# Patient Record
Sex: MALE | Race: WHITE | NOT HISPANIC OR LATINO | Employment: UNEMPLOYED | URBAN - METROPOLITAN AREA
[De-identification: names, ages, dates, MRNs, and addresses within clinical notes are randomized per-mention and may not be internally consistent; named-entity substitution may affect disease eponyms.]

---

## 2022-01-24 ENCOUNTER — OFFICE VISIT (OUTPATIENT)
Dept: NEUROSURGERY | Facility: CLINIC | Age: 57
End: 2022-01-24
Payer: OTHER MISCELLANEOUS

## 2022-01-24 VITALS — WEIGHT: 171.6 LBS | SYSTOLIC BLOOD PRESSURE: 132 MMHG | DIASTOLIC BLOOD PRESSURE: 84 MMHG

## 2022-01-24 DIAGNOSIS — M48.061 SPINAL STENOSIS, LUMBAR REGION, WITHOUT NEUROGENIC CLAUDICATION: Primary | ICD-10-CM

## 2022-01-24 PROCEDURE — 99204 OFFICE O/P NEW MOD 45 MIN: CPT | Performed by: PHYSICIAN ASSISTANT

## 2022-01-24 RX ORDER — ALBUTEROL SULFATE 0.83 MG/ML
2.5 SOLUTION RESPIRATORY (INHALATION)
Status: ON HOLD | COMMUNITY
Start: 2020-03-10 | End: 2022-02-25

## 2022-01-24 RX ORDER — TERBINAFINE HYDROCHLORIDE 250 MG/1
TABLET ORAL
COMMUNITY
Start: 2021-07-07 | End: 2022-04-08

## 2022-01-24 RX ORDER — SUMATRIPTAN 100 MG/1
TABLET, FILM COATED ORAL
Status: ON HOLD | COMMUNITY
Start: 2020-07-16 | End: 2022-02-25

## 2022-01-24 RX ORDER — ESCITALOPRAM OXALATE 20 MG/1
1 TABLET ORAL DAILY
COMMUNITY
Start: 2020-12-01

## 2022-01-24 RX ORDER — HYDROCODONE BITARTRATE AND ACETAMINOPHEN 5; 325 MG/1; MG/1
1 TABLET ORAL EVERY 6 HOURS PRN
Qty: 20 TABLET | Refills: 0 | Status: ON HOLD | OUTPATIENT
Start: 2022-01-24 | End: 2022-03-02

## 2022-01-24 RX ORDER — RIZATRIPTAN BENZOATE 10 MG/1
10 TABLET, ORALLY DISINTEGRATING ORAL
COMMUNITY
Start: 2021-08-05

## 2022-01-24 RX ORDER — DEXAMETHASONE 4 MG/1
TABLET ORAL
Status: ON HOLD | COMMUNITY
Start: 2020-03-10 | End: 2022-02-25

## 2022-01-24 RX ORDER — BUPRENORPHINE HYDROCHLORIDE AND NALOXONE HYDROCHLORIDE DIHYDRATE 8; 2 MG/1; MG/1
TABLET SUBLINGUAL
Status: ON HOLD | COMMUNITY
Start: 2020-08-25 | End: 2022-03-02

## 2022-01-24 RX ORDER — CICLOPIROX 80 MG/ML
SOLUTION TOPICAL
Status: ON HOLD | COMMUNITY
Start: 2021-04-09 | End: 2022-02-25

## 2022-01-24 RX ORDER — ALPRAZOLAM 1 MG
1 TABLET ORAL 3 TIMES DAILY PRN
COMMUNITY
Start: 2021-09-04

## 2022-01-24 ASSESSMENT — PAIN SCALES - GENERAL: PAINLEVEL: WORST PAIN (10)

## 2022-01-24 NOTE — LETTER
1/24/2022         RE: Waqas Lechuga  [address]  [facility] [id number]        Dear Colleague,    Thank you for referring your patient, Waqas Lechuga, to the [facility] [place]. Please see a copy of my visit note below.    Dr. Demetri Stallings  [facility]  Neurosurgery Clinic Visit      CC: back and leg pain    Primary care Provider: No Ref-Primary, Physician    Referring Provider:  Self/work comp      Reason For Visit:   I was asked to consult on the patient for back and leg pain.      HPI: Waqas Lechuga is a 56 year old male who presents for evaluation of his chief complaint of low back and bilateral leg pain.  Symptoms have been gradually worsening for the last several months, with no event or injury.  He originally had an L5-S1 fusion in 2005.  He never had issues up until a few months ago.  He describes severe, bandlike low back pain, with pain that radiates into the posterior thighs bilaterally.  He is unable to stand for any meaningful length of time due to this.  He has not had recent treatment, but has had extensive conservative treatment in the past.  No bowel or bladder changes, or any problems with balance or coordination.      Past Medical History reviewed with patient during visit.      Past Surgical History reviewed with patient during visit.    No current outpatient medications on file.     No current facility-administered medications for this visit.       Not on File    Social History     Socioeconomic History     Marital status:      Spouse name: Not on file     Number of children: Not on file     Years of education: Not on file     Highest education level: Not on file   Occupational History     Not on file   Tobacco Use     Smoking status: Not on file     Smokeless tobacco: Not on file   Substance and Sexual Activity     Alcohol use: Not on file     Drug use: Not on file     Sexual activity: Not on file   Other Topics Concern      Not on file   Social History Narrative     Not on file     Social Determinants of Health     Financial Resource Strain: Not on file   Food Insecurity: Not on file   Transportation Needs: Not on file   Physical Activity: Not on file   Stress: Not on file   Social Connections: Not on file   Intimate Partner Violence: Not on file   Housing Stability: Not on file       No family history on file.       ROS: 10 point ROS neg other than the symptoms noted above in the HPI.    Vital Signs: There were no vitals taken for this visit.    Examination:  Constitutional:  Alert, well nourished, NAD.  HEENT: Normocephalic, atraumatic.   Pulmonary:  Without shortness of breath, normal effort.   Lymph: no lymphadenopathy to low back or LE.   Integumentary: Skin is free of rashes or lesions.   Cardiovascular:  No pitting edema of BLE.    Psych: Normal affect, no apparent distress    Neurological:  Awake  Alert  Oriented x 3  Speech clear  Cranial nerves II - XII grossly intact  Motor exam   Hip Flexor:                Right: 5/5  Left:  5/5  Hip Adductor:             Right:  5/5  Left:  5/5  Hip Abductor:             Right:  5/5  Left:  5/5  Gastroc Soleus:        Right:  5/5  Left:  5/5  Tib/Ant:                      Right:  5/5  Left:  5/5  EHL:                          Right:  5/5  Left:  5/5       Sensation normal to bilateral upper and lower extremities.    Reflexes are 2+ in the patellar and Achilles. There is no clonus. Downgoing Babinski.      Musculoskeletal:  Gait: Able to stand from a seated position. Normal non-antalgic, non-myelopathic gait.    Lumbar examination reveals no tenderness of the spine or paraspinous muscles.  Hip height is symmetrical. Negative SI joint, sciatic notch or greater trochanteric tenderness to palpation bilaterally.  Straight leg raise is negative bilaterally.      Imaging:   MRI of the lumbar spine was reviewed in the office today.  It shows his prior fusion at L5-S1.  There is adjacent segment  disease at L4-5, which is severe with stenosis centrally.  He has associated facet arthropathy as well.  There is also some left lateral recess narrowing at L3-4, due to broad-based disc bulging.    Assessment/Plan:     Prior L5-S1 fusion in 2005  L4-5 adjacent segment dysfunction  Severe central stenosis L4-5  L4-5 facet arthropathy bilaterally      Waqas Lechuga is a 56 year old male.  I did have a discussion with the patient regarding his long history and symptoms.  He has history of an L5-S1 fusion in 2005, and has now developed adjacent segment disease at L4-5, which is quite severe with severe central stenosis and associated bilateral facet arthropathy.  He also has some narrowing at L3-4, more prominently on the left.  He has a lot of social issues going on right now as well.  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  Ultimately, we discussed that he is likely going to require adjacent segment surgery with Dr. Stallings.  Physical therapy is not likely to be of any benefit for him.  He is not interested in an injection at present.  He would like to address this definitively if possible.  He voiced agreement and understanding.  We will have him follow-up with Dr. Stallings.    He also mentions a history of having 3 pulmonary nodules or tumors, as well as a 60 pound weight loss, not intentional, in the last year.  He was strongly advised to follow-up with his primary care physician to discuss the next steps.  He may need to be referred to oncology or have some additional imaging.          Pasquale Hurst PA-C  [facility] Neurosurgery  [address] [address]    Tel [phone]  Pager [phone]      The use of Dragon/userfoxation services may have been used to construct the content in this note; any grammatical or spelling errors are non-intentional. Please contact the author  "of this note directly if you are in need of any clarification.      Waqas Lechuga is a 56 year old male who presents for:  Chief Complaint   Patient presents with     Neurologic Problem     Spinal cord stenosis        Initial Vitals:  /84 (BP Location: Right arm, Patient Position: Sitting, Cuff Size: Adult Regular)   Wt 171 lb 9.6 oz (77.8 kg)  There is no height or weight on file to calculate BMI.. There is no height or weight on file to calculate BSA. BP completed using cuff size: {BP CUFF SIZE:507::\"regular\"}  Worst Pain (10)    Nursing Comments:       Michelle Castro      Again, thank you for allowing me to participate in the care of your patient.        Sincerely,        Pasquale Hurst PA-C    "

## 2022-01-24 NOTE — PROGRESS NOTES
Dr. Demetri Stallings  [facility]  Neurosurgery Clinic Visit      CC: back and leg pain    Primary care Provider: No Ref-Primary, Physician    Referring Provider:  Self/work comp      Reason For Visit:   I was asked to consult on the patient for back and leg pain.      HPI: Waqas Lechuga is a 56 year old male who presents for evaluation of his chief complaint of low back and bilateral leg pain.  Symptoms have been gradually worsening for the last several months, with no event or injury.  He originally had an L5-S1 fusion in 2005.  He never had issues up until a few months ago.  He describes severe, bandlike low back pain, with pain that radiates into the posterior thighs bilaterally.  He is unable to stand for any meaningful length of time due to this.  He has not had recent treatment, but has had extensive conservative treatment in the past.  No bowel or bladder changes, or any problems with balance or coordination.      Past Medical History reviewed with patient during visit.      Past Surgical History reviewed with patient during visit.    No current outpatient medications on file.     No current facility-administered medications for this visit.       Not on File    Social History     Socioeconomic History     Marital status:      Spouse name: Not on file     Number of children: Not on file     Years of education: Not on file     Highest education level: Not on file   Occupational History     Not on file   Tobacco Use     Smoking status: Not on file     Smokeless tobacco: Not on file   Substance and Sexual Activity     Alcohol use: Not on file     Drug use: Not on file     Sexual activity: Not on file   Other Topics Concern     Not on file   Social History Narrative     Not on file     Social Determinants of Health     Financial Resource Strain: Not on file   Food Insecurity: Not on file   Transportation Needs: Not on file   Physical Activity: Not on file   Stress: Not on file   Social  Connections: Not on file   Intimate Partner Violence: Not on file   Housing Stability: Not on file       No family history on file.       ROS: 10 point ROS neg other than the symptoms noted above in the HPI.    Vital Signs: There were no vitals taken for this visit.    Examination:  Constitutional:  Alert, well nourished, NAD.  HEENT: Normocephalic, atraumatic.   Pulmonary:  Without shortness of breath, normal effort.   Lymph: no lymphadenopathy to low back or LE.   Integumentary: Skin is free of rashes or lesions.   Cardiovascular:  No pitting edema of BLE.    Psych: Normal affect, no apparent distress    Neurological:  Awake  Alert  Oriented x 3  Speech clear  Cranial nerves II - XII grossly intact  Motor exam   Hip Flexor:                Right: 5/5  Left:  5/5  Hip Adductor:             Right:  5/5  Left:  5/5  Hip Abductor:             Right:  5/5  Left:  5/5  Gastroc Soleus:        Right:  5/5  Left:  5/5  Tib/Ant:                      Right:  5/5  Left:  5/5  EHL:                          Right:  5/5  Left:  5/5       Sensation normal to bilateral upper and lower extremities.    Reflexes are 2+ in the patellar and Achilles. There is no clonus. Downgoing Babinski.      Musculoskeletal:  Gait: Able to stand from a seated position. Normal non-antalgic, non-myelopathic gait.    Lumbar examination reveals no tenderness of the spine or paraspinous muscles.  Hip height is symmetrical. Negative SI joint, sciatic notch or greater trochanteric tenderness to palpation bilaterally.  Straight leg raise is negative bilaterally.      Imaging:   MRI of the lumbar spine was reviewed in the office today.  It shows his prior fusion at L5-S1.  There is adjacent segment disease at L4-5, which is severe with stenosis centrally.  He has associated facet arthropathy as well.  There is also some left lateral recess narrowing at L3-4, due to broad-based disc bulging.    Assessment/Plan:     Prior L5-S1 fusion in 2005  L4-5 adjacent  segment dysfunction  Severe central stenosis L4-5  L4-5 facet arthropathy bilaterally      Waqas Lechuga is a 56 year old male.  I did have a discussion with the patient regarding his long history and symptoms.  He has history of an L5-S1 fusion in 2005, and has now developed adjacent segment disease at L4-5, which is quite severe with severe central stenosis and associated bilateral facet arthropathy.  He also has some narrowing at L3-4, more prominently on the left.  He has a lot of social issues going on right now as well.  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  Ultimately, we discussed that he is likely going to require adjacent segment surgery with Dr. Stallings.  Physical therapy is not likely to be of any benefit for him.  He is not interested in an injection at present.  He would like to address this definitively if possible.  He voiced agreement and understanding.  We will have him follow-up with Dr. Stallings.    He also mentions a history of having 3 pulmonary nodules or tumors, as well as a 60 pound weight loss, not intentional, in the last year.  He was strongly advised to follow-up with his primary care physician to discuss the next steps.  He may need to be referred to oncology or have some additional imaging.          Pasquale Hurst PA-C  [facility] [place] [address]    Tel [phone]  Pager [phone]      The use of Dragon/[employer] Materialsation services may have been used to construct the content in this note; any grammatical or spelling errors are non-intentional. Please contact the author of this note directly if you are in need of any clarification.

## 2022-01-24 NOTE — PROGRESS NOTES
Waqas Lechuga is a 56 year old male who presents for:  Chief Complaint   Patient presents with     Neurologic Problem     Spinal cord stenosis        Initial Vitals:  /84 (BP Location: Right arm, Patient Position: Sitting, Cuff Size: Adult Regular)   Wt 171 lb 9.6 oz (77.8 kg)  There is no height or weight on file to calculate BMI.. There is no height or weight on file to calculate BSA. BP completed using cuff size: regular  Worst Pain (10)    Nursing Comments:       Michelle Castro

## 2022-01-27 ENCOUNTER — OFFICE VISIT (OUTPATIENT)
Dept: NEUROSURGERY | Facility: CLINIC | Age: 57
End: 2022-01-27
Payer: OTHER MISCELLANEOUS

## 2022-01-27 VITALS
DIASTOLIC BLOOD PRESSURE: 82 MMHG | SYSTOLIC BLOOD PRESSURE: 128 MMHG | TEMPERATURE: 97.7 F | BODY MASS INDEX: 24.61 KG/M2 | HEIGHT: 70 IN | WEIGHT: 171.9 LBS

## 2022-01-27 DIAGNOSIS — M54.16 SPINAL STENOSIS OF LUMBAR REGION WITH RADICULOPATHY: Primary | ICD-10-CM

## 2022-01-27 DIAGNOSIS — M48.061 SPINAL STENOSIS OF LUMBAR REGION WITH RADICULOPATHY: Primary | ICD-10-CM

## 2022-01-27 DIAGNOSIS — M48.061 SPINAL STENOSIS OF LUMBAR REGION, UNSPECIFIED WHETHER NEUROGENIC CLAUDICATION PRESENT: Primary | ICD-10-CM

## 2022-01-27 PROCEDURE — 99214 OFFICE O/P EST MOD 30 MIN: CPT | Performed by: NEUROLOGICAL SURGERY

## 2022-01-27 ASSESSMENT — MIFFLIN-ST. JEOR: SCORE: 1610.98

## 2022-01-27 ASSESSMENT — PAIN SCALES - GENERAL: PAINLEVEL: EXTREME PAIN (8)

## 2022-01-27 NOTE — PROGRESS NOTES
Reviewed pre- and post-operative instructions as outlined in the After Visit Summary/Patient Instructions with patient.   Surgery folder was given to patient    Patient Education Topic: Procedure with Dr. Stallings     Learner(s): Patient    Knowledge Level: Basic    Readiness to Learn: Ready    Method:  Verbal explanation and Written material     Outcome: Able to verbalize instructions    Barriers to Learning: No barrier    Covid Testing: patient educated they will need to have a test for the novel Coronavirus, COVID-19.The test needs to be completed within 4 days (96) hours of surgery. Order Placed.    Scheduling Number: [phone]    NDI/NAT:  Confirmation of completion within last 6 months      Patient had the opportunity for questions to be answered. Advised Patient to call clinic with any questions/concerns. Gave patient antibacterial soap for pre-surgery skin preparation.     Anabell Wang RN on 1/27/2022 at 3:34 PM

## 2022-01-27 NOTE — PROGRESS NOTES
"Waqas Lechuga is a 56 year old male who presents for:  Chief Complaint   Patient presents with     Neurologic Problem     spinal stenosis        Initial Vitals:  Ht 5' 10\" (1.778 m)   Wt 171 lb 14.4 oz (78 kg)   BMI 24.67 kg/m   Estimated body mass index is 24.67 kg/m  as calculated from the following:    Height as of this encounter: 5' 10\" (1.778 m).    Weight as of this encounter: 171 lb 14.4 oz (78 kg).. Body surface area is 1.96 meters squared. BP completed using cuff size: regular  Data Unavailable    Nursing Comments:     Mi Reyes    "

## 2022-01-27 NOTE — PATIENT INSTRUCTIONS
Patient Instructions    Surgery scheduled at [facility] for  L4-S1 extension PSF with L5-S1 hardware removal, Left L1-2 and L3-4 microdiscectomies  with Dr. Stallings     Pre-Operative    Ordered a CT scan to be completed pre-op. [phone]    Surgical risks: blood clots in the leg or lung, problems urinating, nerve damage, drainage from the incision, infection, stiffness    Pre-operative physical with primary care physician within 30 days of surgical date.     2-4 night hospitalization stay     Smoking Cessation: You are advised to quit smoking immediately through recovery to help with healing and reduce risk of complications. Printout given.     Shower procedure  o Please shower with antimicrobial soap the night before surgery and morning of surgery. Please refer to showering instruction sheet in folder.    Eating/Drinking  o Stop all solid foods 8 hours before surgery.  o Keep drinking clear liquids until 4 hours before surgery  - Clear liquids include water, clear juice, black coffee, or clear tea without milk, Gatorade, clear soda.     Medications  o Hold Aspirin, NSAIDs (Advil/Ibuprofen, Indocin, Naproxen,Nuprin,Relafen/Nabumetone, Diclofenac,Meloxicam, Aleve, Celebrex) x 7 days prior to surgical date  o You can take Tylenol (Acetaminophen) for pain, 1000 mg  - Do not exceed 3,000 mg per day   o  If you are on chronic pain medication (oxycodone, Percocet, hydrocodone, Vicodin, Norco, Dilaudid, morphine, MS Contin, naltrexone, Suboxone, etc) or have a pain contract you need  to contact the pain medication prescriber and/or the prescriber who holds the pain contract with you. A plan needs to be created between you and the provider on how to take your chronic pain medication leading up to surgery and what you will be taking to control pain/who will be prescribing that pain medication during recovery.  o Any other medications prescribed, please discuss with your primary care provider at  your pre-operative physical     COVID Testing  As part of preparation for your upcoming procedure you are required to have a test for the novel Coronavirus, COVID-19.  o Please call the drive-up testing number to schedule your appointment. The test needs to be completed within 4 days (96) hours of surgery.   o Scheduling Number: [phone]    You may NOT receive the COVID-19 vaccine 72 hours before or after surgery.    [email or web address]    Post-Operative    Pain Management    Dealing with pain  o As your body heals, you might feel a stabbing, burning, or aching pain. You may also have some numbness.  o Everyone feels pain differently, we may ask you to rate your pain using a pain scale. This will let us know how much pain you feel.   o Keep in mind that medicine won't take away all of your pain. It helps to try other ways to relax and ease pain.     Things to help with pain  o After surgery, we will give you medicine for your pain. These medications work well, but they can make you drowsy, itchy, or sick to your stomach. If we give you narcotics for pain, try to take the pills with food.   o For mild to moderate pain, you can take medication such as Tylenol. These can be used with narcotics, but make sure that your narcotic does not contain Tylenol.   - Do NOT drive while taking narcotic pain medication  - Do NOT drink alcohol while using any pain medication  o You can utilize ice as needed (no longer than 20 minutes at one time)    Refills: please call the neurosurgery clinic to request a few days before you run out    You may resume taking NSAIDs (ex. Ibuprofen, aleve, naproxen) 6 weeks after surgery as it may cause bleeding and interfere with bone healing     Incision Care    Look at your incision site every day. You  may need a mirror or family member to help you.     Watch for signs of infection  o Redness, swelling, warmth, drainage (Green or yellow drainage (pus) from your incision or increased bloody  drainage), and fever of 101 degrees or higher  o Notify clinic [phone]    Remove dressing as instructed upon discharge    Do not apply lotions or ointments to incision    You may get your incision wet in the shower. Allow water and soap to run over incision, and gently pat dry.     No submerging incision in water such as pools, hot tubs, baths for at least 8 weeks or until incision is healed    Activity Restrictions    For the first 6-8 weeks, no lifting > 10 pounds, no bending, twisting, or overhead reaching.    Take stairs in moderation     Walking: Walking is the best way to start exercise after surgery. Take short frequent walks. You may gradually increase the distance as tolerated. If you feel pain, decrease your activity, but DO NOT stop walking. Walking will help you gain strength and prevent muscle soreness and spasms.     Avoid bed rest and prolonged sitting for longer than 30 minutes (change positions frequently while awake)    No contact sports until after follow up visit    No high impact activities such as; running/jogging, snowmobile or 4 lyles riding or any other recreational vehicles    If a brace is required per Dr. Stallings, Orth[facility] will fit you for the brace in the hospital. Brace type and length of time to wear it will be determined by Dr. Stallings.   Contact clinic right away if you develop:     Infection    New injury    Bladder or bowel changes or loss of control      Signs of blood clot:  o Swelling and/or warmth in one or both legs  o Pain or tenderness in your leg, ankle, foot, or arm   o Red or discolored skin     Go to the Emergency Room     If sudden onset of severe headache, weakness, confusion, change in level of consciousness, pain, or loss of movement.    Chest pain     Trouble breathing     Post-Op Follow Up Appointments    2 week incision check & staple/suture removal with RN    6 week post op with x-ray prior     3 months post op with x-ray prior     6 months post op with x-ray  "prior     1 year post op with x-ray prior    Please call to schedule follow up and xray appointments at [phone]    Resources    If you are currently employed, you will need to be off work for 4-6 weeks for post op recovery and healing.    Please fax any FMLA/short term disability paperwork to [phone] prior to surgery    You may call our clinic when you'd like to return to work and we can provide a work letter    To allow staff adequate time to complete paperwork, please send as soon as possible     Anabell JONES RN  [facility]  [facility] - [address] [address]  Telephone:  [phone]       Fax:  [phone]      Ways to Quit Smoking     It's not too late to quit smoking.   Quitting smoking helps your circulation, your stamina, your skin, and your general health. Your risk for coronary heart disease, a common cause of death and disability, is halved after only a year without smoking. Quitting smoking also reduces the likelihood of your getting respiratory problems and lung cancer.   Studies have shown that your smoke affects others as well as yourself. Children of parents who smoke around the house are more prone to respiratory infections than children from nonsmoking homes.   Smoking is an addictive habit. Most former smokers make several attempts to quit before they are finally successful. So, never say, \"I can't.\" Just keep trying.   Set a quit date.   Set a date for when you will stop smoking. Don't buy cigarettes to carry you beyond your last day. Tell your family and friends you plan to quit, and ask for their support and encouragement. Ask them not to offer you cigarettes.   Throw your cigarettes away.   If you keep cigarettes around, sooner or later you'll break down and smoke one, then another, then another, and so on. Throw them away. Make it less easy to start again.   Try chewing gum is as a " "substitute for cigarettes.   Spend time with nonsmokers rather than with smokers.   Think of yourself and identify yourself as a nonsmoker (for example, in restaurants). Stay away from \"smokers' levy,\" such as bars. Avoid spending time with smokers. You can't tell others not to smoke, but you don't have to sit with them while they do. Old habits die hard and one of your old smoking buddies is sure to offer you a cigarette. Plan on walking away from cigarette smoke. Spend time with nonsmokers and sit in the nonsmoking section of restaurants.   Be prepared for relapse or difficult situations.   Most relapses occur within the first 3 months after quitting. Many people try 5 or more times before they successfully quit. Avoid drinking alcohol, because it lowers your chances of success. Don't be distracted by weight gain, which is usually less than 10 pounds. Learn new ways to improve your mood and overcome depression.   Start an exercise program.   As you become more fit, you will not want the nicotine effects in your body. Regular exercise will also help keep you from gaining weight when you quit smoking.   Keep your hands busy.   You may not know what to do with your hands for a while.  a book or a magazine. Try knitting, needlework, pottery, drawing, making a plastic model, or doing a jigsaw puzzle. Join special interest groups that keep you involved in your hobby.   Take on new activities.   Take on new activities that don't include smoking. Join an exercise group and work out regularly. Sign up for an evening class or a join a study group at your place of [employer]. Go on more outings with your family or friends. Learn ways to relax and manage stress.   Join quit-smoking programs if it helps.   Some people do better in groups, or with a set of instructions to follow. That's fine, too. Remember, the aim is to quit smoking. It doesn't matter how you do it.   Consider using nicotine replacement therapy. "   Nicotine is the drug that is in tobacco. You can use nicotine patches or gum, available without a prescription at your local pharmacy, to quit smoking. It is a two-step process. First you learn to live without smoking, but not without nicotine. Then, as you graduate to patches with less nicotine, or chew less of the nicotine gum, you wean yourself off the nicotine.   Your health care provider can prescribe nicotine substitutes that can almost double your chances of quitting for good. They are:   Zyban (bupropion HCL)   nicotine inhaler   nicotine lozenge   nicotine nasal spray   nicotine patch.   You may prefer to be involved in an organized quit-smoking program while you are using nicotine patches or gum or other medicine to help you quit. None of these treatments is a miracle cure. You still need to learn to live without cigarettes in your daily life.     Developed by Toshia Freeman MD, for [employer].   Published by [employer].   This content is reviewed periodically and is subject to change as new health information becomes available. The information is intended to inform and educate and is not a replacement for medical evaluation, advice, diagnosis or treatment by a healthcare professional.   Adult Health Advisor 2003.2 Index  Adult Health Advisor 2003.2 Credits   Copyright   2003 [employer]. All rights reserved.     More information can also be obtained from the [employer] [place]:  Telephone: 0-604-0-CANCER (1-263.386.8562)   TTY: 1-861.269.5191   (for deaf or hard of hearing callers)

## 2022-01-27 NOTE — LETTER
"    1/27/2022         RE: Waqas Lechuga  [address]  [facility] [place] [address]        Dear Colleague,    Thank you for referring your patient, Waqas Lechuga, to the [facility] [place]. Please see a copy of my visit note below.    Waqas Lechuga is a 56 year old male who presents for:  Chief Complaint   Patient presents with     Neurologic Problem     spinal stenosis        Initial Vitals:  Ht 5' 10\" (1.778 m)   Wt 171 lb 14.4 oz (78 kg)   BMI 24.67 kg/m   Estimated body mass index is 24.67 kg/m  as calculated from the following:    Height as of this encounter: 5' 10\" (1.778 m).    Weight as of this encounter: 171 lb 14.4 oz (78 kg).. Body surface area is 1.96 meters squared. BP completed using cuff size: regular  Data Unavailable    Nursing Comments:     Mi Reyes      Waqas Lechuga is a 56 year old male who presents for evaluation of his chief complaint of low back and bilateral leg pain.  Symptoms have been gradually worsening for the last several months, with no event or injury.  He originally had an L5-S1 fusion in 2005.  He never had issues up until a few months ago.  He describes severe, bandlike low back pain, with pain that radiates into the posterior thighs bilaterally.  He is unable to stand for any meaningful length of time due to this.  He has not had recent treatment, but has had extensive conservative treatment in the past.  No bowel or bladder changes, or any problems with balance or coordination.    Returns for follow up.  Continued severe back and leg pain, worse with standing and walking, and sharp left leg pain radiating to the anterior thigh and shin.  Multiple JESUS and home therapy without improvement.    Past Medical History reviewed with patient during visit.      Past Surgical History reviewed with patient during visit.    Current Outpatient Medications   Medication     albuterol (PROVENTIL) (2.5 MG/3ML) 0.083% neb solution     ALPRAZolam (XANAX) " 1 MG tablet     buprenorphine-naloxone (SUBOXONE) 8-2 MG SUBL sublingual tablet     ciclopirox (PENLAC) 8 % external solution     dexamethasone (DECADRON) 4 MG tablet     escitalopram (LEXAPRO) 20 MG tablet     HYDROcodone-acetaminophen (NORCO) 5-325 MG tablet     rizatriptan (MAXALT-MLT) 10 MG ODT     SUMAtriptan (IMITREX) 100 MG tablet     terbinafine (LAMISIL) 250 MG tablet     tiZANidine (ZANAFLEX) 4 MG tablet     No current facility-administered medications for this visit.       Allergies   Allergen Reactions     Prochlorperazine Other (See Comments)     [facility] Comment: Dystonic rxn; [facility] Reaction: Other  Other reaction(s): *Unknown     Meperidine Rash     Trazodone Other (See Comments) and Unknown     Erection that lasted a long time  [facility] Comment: Patient states he gets an erection. ; [facility] Reaction: Unknown; [facility] Reaction: 17; [facility] Severity: Low; [facility] Noted: 20150121  Erection that lasted a long time  Patient states he gets an erection.          Social History     Socioeconomic History     Marital status:      Spouse name: Not on file     Number of children: Not on file     Years of education: Not on file     Highest education level: Not on file   Occupational History     Not on file   Tobacco Use     Smoking status: Not on file     Smokeless tobacco: Not on file   Substance and Sexual Activity     Alcohol use: Not on file     Drug use: Not on file     Sexual activity: Not on file   Other Topics Concern     Not on file   Social History Narrative     Not on file     Social Determinants of Health     Financial Resource Strain: Not on file   Food Insecurity: Not on file   Transportation Needs: Not on file   Physical Activity: Not on file   Stress: Not on file   Social Connections: Not on file   Intimate Partner Violence: Not on file   Housing Stability: Not on file       No family history on file.       ROS: 10 point ROS neg other than the symptoms noted above in the HPI.    Vital Signs: /82 (BP Location:  "Right arm, Patient Position: Sitting, Cuff Size: Adult Regular)   Temp 97.7  F (36.5  C) (Temporal)   Ht 1.778 m (5' 10\")   Wt 78 kg (171 lb 14.4 oz)   BMI 24.67 kg/m      Examination:  Constitutional:  Alert, well nourished, NAD.  HEENT: Normocephalic, atraumatic.   Pulmonary:  Without shortness of breath, normal effort.   Lymph: no lymphadenopathy to low back or LE.   Integumentary: Skin is free of rashes or lesions.   Cardiovascular:  No pitting edema of BLE.    Psych: Normal affect, no apparent distress    Neurological:  Awake  Alert  Oriented x 3  Speech clear  Cranial nerves II - XII grossly intact  Motor exam   Hip Flexor:                Right: 5/5  Left:  5/5  Hip Adductor:             Right:  5/5  Left:  5/5  Hip Abductor:             Right:  5/5  Left:  5/5  Gastroc Soleus:        Right:  5/5  Left:  5/5  Tib/Ant:                      Right:  5/5  Left:  5/5  EHL:                          Right:  5/5  Left:  5/5       Sensation normal to bilateral upper and lower extremities.    Reflexes are 2+ in the patellar and Achilles. There is no clonus. Downgoing Babinski.      Musculoskeletal:  Gait: Able to stand from a seated position. Normal non-antalgic, non-myelopathic gait.    Lumbar examination reveals no tenderness of the spine or paraspinous muscles.  Hip height is symmetrical. Negative SI joint, sciatic notch or greater trochanteric tenderness to palpation bilaterally.  Straight leg raise is negative bilaterally.      Imaging:   MRI of the lumbar spine was reviewed in the office today.  It shows his prior fusion at L5-S1.  There is adjacent segment disease at L4-5, which is severe with stenosis centrally.  He has associated facet arthropathy as well.  There is also some left lateral recess narrowing at L3-4, due to broad-based disc bulging.    Assessment/Plan:     Prior L5-S1 fusion in 2005  L4-5 adjacent segment dysfunction  Severe central stenosis L4-5  Lumbar disc herniations and " radiculopathy      Waqas Lechuga is a 56 year old male.  I did have a discussion with the patient regarding his long history and symptoms.  He has history of an L5-S1 fusion in 2005, and has now developed adjacent segment disease at L4-5, which is quite severe with severe central stenosis and associated bilateral facet arthropathy.  Also has left L1-2 and L3-4 left lateral recess extruded disc herniations      Again, thank you for allowing me to participate in the care of your patient.        Sincerely,        Demetri Stallings MD

## 2022-01-27 NOTE — PROGRESS NOTES
Waqas Lechuga is a 56 year old male who presents for evaluation of his chief complaint of low back and bilateral leg pain.  Symptoms have been gradually worsening for the last several months, with no event or injury.  He originally had an L5-S1 fusion in 2005.  He never had issues up until a few months ago.  He describes severe, bandlike low back pain, with pain that radiates into the posterior thighs bilaterally.  He is unable to stand for any meaningful length of time due to this.  He has not had recent treatment, but has had extensive conservative treatment in the past.  No bowel or bladder changes, or any problems with balance or coordination.    Returns for follow up.  Continued severe back and leg pain, worse with standing and walking, and sharp left leg pain radiating to the anterior thigh and shin.  Multiple JESUS and home therapy without improvement.    Past Medical History reviewed with patient during visit.      Past Surgical History reviewed with patient during visit.    Current Outpatient Medications   Medication     albuterol (PROVENTIL) (2.5 MG/3ML) 0.083% neb solution     ALPRAZolam (XANAX) 1 MG tablet     buprenorphine-naloxone (SUBOXONE) 8-2 MG SUBL sublingual tablet     ciclopirox (PENLAC) 8 % external solution     dexamethasone (DECADRON) 4 MG tablet     escitalopram (LEXAPRO) 20 MG tablet     HYDROcodone-acetaminophen (NORCO) 5-325 MG tablet     rizatriptan (MAXALT-MLT) 10 MG ODT     SUMAtriptan (IMITREX) 100 MG tablet     terbinafine (LAMISIL) 250 MG tablet     tiZANidine (ZANAFLEX) 4 MG tablet     No current facility-administered medications for this visit.       Allergies   Allergen Reactions     Prochlorperazine Other (See Comments)     [facility] Comment: Dystonic rxn; [facility] Reaction: Other  Other reaction(s): *Unknown     Meperidine Rash     Trazodone Other (See Comments) and Unknown     Erection that lasted a long time  [facility] Comment: Patient states he gets an erection. ; [facility] Reaction: Unknown; [facility]  "Reaction: 17; [facility] Severity: Low; [facility] Noted: 20150121  Erection that lasted a long time  Patient states he gets an erection.          Social History     Socioeconomic History     Marital status:      Spouse name: Not on file     Number of children: Not on file     Years of education: Not on file     Highest education level: Not on file   Occupational History     Not on file   Tobacco Use     Smoking status: Not on file     Smokeless tobacco: Not on file   Substance and Sexual Activity     Alcohol use: Not on file     Drug use: Not on file     Sexual activity: Not on file   Other Topics Concern     Not on file   Social History Narrative     Not on file     Social Determinants of Health     Financial Resource Strain: Not on file   Food Insecurity: Not on file   Transportation Needs: Not on file   Physical Activity: Not on file   Stress: Not on file   Social Connections: Not on file   Intimate Partner Violence: Not on file   Housing Stability: Not on file       No family history on file.       ROS: 10 point ROS neg other than the symptoms noted above in the HPI.    Vital Signs: /82 (BP Location: Right arm, Patient Position: Sitting, Cuff Size: Adult Regular)   Temp 97.7  F (36.5  C) (Temporal)   Ht 1.778 m (5' 10\")   Wt 78 kg (171 lb 14.4 oz)   BMI 24.67 kg/m      Examination:  Constitutional:  Alert, well nourished, NAD.  HEENT: Normocephalic, atraumatic.   Pulmonary:  Without shortness of breath, normal effort.   Lymph: no lymphadenopathy to low back or LE.   Integumentary: Skin is free of rashes or lesions.   Cardiovascular:  No pitting edema of BLE.    Psych: Normal affect, no apparent distress    Neurological:  Awake  Alert  Oriented x 3  Speech clear  Cranial nerves II - XII grossly intact  Motor exam   Hip Flexor:                Right: 5/5  Left:  5/5  Hip Adductor:             Right:  5/5  Left:  5/5  Hip Abductor:             Right:  5/5  Left:  5/5  Gastroc Soleus:        Right:  5/5  Left:  " 5/5  Tib/Ant:                      Right:  5/5  Left:  5/5  EHL:                          Right:  5/5  Left:  5/5       Sensation normal to bilateral upper and lower extremities.    Reflexes are 2+ in the patellar and Achilles. There is no clonus. Downgoing Babinski.      Musculoskeletal:  Gait: Able to stand from a seated position. Normal non-antalgic, non-myelopathic gait.    Lumbar examination reveals no tenderness of the spine or paraspinous muscles.  Hip height is symmetrical. Negative SI joint, sciatic notch or greater trochanteric tenderness to palpation bilaterally.  Straight leg raise is negative bilaterally.      Imaging:   MRI of the lumbar spine was reviewed in the office today.  It shows his prior fusion at L5-S1.  There is adjacent segment disease at L4-5, which is severe with stenosis centrally.  He has associated facet arthropathy as well.  There is also some left lateral recess narrowing at L3-4, due to broad-based disc bulging.    Assessment/Plan:     Prior L5-S1 fusion in 2005  L4-5 adjacent segment dysfunction  Severe central stenosis L4-5  Lumbar disc herniations and radiculopathy      Waqas Lechuga is a 56 year old male.  I did have a discussion with the patient regarding his long history and symptoms.  He has history of an L5-S1 fusion in 2005, and has now developed adjacent segment disease at L4-5, which is quite severe with severe central stenosis and associated bilateral facet arthropathy.  Also has left L1-2 and L3-4 left lateral recess extruded disc herniations

## 2022-02-07 DIAGNOSIS — Z11.59 ENCOUNTER FOR SCREENING FOR OTHER VIRAL DISEASES: Primary | ICD-10-CM

## 2022-02-12 ENCOUNTER — HEALTH MAINTENANCE LETTER (OUTPATIENT)
Age: 57
End: 2022-02-12

## 2022-02-18 ENCOUNTER — TELEPHONE (OUTPATIENT)
Dept: NEUROLOGY | Facility: CLINIC | Age: 57
End: 2022-02-18

## 2022-02-18 NOTE — TELEPHONE ENCOUNTER
The patient called needing a Covid test.   He is hoping at having the Covid test on Monday before his CT. Order is in the system .Given the number to Central Scheduling.

## 2022-02-21 ENCOUNTER — HOSPITAL ENCOUNTER (OUTPATIENT)
Dept: CT IMAGING | Facility: CLINIC | Age: 57
Discharge: HOME | End: 2022-02-21
Attending: NEUROLOGICAL SURGERY | Admitting: NEUROLOGICAL SURGERY
Payer: OTHER MISCELLANEOUS

## 2022-02-21 ENCOUNTER — LAB (OUTPATIENT)
Dept: LAB | Facility: CLINIC | Age: 57
End: 2022-02-21
Payer: OTHER MISCELLANEOUS

## 2022-02-21 DIAGNOSIS — Z11.59 ENCOUNTER FOR SCREENING FOR OTHER VIRAL DISEASES: ICD-10-CM

## 2022-02-21 DIAGNOSIS — M48.061 SPINAL STENOSIS OF LUMBAR REGION WITH RADICULOPATHY: ICD-10-CM

## 2022-02-21 DIAGNOSIS — M54.16 SPINAL STENOSIS OF LUMBAR REGION WITH RADICULOPATHY: ICD-10-CM

## 2022-02-21 PROCEDURE — U0003 INFECTIOUS AGENT DETECTION BY NUCLEIC ACID (DNA OR RNA); SEVERE ACUTE RESPIRATORY SYNDROME CORONAVIRUS 2 (SARS-COV-2) (CORONAVIRUS DISEASE [COVID-19]), AMPLIFIED PROBE TECHNIQUE, MAKING USE OF HIGH THROUGHPUT TECHNOLOGIES AS DESCRIBED BY CMS-2020-01-R: HCPCS

## 2022-02-21 PROCEDURE — U0005 INFEC AGEN DETEC AMPLI PROBE: HCPCS

## 2022-02-21 PROCEDURE — 72131 CT LUMBAR SPINE W/O DYE: CPT

## 2022-02-22 DIAGNOSIS — M48.061 SPINAL STENOSIS OF LUMBAR REGION WITH RADICULOPATHY: Primary | ICD-10-CM

## 2022-02-22 DIAGNOSIS — M54.16 SPINAL STENOSIS OF LUMBAR REGION WITH RADICULOPATHY: Primary | ICD-10-CM

## 2022-02-22 LAB — SARS-COV-2 RNA RESP QL NAA+PROBE: NEGATIVE

## 2022-02-25 ENCOUNTER — HOSPITAL ENCOUNTER (INPATIENT)
Facility: CLINIC | Age: 57
LOS: 5 days | Discharge: HOME | DRG: 454 | End: 2022-03-02
Attending: NEUROLOGICAL SURGERY | Admitting: NEUROLOGICAL SURGERY
Payer: OTHER MISCELLANEOUS

## 2022-02-25 ENCOUNTER — TELEPHONE (OUTPATIENT)
Dept: NEUROSURGERY | Facility: CLINIC | Age: 57
End: 2022-02-25

## 2022-02-25 ENCOUNTER — APPOINTMENT (OUTPATIENT)
Dept: GENERAL RADIOLOGY | Facility: CLINIC | Age: 57
DRG: 454 | End: 2022-02-25
Attending: NEUROLOGICAL SURGERY
Payer: OTHER MISCELLANEOUS

## 2022-02-25 ENCOUNTER — ANESTHESIA EVENT (OUTPATIENT)
Dept: SURGERY | Facility: CLINIC | Age: 57
DRG: 454 | End: 2022-02-25
Payer: OTHER MISCELLANEOUS

## 2022-02-25 ENCOUNTER — DOCUMENTATION ONLY (OUTPATIENT)
Dept: NEUROSURGERY | Facility: CLINIC | Age: 57
End: 2022-02-25

## 2022-02-25 ENCOUNTER — ANESTHESIA (OUTPATIENT)
Dept: SURGERY | Facility: CLINIC | Age: 57
DRG: 454 | End: 2022-02-25
Payer: OTHER MISCELLANEOUS

## 2022-02-25 DIAGNOSIS — Z98.1 S/P LUMBAR SPINAL FUSION: Primary | ICD-10-CM

## 2022-02-25 LAB
ABO/RH(D): NORMAL
ANTIBODY SCREEN: NEGATIVE
SPECIMEN EXPIRATION DATE: NORMAL

## 2022-02-25 PROCEDURE — 61783 SCAN PROC SPINAL: CPT | Mod: AS | Performed by: NURSE PRACTITIONER

## 2022-02-25 PROCEDURE — 258N000003 HC RX IP 258 OP 636: Performed by: ANESTHESIOLOGY

## 2022-02-25 PROCEDURE — 250N000011 HC RX IP 250 OP 636: Performed by: NURSE ANESTHETIST, CERTIFIED REGISTERED

## 2022-02-25 PROCEDURE — 63030 LAMOT DCMPRN NRV RT 1 LMBR: CPT | Mod: AS | Performed by: NURSE PRACTITIONER

## 2022-02-25 PROCEDURE — 01NB0ZZ RELEASE LUMBAR NERVE, OPEN APPROACH: ICD-10-PCS | Performed by: NEUROLOGICAL SURGERY

## 2022-02-25 PROCEDURE — 20936 SP BONE AGRFT LOCAL ADD-ON: CPT | Performed by: NEUROLOGICAL SURGERY

## 2022-02-25 PROCEDURE — 120N000001 HC R&B MED SURG/OB

## 2022-02-25 PROCEDURE — 63030 LAMOT DCMPRN NRV RT 1 LMBR: CPT | Mod: 59 | Performed by: NEUROLOGICAL SURGERY

## 2022-02-25 PROCEDURE — 0ST20ZZ RESECTION OF LUMBAR VERTEBRAL DISC, OPEN APPROACH: ICD-10-PCS | Performed by: NEUROLOGICAL SURGERY

## 2022-02-25 PROCEDURE — 250N000009 HC RX 250: Performed by: ANESTHESIOLOGY

## 2022-02-25 PROCEDURE — 86850 RBC ANTIBODY SCREEN: CPT | Performed by: ANESTHESIOLOGY

## 2022-02-25 PROCEDURE — 0SG007J FUSION OF LUMBAR VERTEBRAL JOINT WITH AUTOLOGOUS TISSUE SUBSTITUTE, POSTERIOR APPROACH, ANTERIOR COLUMN, OPEN APPROACH: ICD-10-PCS | Performed by: NEUROLOGICAL SURGERY

## 2022-02-25 PROCEDURE — 0SG0071 FUSION OF LUMBAR VERTEBRAL JOINT WITH AUTOLOGOUS TISSUE SUBSTITUTE, POSTERIOR APPROACH, POSTERIOR COLUMN, OPEN APPROACH: ICD-10-PCS | Performed by: NEUROLOGICAL SURGERY

## 2022-02-25 PROCEDURE — 250N000011 HC RX IP 250 OP 636: Performed by: ANESTHESIOLOGY

## 2022-02-25 PROCEDURE — 63035 LAMOT DCMPRN NRV RT EA ADDL: CPT | Mod: AS | Performed by: NURSE PRACTITIONER

## 2022-02-25 PROCEDURE — 63052 LAM FACETC/FRMT ARTHRD LUM 1: CPT | Mod: AS | Performed by: NURSE PRACTITIONER

## 2022-02-25 PROCEDURE — 22853 INSJ BIOMECHANICAL DEVICE: CPT | Performed by: NEUROLOGICAL SURGERY

## 2022-02-25 PROCEDURE — 250N000009 HC RX 250: Performed by: NURSE ANESTHETIST, CERTIFIED REGISTERED

## 2022-02-25 PROCEDURE — 86901 BLOOD TYPING SEROLOGIC RH(D): CPT | Performed by: ANESTHESIOLOGY

## 2022-02-25 PROCEDURE — 22633 ARTHRD CMBN 1NTRSPC LUMBAR: CPT | Mod: AS | Performed by: NURSE PRACTITIONER

## 2022-02-25 PROCEDURE — P9041 ALBUMIN (HUMAN),5%, 50ML: HCPCS | Performed by: NURSE ANESTHETIST, CERTIFIED REGISTERED

## 2022-02-25 PROCEDURE — 710N000009 HC RECOVERY PHASE 1, LEVEL 1, PER MIN: Performed by: NEUROLOGICAL SURGERY

## 2022-02-25 PROCEDURE — 370N000017 HC ANESTHESIA TECHNICAL FEE, PER MIN: Performed by: NEUROLOGICAL SURGERY

## 2022-02-25 PROCEDURE — 22853 INSJ BIOMECHANICAL DEVICE: CPT | Mod: AS | Performed by: NURSE PRACTITIONER

## 2022-02-25 PROCEDURE — 999N000179 XR SURGERY CARM FLUORO LESS THAN 5 MIN W STILLS

## 2022-02-25 PROCEDURE — 250N000009 HC RX 250: Performed by: NEUROLOGICAL SURGERY

## 2022-02-25 PROCEDURE — 258N000003 HC RX IP 258 OP 636: Performed by: NURSE PRACTITIONER

## 2022-02-25 PROCEDURE — 250N000011 HC RX IP 250 OP 636: Performed by: NURSE PRACTITIONER

## 2022-02-25 PROCEDURE — 272N000002 HC OR SUPPLY OTHER OPNP: Performed by: NEUROLOGICAL SURGERY

## 2022-02-25 PROCEDURE — 22840 INSERT SPINE FIXATION DEVICE: CPT | Mod: AS | Performed by: NURSE PRACTITIONER

## 2022-02-25 PROCEDURE — 8E0WXBG COMPUTER ASSISTED PROCEDURE OF TRUNK REGION, WITH COMPUTERIZED TOMOGRAPHY: ICD-10-PCS | Performed by: NEUROLOGICAL SURGERY

## 2022-02-25 PROCEDURE — 63052 LAM FACETC/FRMT ARTHRD LUM 1: CPT | Performed by: NEUROLOGICAL SURGERY

## 2022-02-25 PROCEDURE — 22840 INSERT SPINE FIXATION DEVICE: CPT | Performed by: NEUROLOGICAL SURGERY

## 2022-02-25 PROCEDURE — 36415 COLL VENOUS BLD VENIPUNCTURE: CPT | Performed by: ANESTHESIOLOGY

## 2022-02-25 PROCEDURE — 61783 SCAN PROC SPINAL: CPT | Mod: 59 | Performed by: NEUROLOGICAL SURGERY

## 2022-02-25 PROCEDURE — 278N000051 HC OR IMPLANT GENERAL: Performed by: NEUROLOGICAL SURGERY

## 2022-02-25 PROCEDURE — C1762 CONN TISS, HUMAN(INC FASCIA): HCPCS | Performed by: NEUROLOGICAL SURGERY

## 2022-02-25 PROCEDURE — 22633 ARTHRD CMBN 1NTRSPC LUMBAR: CPT | Performed by: NEUROLOGICAL SURGERY

## 2022-02-25 PROCEDURE — 8E0WXBF COMPUTER ASSISTED PROCEDURE OF TRUNK REGION, WITH FLUOROSCOPY: ICD-10-PCS | Performed by: NEUROLOGICAL SURGERY

## 2022-02-25 PROCEDURE — 20930 SP BONE ALGRFT MORSEL ADD-ON: CPT | Performed by: NEUROLOGICAL SURGERY

## 2022-02-25 PROCEDURE — 8E0W0CZ ROBOTIC ASSISTED PROCEDURE OF TRUNK REGION, OPEN APPROACH: ICD-10-PCS | Performed by: NEUROLOGICAL SURGERY

## 2022-02-25 PROCEDURE — 272N000001 HC OR GENERAL SUPPLY STERILE: Performed by: NEUROLOGICAL SURGERY

## 2022-02-25 PROCEDURE — C1713 ANCHOR/SCREW BN/BN,TIS/BN: HCPCS | Performed by: NEUROLOGICAL SURGERY

## 2022-02-25 PROCEDURE — 250N000011 HC RX IP 250 OP 636: Performed by: NEUROLOGICAL SURGERY

## 2022-02-25 PROCEDURE — 63035 LAMOT DCMPRN NRV RT EA ADDL: CPT | Mod: LT | Performed by: NEUROLOGICAL SURGERY

## 2022-02-25 PROCEDURE — 0SB20ZZ EXCISION OF LUMBAR VERTEBRAL DISC, OPEN APPROACH: ICD-10-PCS | Performed by: NEUROLOGICAL SURGERY

## 2022-02-25 PROCEDURE — 0QP004Z REMOVAL OF INTERNAL FIXATION DEVICE FROM LUMBAR VERTEBRA, OPEN APPROACH: ICD-10-PCS | Performed by: NEUROLOGICAL SURGERY

## 2022-02-25 PROCEDURE — 360N000085 HC SURGERY LEVEL 5 W/ FLUORO, PER MIN: Performed by: NEUROLOGICAL SURGERY

## 2022-02-25 PROCEDURE — 999N000141 HC STATISTIC PRE-PROCEDURE NURSING ASSESSMENT: Performed by: NEUROLOGICAL SURGERY

## 2022-02-25 PROCEDURE — 250N000025 HC SEVOFLURANE, PER MIN: Performed by: NEUROLOGICAL SURGERY

## 2022-02-25 PROCEDURE — 250N000013 HC RX MED GY IP 250 OP 250 PS 637: Performed by: NURSE PRACTITIONER

## 2022-02-25 DEVICE — IMP SPI INTERBODY MEDT CATALYFT PL LONG 7MM 6068076: Type: IMPLANTABLE DEVICE | Site: SPINE LUMBAR | Status: FUNCTIONAL

## 2022-02-25 DEVICE — IMP ROD MEDT SOLERA CVD 5.5X30MM TI 1553201030: Type: IMPLANTABLE DEVICE | Site: SPINE LUMBAR | Status: FUNCTIONAL

## 2022-02-25 DEVICE — GRAFT BONE FIBERS DBF 9ML INJ T50309 PRELOADED: Type: IMPLANTABLE DEVICE | Site: SPINE LUMBAR | Status: FUNCTIONAL

## 2022-02-25 DEVICE — IMPLANTABLE DEVICE: Type: IMPLANTABLE DEVICE | Site: SPINE LUMBAR | Status: FUNCTIONAL

## 2022-02-25 RX ORDER — HYDROXYZINE HYDROCHLORIDE 25 MG/1
25 TABLET, FILM COATED ORAL EVERY 6 HOURS PRN
Status: DISCONTINUED | OUTPATIENT
Start: 2022-02-25 | End: 2022-02-26

## 2022-02-25 RX ORDER — POLYETHYLENE GLYCOL 3350 17 G/17G
17 POWDER, FOR SOLUTION ORAL DAILY
Status: DISCONTINUED | OUTPATIENT
Start: 2022-02-26 | End: 2022-03-02 | Stop reason: HOSPADM

## 2022-02-25 RX ORDER — AMOXICILLIN 250 MG
1 CAPSULE ORAL 2 TIMES DAILY
Status: DISCONTINUED | OUTPATIENT
Start: 2022-02-25 | End: 2022-03-02 | Stop reason: HOSPADM

## 2022-02-25 RX ORDER — ACETAMINOPHEN 325 MG/1
975 TABLET ORAL EVERY 8 HOURS
Status: COMPLETED | OUTPATIENT
Start: 2022-02-25 | End: 2022-02-28

## 2022-02-25 RX ORDER — HYDROMORPHONE HCL IN WATER/PF 6 MG/30 ML
0.4 PATIENT CONTROLLED ANALGESIA SYRINGE INTRAVENOUS
Status: DISCONTINUED | OUTPATIENT
Start: 2022-02-25 | End: 2022-03-02 | Stop reason: HOSPADM

## 2022-02-25 RX ORDER — PROPOFOL 10 MG/ML
INJECTION, EMULSION INTRAVENOUS PRN
Status: DISCONTINUED | OUTPATIENT
Start: 2022-02-25 | End: 2022-02-25

## 2022-02-25 RX ORDER — BISACODYL 10 MG
10 SUPPOSITORY, RECTAL RECTAL DAILY PRN
Status: DISCONTINUED | OUTPATIENT
Start: 2022-02-25 | End: 2022-03-02 | Stop reason: HOSPADM

## 2022-02-25 RX ORDER — SODIUM CHLORIDE, SODIUM LACTATE, POTASSIUM CHLORIDE, CALCIUM CHLORIDE 600; 310; 30; 20 MG/100ML; MG/100ML; MG/100ML; MG/100ML
INJECTION, SOLUTION INTRAVENOUS CONTINUOUS
Status: DISCONTINUED | OUTPATIENT
Start: 2022-02-25 | End: 2022-02-25 | Stop reason: HOSPADM

## 2022-02-25 RX ORDER — FENTANYL CITRATE 50 UG/ML
50 INJECTION, SOLUTION INTRAMUSCULAR; INTRAVENOUS EVERY 5 MIN PRN
Status: DISCONTINUED | OUTPATIENT
Start: 2022-02-25 | End: 2022-02-25 | Stop reason: HOSPADM

## 2022-02-25 RX ORDER — PROPOFOL 10 MG/ML
INJECTION, EMULSION INTRAVENOUS CONTINUOUS PRN
Status: DISCONTINUED | OUTPATIENT
Start: 2022-02-25 | End: 2022-02-25

## 2022-02-25 RX ORDER — LABETALOL HYDROCHLORIDE 5 MG/ML
10 INJECTION, SOLUTION INTRAVENOUS
Status: DISCONTINUED | OUTPATIENT
Start: 2022-02-25 | End: 2022-02-25 | Stop reason: HOSPADM

## 2022-02-25 RX ORDER — HYDROMORPHONE HCL IN WATER/PF 6 MG/30 ML
0.2 PATIENT CONTROLLED ANALGESIA SYRINGE INTRAVENOUS
Status: DISCONTINUED | OUTPATIENT
Start: 2022-02-25 | End: 2022-03-02 | Stop reason: HOSPADM

## 2022-02-25 RX ORDER — BUPIVACAINE HYDROCHLORIDE AND EPINEPHRINE 5; 5 MG/ML; UG/ML
INJECTION, SOLUTION PERINEURAL PRN
Status: DISCONTINUED | OUTPATIENT
Start: 2022-02-25 | End: 2022-02-25 | Stop reason: HOSPADM

## 2022-02-25 RX ORDER — ONDANSETRON 2 MG/ML
4 INJECTION INTRAMUSCULAR; INTRAVENOUS EVERY 30 MIN PRN
Status: DISCONTINUED | OUTPATIENT
Start: 2022-02-25 | End: 2022-02-25 | Stop reason: HOSPADM

## 2022-02-25 RX ORDER — OXYCODONE HYDROCHLORIDE 5 MG/1
10 TABLET ORAL EVERY 4 HOURS PRN
Status: DISCONTINUED | OUTPATIENT
Start: 2022-02-25 | End: 2022-03-01

## 2022-02-25 RX ORDER — ACETAMINOPHEN 325 MG/1
650 TABLET ORAL EVERY 4 HOURS PRN
Status: DISCONTINUED | OUTPATIENT
Start: 2022-02-28 | End: 2022-03-02 | Stop reason: HOSPADM

## 2022-02-25 RX ORDER — CEFAZOLIN SODIUM 1 G/3ML
1 INJECTION, POWDER, FOR SOLUTION INTRAMUSCULAR; INTRAVENOUS EVERY 8 HOURS
Status: DISCONTINUED | OUTPATIENT
Start: 2022-02-25 | End: 2022-03-02 | Stop reason: HOSPADM

## 2022-02-25 RX ORDER — LIDOCAINE 40 MG/G
CREAM TOPICAL
Status: DISCONTINUED | OUTPATIENT
Start: 2022-02-25 | End: 2022-03-02 | Stop reason: HOSPADM

## 2022-02-25 RX ORDER — CEFAZOLIN SODIUM/WATER 2 G/20 ML
2 SYRINGE (ML) INTRAVENOUS SEE ADMIN INSTRUCTIONS
Status: DISCONTINUED | OUTPATIENT
Start: 2022-02-25 | End: 2022-02-25 | Stop reason: HOSPADM

## 2022-02-25 RX ORDER — NEOSTIGMINE METHYLSULFATE 1 MG/ML
VIAL (ML) INJECTION PRN
Status: DISCONTINUED | OUTPATIENT
Start: 2022-02-25 | End: 2022-02-25

## 2022-02-25 RX ORDER — LIDOCAINE HYDROCHLORIDE 20 MG/ML
INJECTION, SOLUTION INFILTRATION; PERINEURAL PRN
Status: DISCONTINUED | OUTPATIENT
Start: 2022-02-25 | End: 2022-02-25

## 2022-02-25 RX ORDER — SODIUM CHLORIDE 9 MG/ML
INJECTION, SOLUTION INTRAVENOUS CONTINUOUS
Status: DISCONTINUED | OUTPATIENT
Start: 2022-02-25 | End: 2022-03-02 | Stop reason: HOSPADM

## 2022-02-25 RX ORDER — ONDANSETRON 4 MG/1
4 TABLET, ORALLY DISINTEGRATING ORAL EVERY 6 HOURS PRN
Status: DISCONTINUED | OUTPATIENT
Start: 2022-02-25 | End: 2022-03-02 | Stop reason: HOSPADM

## 2022-02-25 RX ORDER — ONDANSETRON 2 MG/ML
INJECTION INTRAMUSCULAR; INTRAVENOUS PRN
Status: DISCONTINUED | OUTPATIENT
Start: 2022-02-25 | End: 2022-02-25

## 2022-02-25 RX ORDER — EPHEDRINE SULFATE 50 MG/ML
INJECTION, SOLUTION INTRAMUSCULAR; INTRAVENOUS; SUBCUTANEOUS PRN
Status: DISCONTINUED | OUTPATIENT
Start: 2022-02-25 | End: 2022-02-25

## 2022-02-25 RX ORDER — GABAPENTIN 300 MG/1
300 CAPSULE ORAL
Status: COMPLETED | OUTPATIENT
Start: 2022-02-25 | End: 2022-02-25

## 2022-02-25 RX ORDER — HYDRALAZINE HYDROCHLORIDE 20 MG/ML
2.5-5 INJECTION INTRAMUSCULAR; INTRAVENOUS EVERY 10 MIN PRN
Status: DISCONTINUED | OUTPATIENT
Start: 2022-02-25 | End: 2022-02-25 | Stop reason: HOSPADM

## 2022-02-25 RX ORDER — CEFAZOLIN SODIUM/WATER 2 G/20 ML
2 SYRINGE (ML) INTRAVENOUS
Status: COMPLETED | OUTPATIENT
Start: 2022-02-25 | End: 2022-02-25

## 2022-02-25 RX ORDER — ALBUMIN, HUMAN INJ 5% 5 %
SOLUTION INTRAVENOUS CONTINUOUS PRN
Status: DISCONTINUED | OUTPATIENT
Start: 2022-02-25 | End: 2022-02-25

## 2022-02-25 RX ORDER — OXYCODONE HYDROCHLORIDE 5 MG/1
5 TABLET ORAL EVERY 4 HOURS PRN
Status: DISCONTINUED | OUTPATIENT
Start: 2022-02-25 | End: 2022-03-01

## 2022-02-25 RX ORDER — OXYCODONE HYDROCHLORIDE 5 MG/1
5 TABLET ORAL EVERY 4 HOURS PRN
Status: DISCONTINUED | OUTPATIENT
Start: 2022-02-25 | End: 2022-02-25 | Stop reason: HOSPADM

## 2022-02-25 RX ORDER — ONDANSETRON 4 MG/1
4 TABLET, ORALLY DISINTEGRATING ORAL EVERY 30 MIN PRN
Status: DISCONTINUED | OUTPATIENT
Start: 2022-02-25 | End: 2022-02-25 | Stop reason: HOSPADM

## 2022-02-25 RX ORDER — ONDANSETRON 2 MG/ML
4 INJECTION INTRAMUSCULAR; INTRAVENOUS EVERY 6 HOURS PRN
Status: DISCONTINUED | OUTPATIENT
Start: 2022-02-25 | End: 2022-03-02 | Stop reason: HOSPADM

## 2022-02-25 RX ORDER — CALCIUM CARBONATE 500 MG/1
500 TABLET, CHEWABLE ORAL 4 TIMES DAILY PRN
Status: DISCONTINUED | OUTPATIENT
Start: 2022-02-25 | End: 2022-03-02 | Stop reason: HOSPADM

## 2022-02-25 RX ORDER — KETOROLAC TROMETHAMINE 5 MG/ML
1 SOLUTION OPHTHALMIC 4 TIMES DAILY
Status: DISCONTINUED | OUTPATIENT
Start: 2022-02-25 | End: 2022-03-02 | Stop reason: HOSPADM

## 2022-02-25 RX ORDER — ESCITALOPRAM OXALATE 10 MG/1
20 TABLET ORAL DAILY
Status: DISCONTINUED | OUTPATIENT
Start: 2022-02-26 | End: 2022-03-02 | Stop reason: HOSPADM

## 2022-02-25 RX ORDER — GLYCOPYRROLATE 0.2 MG/ML
INJECTION, SOLUTION INTRAMUSCULAR; INTRAVENOUS PRN
Status: DISCONTINUED | OUTPATIENT
Start: 2022-02-25 | End: 2022-02-25

## 2022-02-25 RX ORDER — KETAMINE HYDROCHLORIDE 10 MG/ML
INJECTION INTRAMUSCULAR; INTRAVENOUS PRN
Status: DISCONTINUED | OUTPATIENT
Start: 2022-02-25 | End: 2022-02-25

## 2022-02-25 RX ORDER — METHOCARBAMOL 750 MG/1
750 TABLET, FILM COATED ORAL EVERY 6 HOURS PRN
Status: DISCONTINUED | OUTPATIENT
Start: 2022-02-25 | End: 2022-02-26

## 2022-02-25 RX ORDER — HYDROMORPHONE HCL IN WATER/PF 6 MG/30 ML
0.4 PATIENT CONTROLLED ANALGESIA SYRINGE INTRAVENOUS EVERY 5 MIN PRN
Status: DISCONTINUED | OUTPATIENT
Start: 2022-02-25 | End: 2022-02-25 | Stop reason: HOSPADM

## 2022-02-25 RX ORDER — FENTANYL CITRATE 50 UG/ML
INJECTION, SOLUTION INTRAMUSCULAR; INTRAVENOUS PRN
Status: DISCONTINUED | OUTPATIENT
Start: 2022-02-25 | End: 2022-02-25

## 2022-02-25 RX ADMIN — PHENYLEPHRINE HYDROCHLORIDE 100 MCG: 10 INJECTION INTRAVENOUS at 10:13

## 2022-02-25 RX ADMIN — OXYCODONE HYDROCHLORIDE 10 MG: 5 TABLET ORAL at 22:57

## 2022-02-25 RX ADMIN — GABAPENTIN 300 MG: 300 CAPSULE ORAL at 07:02

## 2022-02-25 RX ADMIN — ROCURONIUM BROMIDE 10 MG: 50 INJECTION, SOLUTION INTRAVENOUS at 11:30

## 2022-02-25 RX ADMIN — HYDROMORPHONE HYDROCHLORIDE 0.4 MG: 0.2 INJECTION, SOLUTION INTRAMUSCULAR; INTRAVENOUS; SUBCUTANEOUS at 15:20

## 2022-02-25 RX ADMIN — Medication 10 MG: at 07:51

## 2022-02-25 RX ADMIN — Medication 10 MG: at 12:35

## 2022-02-25 RX ADMIN — ROCURONIUM BROMIDE 10 MG: 50 INJECTION, SOLUTION INTRAVENOUS at 10:47

## 2022-02-25 RX ADMIN — SODIUM CHLORIDE: 9 INJECTION, SOLUTION INTRAVENOUS at 17:54

## 2022-02-25 RX ADMIN — HYDROMORPHONE HYDROCHLORIDE 0.5 MG: 1 INJECTION, SOLUTION INTRAMUSCULAR; INTRAVENOUS; SUBCUTANEOUS at 13:54

## 2022-02-25 RX ADMIN — ROCURONIUM BROMIDE 20 MG: 50 INJECTION, SOLUTION INTRAVENOUS at 08:32

## 2022-02-25 RX ADMIN — PROPOFOL 200 MG: 10 INJECTION, EMULSION INTRAVENOUS at 07:48

## 2022-02-25 RX ADMIN — PHENYLEPHRINE HYDROCHLORIDE 100 MCG: 10 INJECTION INTRAVENOUS at 08:09

## 2022-02-25 RX ADMIN — FENTANYL CITRATE 50 MCG: 50 INJECTION, SOLUTION INTRAMUSCULAR; INTRAVENOUS at 14:40

## 2022-02-25 RX ADMIN — PROPOFOL 25 MCG/KG/MIN: 10 INJECTION, EMULSION INTRAVENOUS at 08:15

## 2022-02-25 RX ADMIN — KETOROLAC TROMETHAMINE 1 DROP: 5 SOLUTION OPHTHALMIC at 15:32

## 2022-02-25 RX ADMIN — SENNOSIDES AND DOCUSATE SODIUM 1 TABLET: 50; 8.6 TABLET ORAL at 20:15

## 2022-02-25 RX ADMIN — MIDAZOLAM 2 MG: 1 INJECTION INTRAMUSCULAR; INTRAVENOUS at 07:43

## 2022-02-25 RX ADMIN — HYDROMORPHONE HYDROCHLORIDE 0.4 MG: 0.2 INJECTION, SOLUTION INTRAMUSCULAR; INTRAVENOUS; SUBCUTANEOUS at 20:04

## 2022-02-25 RX ADMIN — CEFAZOLIN 1 G: 1 INJECTION, POWDER, FOR SOLUTION INTRAMUSCULAR; INTRAVENOUS at 20:10

## 2022-02-25 RX ADMIN — Medication 2 G: at 07:43

## 2022-02-25 RX ADMIN — GLYCOPYRROLATE 0.6 MG: 0.2 INJECTION, SOLUTION INTRAMUSCULAR; INTRAVENOUS at 13:23

## 2022-02-25 RX ADMIN — ROCURONIUM BROMIDE 20 MG: 50 INJECTION, SOLUTION INTRAVENOUS at 10:06

## 2022-02-25 RX ADMIN — Medication 10 MG: at 13:17

## 2022-02-25 RX ADMIN — Medication 10 MG: at 10:45

## 2022-02-25 RX ADMIN — Medication 10 MG: at 09:27

## 2022-02-25 RX ADMIN — ALBUMIN HUMAN: 0.05 INJECTION, SOLUTION INTRAVENOUS at 12:32

## 2022-02-25 RX ADMIN — FENTANYL CITRATE 100 MCG: 50 INJECTION, SOLUTION INTRAMUSCULAR; INTRAVENOUS at 07:48

## 2022-02-25 RX ADMIN — LIDOCAINE HYDROCHLORIDE 100 MG: 20 INJECTION, SOLUTION INFILTRATION; PERINEURAL at 07:48

## 2022-02-25 RX ADMIN — ROCURONIUM BROMIDE 50 MG: 50 INJECTION, SOLUTION INTRAVENOUS at 07:50

## 2022-02-25 RX ADMIN — ROCURONIUM BROMIDE 5 MG: 50 INJECTION, SOLUTION INTRAVENOUS at 12:15

## 2022-02-25 RX ADMIN — PHENYLEPHRINE HYDROCHLORIDE 100 MCG: 10 INJECTION INTRAVENOUS at 10:57

## 2022-02-25 RX ADMIN — SODIUM CHLORIDE, POTASSIUM CHLORIDE, SODIUM LACTATE AND CALCIUM CHLORIDE: 600; 310; 30; 20 INJECTION, SOLUTION INTRAVENOUS at 15:05

## 2022-02-25 RX ADMIN — NEOSTIGMINE METHYLSULFATE 4 MG: 1 INJECTION, SOLUTION INTRAVENOUS at 13:23

## 2022-02-25 RX ADMIN — ONDANSETRON 4 MG: 2 INJECTION INTRAMUSCULAR; INTRAVENOUS at 13:17

## 2022-02-25 RX ADMIN — HYDROMORPHONE HYDROCHLORIDE 0.2 MG: 0.2 INJECTION, SOLUTION INTRAMUSCULAR; INTRAVENOUS; SUBCUTANEOUS at 18:00

## 2022-02-25 RX ADMIN — Medication 10 MG: at 08:35

## 2022-02-25 RX ADMIN — Medication 2 G: at 11:50

## 2022-02-25 RX ADMIN — KETOROLAC TROMETHAMINE 1 DROP: 5 SOLUTION OPHTHALMIC at 20:17

## 2022-02-25 RX ADMIN — PHENYLEPHRINE HYDROCHLORIDE 100 MCG: 10 INJECTION INTRAVENOUS at 12:14

## 2022-02-25 RX ADMIN — HYDROMORPHONE HYDROCHLORIDE 0.4 MG: 0.2 INJECTION, SOLUTION INTRAMUSCULAR; INTRAVENOUS; SUBCUTANEOUS at 14:47

## 2022-02-25 RX ADMIN — DEXMEDETOMIDINE HYDROCHLORIDE 0.3 MCG/KG/HR: 100 INJECTION, SOLUTION INTRAVENOUS at 08:27

## 2022-02-25 RX ADMIN — PHENYLEPHRINE HYDROCHLORIDE 0.3 MCG/KG/MIN: 10 INJECTION INTRAVENOUS at 08:15

## 2022-02-25 RX ADMIN — SODIUM CHLORIDE, POTASSIUM CHLORIDE, SODIUM LACTATE AND CALCIUM CHLORIDE: 600; 310; 30; 20 INJECTION, SOLUTION INTRAVENOUS at 07:02

## 2022-02-25 RX ADMIN — PHENYLEPHRINE HYDROCHLORIDE 100 MCG: 10 INJECTION INTRAVENOUS at 07:57

## 2022-02-25 RX ADMIN — SODIUM CHLORIDE, POTASSIUM CHLORIDE, SODIUM LACTATE AND CALCIUM CHLORIDE: 600; 310; 30; 20 INJECTION, SOLUTION INTRAVENOUS at 11:25

## 2022-02-25 RX ADMIN — ROCURONIUM BROMIDE 5 MG: 50 INJECTION, SOLUTION INTRAVENOUS at 12:38

## 2022-02-25 RX ADMIN — PHENYLEPHRINE HYDROCHLORIDE 100 MCG: 10 INJECTION INTRAVENOUS at 12:11

## 2022-02-25 RX ADMIN — ROCURONIUM BROMIDE 10 MG: 50 INJECTION, SOLUTION INTRAVENOUS at 09:20

## 2022-02-25 RX ADMIN — Medication 10 MG: at 11:33

## 2022-02-25 RX ADMIN — ACETAMINOPHEN 975 MG: 325 TABLET, FILM COATED ORAL at 17:59

## 2022-02-25 ASSESSMENT — ACTIVITIES OF DAILY LIVING (ADL)
ADLS_ACUITY_SCORE: 8
ADLS_ACUITY_SCORE: 12
ADLS_ACUITY_SCORE: 8

## 2022-02-25 ASSESSMENT — COPD QUESTIONNAIRES: COPD: 1

## 2022-02-25 ASSESSMENT — LIFESTYLE VARIABLES: TOBACCO_USE: 1

## 2022-02-25 NOTE — PROGRESS NOTES
"Per Dr. Adler's note on 2/3/2021  \"He is off all chronic narcotics including the Suboxone\"    And Dr. Cook's note on 11/2/2021  \"He has been weaned off his narcotics which he used to be on at significant dosages\"     "

## 2022-02-25 NOTE — OR NURSING
1515hr - MD Stallings re-rounded on Pt.   Pt awake; sleeping on/off. Pt interactive w/MD. MD notified JASMEET bulb not holding compression hence needed frequent re-compression.  Continue to compress JASMEET as needed per MD Stallings.

## 2022-02-25 NOTE — TELEPHONE ENCOUNTER
"Per Dalia Patel NP patient states he is taking Suboxone and that it was prescribed in [place]. The patient does not know any further information regarding this.    Patient s/p Lumbar 4 to Sacral 1 Extension Posterior Spinal Fusion with Lumbar 5 to Sacral 1 Hardware Removal and Left Lumbar 1 to Lumbar 2 and Lumbar 3 to Lumbar 4 microdiscectomies done by Dr. Stallings on 2/25/2022    Dalia requesting nursing staff to find out who prescribed this, and what their recommendation is for the patient's regimen post op.     Placed call to [facility] in [place] [phone] where he appears to be filling his other medications. Sandy stated that he has not filled suboxone there.     Writer called the [facility] in [place], [place] at [phone]. The pharmacy has never serviced the patient. This is the only pharmacy per the Internet in [place].    Called patient's PCP Dr. Steve Adler at [phone]. Their office is closed on Fridays. Left voicemail to return our call.     A miscellaneous note on 3/11/2022, Nai Moctezuma asked Dr. Armin Lee,  \"Last appt 11-3-2020, no upcoming appt , how to proceed? Suboxone\". Dr. Lee's response was, \"off the program\"    Placed a call to Dr. Armin Lee with Family Medicine at [facility] [phone]. Left a voicemail on the RN line to return our call.     Yennifer from Dr. Lee's office returned call. She states that Dr. Lee last filled the patients saboxone in August of 2020. She states the rx was for 8-2mg tabs, 1 tab TID. Pt was instructed to follow-up after with a UDS, per Yennifer the patient never followed up and the clinic hasn't worked with the patient since.      Routed to Dalia Patel NP to update her      "

## 2022-02-25 NOTE — ANESTHESIA PROCEDURE NOTES
Airway       Patient location during procedure: OR       Procedure Start/Stop Times: 2/25/2022 7:51 AM  Staff -        Anesthesiologist:  Efrain He MD       CRNA: Suhail Morales APRN CRNA       Other Anesthesia Staff: Smitha Banda       Performed By: KRISTA and with CRNAs       Procedure performed by resident/fellow/CRNA in presence of a teaching physician.    Consent for Airway        Urgency: elective  Indications and Patient Condition       Indications for airway management: andrews-procedural       Induction type:intravenous       Mask difficulty assessment: 2 - vent by mask + OA or adjuvant +/- NMBA    Final Airway Details       Final airway type: endotracheal airway       Successful airway: Oral and ETT - single  Endotracheal Airway Details        ETT size (mm): 8.0       Cuffed: yes       Successful intubation technique: direct laryngoscopy       DL Blade Type: MAC 3       Grade View of Cords: 1       Adjucts: stylet       Position: Right       Measured from: gums/teeth       Secured at (cm): 23       Bite block used: None    Post intubation assessment        Placement verified by: capnometry, equal breath sounds and chest rise        Number of attempts at approach: 1       Number of other approaches attempted: 0       Secured with: pink tape and plastic tape       Ease of procedure: easy       Dentition: Intact and Unchanged

## 2022-02-25 NOTE — OR NURSING
1448hr - DHARMESH Rodríguez present on unit notified Pt c/o Rt eye discomfort  Okay to irrigate Rt eye w/NS and close monitoring to continue per DHARMESH Rodríguez.  1450hr - DHARMESH He now rounding on Pt,   Pt sleeping on/off. DHARMESH notified Pt c/o Rt eye discomfort.  DHARMESH He stated will enter orders for Toradol eye gtt for Rt eye discomfort.  DHARMESH Rodríguez to take over pt cares per DHARMESH He.

## 2022-02-25 NOTE — ANESTHESIA PREPROCEDURE EVALUATION
Anesthesia Pre-Procedure Evaluation    Patient: Waqas Lechuga   MRN: [id number] : 1965        Procedure : Procedure(s):  Lumbar 4 to Sacral 1 Extension Posterior Spinal Fusion with Lumbar 5 to Sacral 1 Hardware Removal, Left Lumbar 1 to Lumbar 2 and Lumbar 3 to Lumbar 4 microdiscectomies  Lumbar 4 to Sacral 1 Extension Posterior Spinal Fusion with Lumbar 5 to Sacral 1 Hardware Removal, Left Lumbar 1 to Lumbar 2 and Lumbar 3 to Lumbar 4 microdiscectomies          Past Medical History:   Diagnosis Date     Alcohol abuse      Anxiety      Cardiac angina (H)      Chemical dependency (H)     meth     Chronic midline low back pain      Compartment syndrome (H)      DDD (degenerative disc disease), lumbar      Drug-seeking behavior      Eczema      HLD (hyperlipidemia)      HTN (hypertension)      Insomnia      MDD (major depressive disorder)      MI (myocardial infarction) (H)      Migraine      Migraine with status migrainosus, not intractable      Opioid dependence (H)      Other circadian rhythm sleep disorder      PTSD (post-traumatic stress disorder)      Pulmonary nodules      Syncope       Past Surgical History:   Procedure Laterality Date     CORONARY STENT PLACEMENT       HAND SURGERY       INGUINAL HERNIA REPAIR       LUMBAR FUSION        Allergies   Allergen Reactions     Prochlorperazine Other (See Comments)     OHC Comment: Dystonic rxn; OHC Reaction: Other  Other reaction(s): *Unknown     Meperidine Rash     Trazodone Other (See Comments) and Unknown     Erection that lasted a long time  OHC Comment: Patient states he gets an erection. ; OHC Reaction: Unknown; OHC Reaction: 17; OHC Severity: Low; OHC Noted: 2015  Erection that lasted a long time  Patient states he gets an erection.         Social History     Tobacco Use     Smoking status: Current Every Day Smoker     Packs/day: 0.25     Types: Cigarettes     Smokeless tobacco: Never Used   Substance Use Topics     Alcohol use: Not Currently       Wt Readings from Last 1 Encounters:   01/27/22 78 kg (171 lb 14.4 oz)        Anesthesia Evaluation   Pt has had prior anesthetic.     No history of anesthetic complications       ROS/MED HX  ENT/Pulmonary:     (+) tobacco use, Current use, COPD,  (-) sleep apnea   Neurologic:     (+) migraines,     Cardiovascular:     (+) Dyslipidemia hypertension--CAD -past MI -stent-fainting (syncope).     METS/Exercise Tolerance:     Hematologic:       Musculoskeletal: Comment: Back pain      GI/Hepatic:    (-) GERD   Renal/Genitourinary:  - neg Renal ROS     Endo:    (-) Type II DM   Psychiatric/Substance Use:     (+) psychiatric history anxiety H/O chronic opiod use . Recreational drug usage: Meth.    Infectious Disease:       Malignancy:       Other:            Physical Exam    Airway        Mallampati: II   TM distance: > 3 FB   Neck ROM: full   Mouth opening: > 3 cm    Respiratory Devices and Support         Dental       (+) missing and other      Cardiovascular   cardiovascular exam normal          Pulmonary   pulmonary exam normal                OUTSIDE LABS:  CBC: No results found for: WBC, HGB, HCT, PLT  BMP: No results found for: NA, POTASSIUM, CHLORIDE, CO2, BUN, CR, GLC  COAGS: No results found for: PTT, INR, FIBR  POC: No results found for: BGM, HCG, HCGS  HEPATIC: No results found for: ALBUMIN, PROTTOTAL, ALT, AST, GGT, ALKPHOS, BILITOTAL, BILIDIRECT, ROXANA  OTHER: No results found for: PH, LACT, A1C, NAHOMI, PHOS, MAG, LIPASE, AMYLASE, TSH, T4, T3, CRP, SED    Anesthesia Plan    ASA Status:  3   NPO Status:  NPO Appropriate    Anesthesia Type: General.     - Airway: ETT   Induction: Intravenous, Propofol.   Maintenance: Balanced.   Techniques and Equipment:     - Lines/Monitors: 2nd IV     Consents    Anesthesia Plan(s) and associated risks, benefits, and realistic alternatives discussed. Questions answered and patient/representative(s) expressed understanding.    - Discussed:     - Discussed with:  Patient          Postoperative Care    Pain management: Multi-modal analgesia.   PONV prophylaxis: Ondansetron (or other 5HT-3)     Comments:                Efrain He MD

## 2022-02-25 NOTE — ANESTHESIA CARE TRANSFER NOTE
Patient: Waqas Lechuga    Procedure: Procedure(s):  Lumbar 4 to Sacral 1 Extension Posterior Spinal Fusion with Lumbar 5 to Sacral 1 Hardware Removal  Left Lumbar 1 to Lumbar 2 and Lumbar 3 to Lumbar 4 microdiscectomies       Diagnosis: Spinal stenosis of lumbar region, unspecified whether neurogenic claudication present [M48.061]  Diagnosis Additional Information: No value filed.    Anesthesia Type:   General     Note:    Oropharynx: oropharynx clear of all foreign objects  Level of Consciousness: drowsy  Oxygen Supplementation: face mask  Level of Supplemental Oxygen (L/min / FiO2): 6  Independent Airway: airway patency satisfactory and stable  Dentition: dentition unchanged  Vital Signs Stable: post-procedure vital signs reviewed and stable  Report to RN Given: handoff report given  Patient transferred to: PACU    Handoff Report: Identifed the Patient, Identified the Reponsible Provider, Reviewed the pertinent medical history, Discussed the surgical course, Reviewed Intra-OP anesthesia mangement and issues during anesthesia, Set expectations for post-procedure period and Allowed opportunity for questions and acknowledgement of understanding      Electronically Signed By: HOME Benavidez CRNA  February 25, 2022  2:00 PM

## 2022-02-25 NOTE — OP NOTE
Date of surgery: February 25, 2022  Surgeon: Demetri Stallings MD  Assistant: Dalia Patel NP  Note: Dalia Patel was present for and assisted with the entire surgery, and his/her role as an assistant was crucial for aid in positioning, exposure, suctioning, retraction, and closure      Preoperative diagnosis: History of prior L5-S1 fusion, Adjacent segment L4-5 degeneration and stenosis, Lumbar disc herniations and radiculopathy  Postoperative diagnosis: History of prior L5-S1 fusion, Adjacent segment L4-5 degeneration and stenosis, Lumbar disc herniations and radiculopathy      Procedure:  1.  Removal of L5-S1 bilateral pedicle screws and rods  2.  Exploration of prior L5-S1 fusion  3.  L4-L5 posterior segmental instrumentation with insertion of bilateral pedicle screws and rods (Medtronic Module X)  4.  L4-L5 bilateral laminectomies for decompression of stenosis  5.  Left L4-L5 complete facetectomy, transforaminal discectomy, and interbody arthrodesis  6.  L4-L5 insertion of intervertebral graft with autograft and allograft  7.  L4-L5 posterolateral arthrodesis and fusion with autograft and allograft  8.  L3-L4 left laminotomies, medial facetectomy and microdiscectomy  9.  L1-L2 minimally invasive left laminotomies, medial facetectomy and microdiscectomy  10.  Use of intraoperative microscope and fluoroscopy  11.  Use of Fancorps robot, O-arm intraoperative CT scan, and Stealth navigation      EBL: 500 mL      Indications: 56 year old male with prior L5-S1 fusion, presented with severe back and leg pain with weakness.  Imaging showed L4-5 severe adjacent segment degeneration with stenosis, and extruded left sided disc herniations at L1-L2, and L3-L4.  Underwent extensive non-operative management with failure to improve.  Risks, benefits, indications, and alternatives were discussed with the patient in detail, and he wished to proceed.      Description of surgery: The patient was positioned  prone.  Sterile prepping and draping procedures were performed.  Antibiotics were administered and timeout was performed.  A midline lumbar incision was performed.  The monopolar was used to expose the spinous processes, lamina, facets, and transverse processes from L4-S1.  The previous hardware was visualized and identified as Medtronic.  Using removal instrumentation, bilateral pedicle screws and rods were removed at L5-S1.  The previous fusion was explored and no overt instability was identified.  The [employer] robot was registered, and under robotic guidance, bilateral pedicle screws were inserted at L4 and L5.  An O-arm intraoperative CT scan was performed, which identified the hardware to be in appropriate position.  The Leksell rongeurs were then used to remove the spinous process at L4-L5.  The microscope was brought into the field, and under microscopy, the high speed drill was then used to perform bilateral laminectomies.  The Kerrison rongeurs were then used to remove the ligamentum flavum with decompression of the nerve roots.  The drill was used to perform a complete left facetectomy.  The 15 blade was used to perform an annulotomy in the disk space at L4-L5.  A complete discectomy was performed with henrietta, the pituitary rongeurs, and curets.  Interbody arthrodesis was performed with henrietta and curets.   An intervertebral graft was inserted into the disk space at L4-L5 with autograft and allograft.  Next, the monopolar was used to expose the left L3 hemilamina and medial L3-4 facet.  The microscope was brought into the field, and under microscopy, laminotomies and medial facetectomy were performed with the high speed drill.  The kerrison rongeur was used to remove the ligamentum flavum, and the thecal sac and nerve root were exposed.  The nerve root was retracted medially, and the extruded disk fragment was removed with the pituitary rongeurs.   Rods and set screws were inserted.  Antibiotic  irrigation was performed.  Hemostasis was achieved.  The bilateral transverse processes at L4-5 were arthrodesed, and autograft and allograft were packed for posterolateral fusion.  Fluoroscopy demonstrated excellent positioning of the hardware.  The fascia was closed with 0-Vicryl sutures, and the dermal layer was closed with 2-0 vicryl sutures.  The skin was closed with staples.  Next, a separate lumbar incision was performed at L1-L2.  The minimally invasive dilating system was used to dock on the hemilamina at L1-L2.  The microscope was brought into the field, and under microscopy, laminotomies and medial facetectomy were performed with the high speed drill.  The kerrison rongeur was used to remove the ligamentum flavum, and the thecal sac and nerve root were exposed.  The nerve root was retracted medially, and the extruded disk fragment was removed with the pituitary rongeurs.  Hemostasis was achieved and antibiotic irrigation was performed.  The fascia was closed with 0-Vicryl sutures, and the dermal layer was closed with 2-0 vicryl sutures, and the skin was closed.  There were no intraprocedural complications.

## 2022-02-25 NOTE — ANESTHESIA POSTPROCEDURE EVALUATION
Patient: Waqas Lechuga    Procedure: Procedure(s):  Lumbar 4 to Sacral 1 Extension Posterior Spinal Fusion with Lumbar 5 to Sacral 1 Hardware Removal  Left Lumbar 1 to Lumbar 2 and Lumbar 3 to Lumbar 4 microdiscectomies       Anesthesia Type:  General    Note:  Disposition: Admission   Postop Pain Control: Uneventful            Sign Out: Well controlled pain   PONV: No   Neuro/Psych: Uneventful            Sign Out: Acceptable/Baseline neuro status   Airway/Respiratory: Uneventful            Sign Out: Acceptable/Baseline resp. status   CV/Hemodynamics: Uneventful            Sign Out: Acceptable CV status; No obvious hypovolemia; No obvious fluid overload   Other NRE: NONE   DID A NON-ROUTINE EVENT OCCUR? No           Last vitals:  Vitals Value Taken Time   /68 02/25/22 1430   Temp 36.3  C (97.3  F) 02/25/22 1430   Pulse 76 02/25/22 1436   Resp 11 02/25/22 1436   SpO2 99 % 02/25/22 1436   Vitals shown include unvalidated device data.    Electronically Signed By: Efrain He MD  February 25, 2022  2:37 PM

## 2022-02-26 ENCOUNTER — APPOINTMENT (OUTPATIENT)
Dept: PHYSICAL THERAPY | Facility: CLINIC | Age: 57
DRG: 454 | End: 2022-02-26
Attending: NURSE PRACTITIONER
Payer: OTHER MISCELLANEOUS

## 2022-02-26 LAB
GLUCOSE BLDC GLUCOMTR-MCNC: 97 MG/DL (ref 70–99)
HGB BLD-MCNC: 7.8 G/DL (ref 13.3–17.7)

## 2022-02-26 PROCEDURE — 258N000003 HC RX IP 258 OP 636: Performed by: NURSE PRACTITIONER

## 2022-02-26 PROCEDURE — 97530 THERAPEUTIC ACTIVITIES: CPT | Mod: GP | Performed by: PHYSICAL THERAPIST

## 2022-02-26 PROCEDURE — 36415 COLL VENOUS BLD VENIPUNCTURE: CPT | Performed by: NURSE PRACTITIONER

## 2022-02-26 PROCEDURE — 250N000013 HC RX MED GY IP 250 OP 250 PS 637: Performed by: NURSE PRACTITIONER

## 2022-02-26 PROCEDURE — 97161 PT EVAL LOW COMPLEX 20 MIN: CPT | Mod: GP | Performed by: PHYSICAL THERAPIST

## 2022-02-26 PROCEDURE — 250N000013 HC RX MED GY IP 250 OP 250 PS 637: Performed by: PHYSICIAN ASSISTANT

## 2022-02-26 PROCEDURE — 250N000011 HC RX IP 250 OP 636: Performed by: NURSE PRACTITIONER

## 2022-02-26 PROCEDURE — 85018 HEMOGLOBIN: CPT | Performed by: NURSE PRACTITIONER

## 2022-02-26 PROCEDURE — 120N000001 HC R&B MED SURG/OB

## 2022-02-26 RX ORDER — METHOCARBAMOL 750 MG/1
750 TABLET, FILM COATED ORAL EVERY 6 HOURS
Status: DISCONTINUED | OUTPATIENT
Start: 2022-02-26 | End: 2022-03-02 | Stop reason: HOSPADM

## 2022-02-26 RX ORDER — ALPRAZOLAM 0.25 MG
1 TABLET ORAL 3 TIMES DAILY PRN
Status: CANCELLED | OUTPATIENT
Start: 2022-02-26

## 2022-02-26 RX ORDER — ALPRAZOLAM 0.25 MG
1 TABLET ORAL 3 TIMES DAILY PRN
Status: DISCONTINUED | OUTPATIENT
Start: 2022-02-26 | End: 2022-03-02 | Stop reason: HOSPADM

## 2022-02-26 RX ADMIN — CEFAZOLIN 1 G: 1 INJECTION, POWDER, FOR SOLUTION INTRAMUSCULAR; INTRAVENOUS at 04:52

## 2022-02-26 RX ADMIN — ESCITALOPRAM OXALATE 20 MG: 10 TABLET ORAL at 09:04

## 2022-02-26 RX ADMIN — OXYCODONE HYDROCHLORIDE 10 MG: 5 TABLET ORAL at 21:00

## 2022-02-26 RX ADMIN — ACETAMINOPHEN 975 MG: 325 TABLET, FILM COATED ORAL at 09:09

## 2022-02-26 RX ADMIN — OXYCODONE HYDROCHLORIDE 10 MG: 5 TABLET ORAL at 06:31

## 2022-02-26 RX ADMIN — SENNOSIDES AND DOCUSATE SODIUM 1 TABLET: 50; 8.6 TABLET ORAL at 21:01

## 2022-02-26 RX ADMIN — METHOCARBAMOL 750 MG: 750 TABLET ORAL at 20:23

## 2022-02-26 RX ADMIN — METHOCARBAMOL 750 MG: 750 TABLET ORAL at 01:16

## 2022-02-26 RX ADMIN — OXYCODONE HYDROCHLORIDE 10 MG: 5 TABLET ORAL at 10:57

## 2022-02-26 RX ADMIN — ACETAMINOPHEN 975 MG: 325 TABLET, FILM COATED ORAL at 01:16

## 2022-02-26 RX ADMIN — ACETAMINOPHEN 975 MG: 325 TABLET, FILM COATED ORAL at 17:00

## 2022-02-26 RX ADMIN — HYDROXYZINE HYDROCHLORIDE 25 MG: 25 TABLET ORAL at 06:31

## 2022-02-26 RX ADMIN — METHOCARBAMOL 750 MG: 750 TABLET ORAL at 13:18

## 2022-02-26 RX ADMIN — OXYCODONE HYDROCHLORIDE 10 MG: 5 TABLET ORAL at 02:42

## 2022-02-26 RX ADMIN — ALPRAZOLAM 1 MG: 0.25 TABLET ORAL at 17:07

## 2022-02-26 RX ADMIN — CEFAZOLIN 1 G: 1 INJECTION, POWDER, FOR SOLUTION INTRAMUSCULAR; INTRAVENOUS at 20:18

## 2022-02-26 RX ADMIN — SENNOSIDES AND DOCUSATE SODIUM 1 TABLET: 50; 8.6 TABLET ORAL at 09:04

## 2022-02-26 RX ADMIN — CEFAZOLIN 1 G: 1 INJECTION, POWDER, FOR SOLUTION INTRAMUSCULAR; INTRAVENOUS at 12:19

## 2022-02-26 RX ADMIN — METHOCARBAMOL 750 MG: 750 TABLET ORAL at 07:11

## 2022-02-26 RX ADMIN — OXYCODONE HYDROCHLORIDE 10 MG: 5 TABLET ORAL at 14:58

## 2022-02-26 RX ADMIN — SODIUM CHLORIDE: 9 INJECTION, SOLUTION INTRAVENOUS at 04:54

## 2022-02-26 ASSESSMENT — ACTIVITIES OF DAILY LIVING (ADL)
ADLS_ACUITY_SCORE: 12
ADLS_ACUITY_SCORE: 8
ADLS_ACUITY_SCORE: 12
ADLS_ACUITY_SCORE: 12
ADLS_ACUITY_SCORE: 8
ADLS_ACUITY_SCORE: 12
ADLS_ACUITY_SCORE: 8
ADLS_ACUITY_SCORE: 12
ADLS_ACUITY_SCORE: 10
ADLS_ACUITY_SCORE: 12
ADLS_ACUITY_SCORE: 8
ADLS_ACUITY_SCORE: 12

## 2022-02-26 NOTE — PLAN OF CARE
Goal Outcome Evaluation:    A/Ox4. VSS on RA. POD #1. Voiding per singer with good output. Unable to get OOB yet d/t constant pain. Refused therapy this AM with the above reason. Has constant pain on his back/incision that at times radiates to his hips and legs. Neurosurgery is aware of his uncontrolled pain, Robaxin was changed to scheduled for now and PRN Oxycodone was given Q4H and repositioned help as well. He refused cold pack when offered. Pt was rescheduled this PM. CMS intact except some numbness on his L hand. Dressing was changed this AM and CDI. JASMEET drain is intact with 15 mls output this shift. Good appetite.    Plan of Care Reviewed With: patient

## 2022-02-26 NOTE — PROGRESS NOTES
02/26/22 1531   Quick Adds   Type of Visit Initial PT Evaluation   Living Environment   People in Home spouse   Current Living Arrangements house   Home Accessibility stairs to enter home;stairs within home   Number of Stairs, Main Entrance other (see comments)  (13)   Stair Railings, Main Entrance railings on both sides of stairs   Number of Stairs, Within Home, Primary other (see comments)  (12)   Stair Railings, Within Home, Primary none   Transportation Anticipated family or friend will provide   Living Environment Comments Pt lives in a house with his wife, 13 stairs to enter and 12 stairs inside. Pt's wife can provide assist   Self-Care   Usual Activity Tolerance good   Current Activity Tolerance poor   Equipment Currently Used at Home none   Fall history within last six months no   Activity/Exercise/Self-Care Comment Pt IND at baseline w/ all mobility, did not use and AD and does not own one. Pt was driving prior to surgery   General Information   Onset of Illness/Injury or Date of Surgery 02/25/22   Referring Physician Demetri Stallings MD   Patient/Family Therapy Goals Statement (PT) Return home   Pertinent History of Current Problem (include personal factors and/or comorbidities that impact the POC) Pt is 55 y/o male POD # 1 following Lumbar 4 to Sacral 1 Extension Posterior Spinal Fusion with Lumbar 5 to Sacral 1 Hardware Removal   Existing Precautions/Restrictions spinal   General Observations Pt supine in bed upon therapist arrival, nurses providing dressing change   Cognition   Affect/Mental Status (Cognition) WNL   Orientation Status (Cognition) oriented x 3   Pain Assessment   Patient Currently in Pain   (10/10)   Integumentary/Edema   Integumentary/Edema Comments Incision not observed, covered by dressing. JASMEET drain intact.    Posture    Posture Comments Not observed   Range of Motion (ROM)   Range of Motion ROM deficits secondary to surgical procedure   Strength (Manual Muscle Testing)    Strength (Manual Muscle Testing) Deficits observed during functional mobility   Bed Mobility   Comment, (Bed Mobility) Roll from supine to sidelying SBA   Transfers   Comment, (Transfers) NT due to pain   Gait/Stairs (Locomotion)   Comment, (Gait/Stairs) NT due to pain   Balance   Balance Comments NT due to pain   Sensory Examination   Sensory Perception Comments numbness in L hand   Clinical Impression   Criteria for Skilled Therapeutic Intervention Yes, treatment indicated   PT Diagnosis (PT) Impaired functional mobility   Influenced by the following impairments Pain, decreased strength, decreased endurance   Functional limitations due to impairments Limited functional mobility requiring AD and assist   Clinical Presentation (PT Evaluation Complexity) Stable/Uncomplicated   Clinical Presentation Rationale Based on PMH, current status, and social support   Clinical Decision Making (Complexity) low complexity   Planned Therapy Interventions (PT) bed mobility training;gait training;stair training;transfer training   Anticipated Equipment Needs at Discharge (PT) walker, standard   Risk & Benefits of therapy have been explained patient   PT Discharge Planning   PT Discharge Recommendation (DC Rec)   (Will update discharge rec when OOB mobility is assessed)   PT Rationale for DC Rec Pt is below baseline. Pt currently SBA for rolling supine to sidelying. Pt severly limited by pain. Anticipate pt will require assist of 1 for transfers and gait.    PT Brief overview of current status SBA for rolling supine to sidelying   Plan of Care Review   Plan of Care Reviewed With patient   Total Evaluation Time   Total Evaluation Time (Minutes) 10   Physical Therapy Goals   PT Frequency 2x/day   PT Predicated Duration/Target Date for Goal Attainment 02/28/22   PT Goals Bed Mobility;Transfers;Gait;Stairs   PT: Bed Mobility Supervision/stand-by assist;Supine to/from sit;Within precautions   PT: Transfers Supervision/stand-by  assist;Sit to/from stand;Assistive device   PT: Gait Supervision/stand-by assist;Standard walker;50 feet   PT: Stairs Minimal assist;Greater than 10 stairs

## 2022-02-26 NOTE — PLAN OF CARE
Goal Outcome Evaluation:    Plan of Care Reviewed With: patient          Outcome Evaluation: PATIENT TOLERATED SURGERY WELL AND TRANSFERRED TO PACU IN STABLE CONDITION      Patient vital signs are at baseline: Yes, BPs sotf  Patient able to ambulate as they were prior to admission or with assist devices provided by therapies during their stay:  No,  Reason:  pt approached numerous times abt walking however uncontrolled pain hampered this from happening  Patient MUST void prior to discharge:  No,  Reason:  singer in place   Patient able to tolerate oral intake:  Yes, so far only clear liquids   Pain has adequate pain control using Oral analgesics:  No,  Reason:  pain pain consistently 7/10 or higher. Robaxin, Atarax, Tylenol and Oxycodone given for pain. MD notified.

## 2022-02-26 NOTE — PROGRESS NOTES
Paged regarding pain in ant thigh on right and incisional pain   Robaxin and tylenol give without relief of symptoms   Possible weakness RLE- of note, per Dr. Stallings surgical note patient had weakness in RLE pre operatively   No other changes on exam     Recommendations-  Oxycodone and dilaudid both available. Give PRN, ice to areas. Likely nerve pain from nerve irritation intraoperatively. If pain continues/worsen despite measures, we can obtain lumbar XR and consider gabapentin     All in agreement with plans

## 2022-02-26 NOTE — PROGRESS NOTES
[facility]    Neurosurgery  Daily Note    Assessment & Plan   Procedure(s):  Lumbar 4 to Sacral 1 Extension Posterior Spinal Fusion with Lumbar 5 to Sacral 1 Hardware Removal  Left Lumbar 1 to Lumbar 2 and Lumbar 3 to Lumbar 4 microdiscectomies   1 Day Post-Op  Increased pain overnight improved with oxy. Admits to ongoing pain in back and bilateral hips worsened with movement, improved with oxy. Denies new paresthesias, weakness, bowel/bladder changes. Has not been out of bed yet. Incision c/d/i. JASMEET intact with 90ml output since OR.     Plan:  -Advance activity as tolerated  -Continue supportive and symptomatic treatment  -Start or continue physical therapy  -Pain control measures. Keep oxy on a scheduled basis today   -Xanax added back in per patient request  -Advance diet as tolerated  -Routine wound care  -Keep JASMEET in and monitor output   -Plans reviewed with Dr. Hernandez  -Page or call with questions    Janice Pollock PA-C  [facility]  [address] [address]    Tel [phone]  Pager [phone]    Active Problems:    S/P lumbar spinal fusion      Janice Pollock    Interval History   Stable     Physical Exam   Temp: 97.6  F (36.4  C) Temp src: Oral BP: 110/48 Pulse: 76   Resp: 16 SpO2: 92 % O2 Device: None (Room air) Oxygen Delivery: 3 LPM  Vitals:    02/25/22 0656   Weight: 170 lb (77.1 kg)     Vital Signs with Ranges  Temp:  [97.3  F (36.3  C)-98.8  F (37.1  C)] 97.6  F (36.4  C)  Pulse:  [65-81] 76  Resp:  [10-22] 16  BP: ()/(48-78) 110/48  SpO2:  [92 %-100 %] 92 %  I/O last 3 completed shifts:  In: 2730 [P.O.:480; I.V.:2000]  Out: 1155 [Urine:1025; Drains:130]    Awake, alert, intact  Incision is dry. Dressing changed  JASMEET intact and secure  Strength in symmetric BLE   Sensation intact BLE   Negative clonus     Medications     sodium chloride 75 mL/hr at 02/26/22 0454        acetaminophen  975 mg Oral  Q8H     ceFAZolin  1 g Intravenous Q8H     escitalopram  20 mg Oral Daily     ketorolac  1 drop Right Eye 4x Daily     methocarbamol  750 mg Oral Q6H     polyethylene glycol  17 g Oral Daily     senna-docusate  1 tablet Oral BID     sodium chloride (PF)  3 mL Intracatheter Q8H       Plans discussed with Dr. Hernandez who was in agreement with plans    Janice DEXTER [facility]  [address] [place] [address]    Tel [phone]  Pager [phone]

## 2022-02-26 NOTE — PROVIDER NOTIFICATION
MD Notification medication requested.    Notified Person: MD Janice Pollock PA-C    Notified Person Name: Brenda PEREZ    Notification Date/Time: 2/26/22    Notification Interaction:    Purpose of Notification: pt requested Xanax     Orders Received: Xanax 1 mg oral 3 times daily PRN.    Comments:

## 2022-02-26 NOTE — PLAN OF CARE
Goal Outcome Evaluation:    Plan of Care Reviewed With: patient   Outcome Evaluation: PATIENT TOLERATED SURGERY WELL AND TRANSFERRED TO PACU IN STABLE CONDITION  Patient vital signs are at baseline: yes  Patient able to ambulate as they were prior to admission or with assist devices provided by therapies during their stay:  no  Patient MUST void prior to discharge:  yes  Patient able to tolerate oral intake:  yes  Pain has adequate pain control using Oral analgesics:  no  Alert and Oriented X4, VSS on room air. Pain controled with IV dilaudid, oycodone and ice applied on incision area, unable to ambulate due to back pain, has 400 mL urine output, and 50ml shai drain.  Pt has edema on left hand dose not have R thump, foely catheter an SHAI drain.

## 2022-02-26 NOTE — PROVIDER NOTIFICATION
MD Notification     Notified Person: MD     Notified Person Name: Janice Pollock     Notification Date/Time:     Notification Interaction: page      Purpose of Notification:  Pt c/o increased rt thigh pain. Robaxin & Tylenol given but still rates pain 10/10. Pain started prior to surgery but is reportedly increased. Tx. Jackelin S. *[id number]     Orders Received: Give 10 mg of oxycodone - if no resolution then may consider other treatment options      Comments: Agreed -- was planning on giving oxycodone next

## 2022-02-27 ENCOUNTER — APPOINTMENT (OUTPATIENT)
Dept: OCCUPATIONAL THERAPY | Facility: CLINIC | Age: 57
DRG: 454 | End: 2022-02-27
Attending: NEUROLOGICAL SURGERY
Payer: OTHER MISCELLANEOUS

## 2022-02-27 ENCOUNTER — APPOINTMENT (OUTPATIENT)
Dept: PHYSICAL THERAPY | Facility: CLINIC | Age: 57
DRG: 454 | End: 2022-02-27
Attending: NEUROLOGICAL SURGERY
Payer: OTHER MISCELLANEOUS

## 2022-02-27 LAB
GLUCOSE BLDC GLUCOMTR-MCNC: 83 MG/DL (ref 70–99)
HGB BLD-MCNC: 7.9 G/DL (ref 13.3–17.7)

## 2022-02-27 PROCEDURE — 120N000001 HC R&B MED SURG/OB

## 2022-02-27 PROCEDURE — 250N000013 HC RX MED GY IP 250 OP 250 PS 637: Performed by: PHYSICIAN ASSISTANT

## 2022-02-27 PROCEDURE — 36415 COLL VENOUS BLD VENIPUNCTURE: CPT | Performed by: NURSE PRACTITIONER

## 2022-02-27 PROCEDURE — 250N000011 HC RX IP 250 OP 636: Performed by: NURSE PRACTITIONER

## 2022-02-27 PROCEDURE — 85018 HEMOGLOBIN: CPT | Performed by: NURSE PRACTITIONER

## 2022-02-27 PROCEDURE — 97535 SELF CARE MNGMENT TRAINING: CPT | Mod: GO

## 2022-02-27 PROCEDURE — 97530 THERAPEUTIC ACTIVITIES: CPT | Mod: GO

## 2022-02-27 PROCEDURE — 250N000013 HC RX MED GY IP 250 OP 250 PS 637: Performed by: NURSE PRACTITIONER

## 2022-02-27 PROCEDURE — 97165 OT EVAL LOW COMPLEX 30 MIN: CPT | Mod: GO

## 2022-02-27 PROCEDURE — 97530 THERAPEUTIC ACTIVITIES: CPT | Mod: GP | Performed by: PHYSICAL THERAPIST

## 2022-02-27 RX ADMIN — OXYCODONE HYDROCHLORIDE 10 MG: 5 TABLET ORAL at 18:26

## 2022-02-27 RX ADMIN — METHOCARBAMOL 750 MG: 750 TABLET ORAL at 19:10

## 2022-02-27 RX ADMIN — ESCITALOPRAM OXALATE 20 MG: 10 TABLET ORAL at 09:24

## 2022-02-27 RX ADMIN — SENNOSIDES AND DOCUSATE SODIUM 1 TABLET: 50; 8.6 TABLET ORAL at 09:24

## 2022-02-27 RX ADMIN — HYDROMORPHONE HYDROCHLORIDE 0.2 MG: 0.2 INJECTION, SOLUTION INTRAMUSCULAR; INTRAVENOUS; SUBCUTANEOUS at 11:19

## 2022-02-27 RX ADMIN — ACETAMINOPHEN 975 MG: 325 TABLET, FILM COATED ORAL at 18:26

## 2022-02-27 RX ADMIN — ALPRAZOLAM 1 MG: 0.25 TABLET ORAL at 00:59

## 2022-02-27 RX ADMIN — CEFAZOLIN 1 G: 1 INJECTION, POWDER, FOR SOLUTION INTRAMUSCULAR; INTRAVENOUS at 05:12

## 2022-02-27 RX ADMIN — OXYCODONE HYDROCHLORIDE 10 MG: 5 TABLET ORAL at 05:12

## 2022-02-27 RX ADMIN — ALPRAZOLAM 1 MG: 0.25 TABLET ORAL at 16:24

## 2022-02-27 RX ADMIN — METHOCARBAMOL 750 MG: 750 TABLET ORAL at 13:26

## 2022-02-27 RX ADMIN — POLYETHYLENE GLYCOL 3350 17 G: 17 POWDER, FOR SOLUTION ORAL at 09:30

## 2022-02-27 RX ADMIN — OXYCODONE HYDROCHLORIDE 10 MG: 5 TABLET ORAL at 22:30

## 2022-02-27 RX ADMIN — ACETAMINOPHEN 975 MG: 325 TABLET, FILM COATED ORAL at 02:33

## 2022-02-27 RX ADMIN — METHOCARBAMOL 750 MG: 750 TABLET ORAL at 06:42

## 2022-02-27 RX ADMIN — OXYCODONE HYDROCHLORIDE 10 MG: 5 TABLET ORAL at 09:23

## 2022-02-27 RX ADMIN — CEFAZOLIN 1 G: 1 INJECTION, POWDER, FOR SOLUTION INTRAMUSCULAR; INTRAVENOUS at 11:25

## 2022-02-27 RX ADMIN — METHOCARBAMOL 750 MG: 750 TABLET ORAL at 02:33

## 2022-02-27 RX ADMIN — OXYCODONE HYDROCHLORIDE 10 MG: 5 TABLET ORAL at 13:27

## 2022-02-27 RX ADMIN — OXYCODONE HYDROCHLORIDE 10 MG: 5 TABLET ORAL at 00:59

## 2022-02-27 RX ADMIN — SENNOSIDES AND DOCUSATE SODIUM 1 TABLET: 50; 8.6 TABLET ORAL at 20:35

## 2022-02-27 RX ADMIN — CEFAZOLIN 1 G: 1 INJECTION, POWDER, FOR SOLUTION INTRAMUSCULAR; INTRAVENOUS at 19:10

## 2022-02-27 RX ADMIN — ACETAMINOPHEN 975 MG: 325 TABLET, FILM COATED ORAL at 09:24

## 2022-02-27 ASSESSMENT — ACTIVITIES OF DAILY LIVING (ADL)
ADLS_ACUITY_SCORE: 12
ADLS_ACUITY_SCORE: 13
ADLS_ACUITY_SCORE: 12
ADLS_ACUITY_SCORE: 13
ADLS_ACUITY_SCORE: 10
ADLS_ACUITY_SCORE: 13
ADLS_ACUITY_SCORE: 10
ADLS_ACUITY_SCORE: 13
ADLS_ACUITY_SCORE: 10
ADLS_ACUITY_SCORE: 13
ADLS_ACUITY_SCORE: 10
ADLS_ACUITY_SCORE: 10
ADLS_ACUITY_SCORE: 13
ADLS_ACUITY_SCORE: 12
ADLS_ACUITY_SCORE: 13
ADLS_ACUITY_SCORE: 13

## 2022-02-27 NOTE — PLAN OF CARE
Goal Outcome Evaluation:    Plan of Care Reviewed With: patient   Outcome Evaluation: PATIENT TOLERATED SURGERY WELL AND TRANSFERRED TO PACU IN STABLE CONDITION  Patient vital signs are at baseline: yes  Patient able to ambulate as they were prior to admission or with assist devices provided by therapies during their stay:  yes  Patient MUST void prior to discharge:  no, has catheter.   Patient able to tolerate oral intake:  yes  Pain has adequate pain control using Oral analgesics:  yes  Alert and Oriented X4, ambulate 200 feet in the hallway, pain controled with oxycodone. Pt also requested Xanax for anxiety and has now 3X Qday prn.   Will continue to monitor.

## 2022-02-27 NOTE — PROGRESS NOTES
"   02/27/22 0800   Quick Adds   Type of Visit Initial Occupational Therapy Evaluation   Living Environment   People in Home spouse   Current Living Arrangements house   Home Accessibility stairs to enter home;stairs within home   Number of Stairs, Main Entrance other (see comments)  (13)   Stair Railings, Main Entrance railings on both sides of stairs   Number of Stairs, Within Home, Primary other (see comments)  (12)   Stair Railings, Within Home, Primary none   Transportation Anticipated family or friend will provide   Self-Care   Usual Activity Tolerance good   Current Activity Tolerance poor   Equipment Currently Used at Home none   Fall history within last six months no   Activity/Exercise/Self-Care Comment Pt IND at baseline w/ all mobility, did not use and AD and does not own one. Pt reports he is very active at baseline    Instrumental Activities of Daily Living (IADL)   IADL Comments Pt does most of the home mgmt, cooking and cleaning reports he get excessive with cleaning; pt is an active individual does a lot of fishing and hunting in his free time. Pt drives at baseline prior to his surgery    General Information   Onset of Illness/Injury or Date of Surgery 02/25/22   Referring Physician Dalia Patel, NP   Patient/Family Therapy Goal Statement (OT) 'to go home'   Additional Occupational Profile Info/Pertinent History of Current Problem per chart review: \"Lumbar 4 to Sacral 1 Extension Posterior Spinal Fusion with Lumbar 5 to Sacral 1 Hardware Removal\"   Existing Precautions/Restrictions fall;no active hip ABD;spinal   Limitations/Impairments sensory  (pain)   Cognitive Status Examination   Orientation Status orientation to person, place and time   Cognitive Status Comments WFL   Sensory   Sensory Comments numbness in L hand, fingers, forearm and upto shoulder    Pain Assessment   Patient Currently in Pain Yes, see Vital Sign flowsheet   Integumentary/Edema   Integumentary/Edema Comments drain " and dressing intact, incision not observed; 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; increased swelling in B hands, more in L than R; reports numbness in L hand through arm and up to shoulder area (IV in this hand near wrist)    Posture   Posture forward head position;protracted shoulders   Posture Comments Pt required cuing for upright posture in standing    Range of Motion Comprehensive   Comment, General Range of Motion UE WFL   Strength Comprehensive (MMT)   Comment, General Manual Muscle Testing (MMT) Assessment UE strong    Coordination   Coordination Comments functionally intact   Bed Mobility   Comment (Bed Mobility) Pt moves at reduced pace and requires increased effort d/t s/p pain and discomfort. Pt prefers to be in sidelying position and remove pressure of incision site   Transfers   Transfer Comments SBA STS to FWW, use of bed rails to push up to standing, height of bed elevated   Balance   Balance Comments no balance deficits observed at  time of evalution   Clinical Impression   Criteria for Skilled Therapeutic Interventions Met (OT) Yes, treatment indicated   OT Diagnosis impaired ADL/IADL tasks   OT Problem List-Impairments impacting ADL problems related to;strength;sensation;mobility;post-surgical precautions;postural control;pain   Assessment of Occupational Performance 3-5 Performance Deficits   Identified Performance Deficits dressing, bathing, home mgmt, cooking, cleaning, activity tolerance, driving   Planned Therapy Interventions (OT) ADL retraining;neuromuscular re-education;strengthening;ROM;transfer training;home program guidelines;progressive activity/exercise   Clinical Decision Making Complexity (OT) low complexity   Risk & Benefits of therapy have been explained evaluation/treatment results reviewed;care plan/treatment goals reviewed;risks/benefits reviewed;current/potential barriers reviewed;participants voiced agreement with care plan;participants  included;patient   OT Discharge Planning   OT Discharge Recommendation (DC Rec) Transitional Care Facility;home with home care occupational therapy   OT Rationale for DC Rec Pt functioning significantly below baseline, ind at baseline currently Ax1 for functional mobility, requires assist for LB dsg and mgmt, poor pain mgmt at this time; pt would benefit from continued IP OT to progress ind w/ ADL tasks. Acknoweldge that pt would prefer to discharge home, however pt would benefit from TCU prior to returning home    Total Evaluation Time (Minutes)   Total Evaluation Time (Minutes) 10   OT Goals   Therapy Frequency (OT) Daily   OT Predicated Duration/Target Date for Goal Attainment 03/04/22   OT Goals Lower Body Dressing;Toilet Transfer/Toileting;OT Goal 1;Bed Mobility   OT: Lower Body Dressing Modified independent;using adaptive equipment;within precautions   OT: Bed Mobility Modified independent;Supervision/stand-by assist;supine to/from sitting;within precautions   OT: Toilet Transfer/Toileting Modified independent;toilet transfer;cleaning and garment management;within precautions   OT: Goal 1 Pt will verablize 3/3 spinal precautions and maintain 100% during therapy sessions

## 2022-02-27 NOTE — PLAN OF CARE
Goal Outcome Evaluation:    Plan of Care Reviewed With: patient     Patient vital signs are at baseline: Yes, VSS on RA -- soft BP at times   Patient able to ambulate as they were prior to admission or with assist devices provided by therapies during their stay:  Yes, assist of 1 w/ GB&W   Patient MUST void prior to discharge:  No,  Reason:  singer remains in place   Patient able to tolerate oral intake:  Yes, ate a sandwhich and snacks this shift  Pain has adequate pain control using Oral analgesics:  Yes        Outcome Evaluation: PATIENT TOLERATED SURGERY WELL AND TRANSFERRED TO PACU IN STABLE CONDITION

## 2022-02-27 NOTE — PROGRESS NOTES
[facility]    Neurosurgery  Daily Note    Assessment & Plan   Procedure(s):  Lumbar 4 to Sacral 1 Extension Posterior Spinal Fusion with Lumbar 5 to Sacral 1 Hardware Removal  Left Lumbar 1 to Lumbar 2 and Lumbar 3 to Lumbar 4 microdiscectomies   2 Days Post-Op   Still having intermittent sharp pains in back. Oxy and robaxin has been effective. Denies new paresthesias, weakness, bowel/bladder changes. Working with therapies and walked halls already this AM. JASMEET with 40 output overnight.      Plan:  -Advance activity as tolerated  -Continue supportive and symptomatic treatment  -Start or continue physical therapy  -Pain control measures.  -Xanax added back in 2/26 per patient request  -Advance diet as tolerated  -Routine wound care  -Keep JASMEET in and monitor output   -Plans reviewed with Dr. Hernandez  -Page or call with questions    Janice Pollock PA-C  [facility] Neurosurgery  [address] [address]    Tel [phone]  Pager [phone]    Active Problems:    S/P lumbar spinal fusion      Janice Pollock    Interval History   Stable     Physical Exam   Temp: 99.3  F (37.4  C) Temp src: Oral BP: (!) 95/5 Pulse: 80   Resp: 20 SpO2: 96 % O2 Device: None (Room air)    Vitals:    02/25/22 0656   Weight: 170 lb (77.1 kg)     Vital Signs with Ranges  Temp:  [98.5  F (36.9  C)-99.3  F (37.4  C)] 99.3  F (37.4  C)  Pulse:  [76-86] 80  Resp:  [15-20] 20  BP: ()/(5-74) 95/5  SpO2:  [94 %-97 %] 96 %  I/O last 3 completed shifts:  In: -   Out: 2070 [Urine:1950; Drains:120]    Awake, alert  No changes to incision or dressing   Moving BLE. Pain with left knee flexion and extension   Sensation intact BLE   Negative clonus       Medications     sodium chloride 75 mL/hr at 02/26/22 0454        acetaminophen  975 mg Oral Q8H     ceFAZolin  1 g Intravenous Q8H     escitalopram  20 mg Oral Daily     ketorolac  1 drop Right Eye 4x Daily      methocarbamol  750 mg Oral Q6H     polyethylene glycol  17 g Oral Daily     senna-docusate  1 tablet Oral BID     sodium chloride (PF)  3 mL Intracatheter Q8H       Plans discussed with Dr. Hernandez who was in agreement with plans    Janice DEXTER [facility] Neurosurgery  [address] [place] [address]    Tel [phone]  Pager [phone]

## 2022-02-28 ENCOUNTER — APPOINTMENT (OUTPATIENT)
Dept: PHYSICAL THERAPY | Facility: CLINIC | Age: 57
DRG: 454 | End: 2022-02-28
Attending: NEUROLOGICAL SURGERY
Payer: OTHER MISCELLANEOUS

## 2022-02-28 ENCOUNTER — TELEPHONE (OUTPATIENT)
Dept: NEUROSURGERY | Facility: CLINIC | Age: 57
End: 2022-02-28

## 2022-02-28 PROCEDURE — 250N000013 HC RX MED GY IP 250 OP 250 PS 637: Performed by: PHYSICIAN ASSISTANT

## 2022-02-28 PROCEDURE — 97530 THERAPEUTIC ACTIVITIES: CPT | Mod: GP | Performed by: PHYSICAL THERAPIST

## 2022-02-28 PROCEDURE — 120N000001 HC R&B MED SURG/OB

## 2022-02-28 PROCEDURE — 258N000003 HC RX IP 258 OP 636: Performed by: NURSE PRACTITIONER

## 2022-02-28 PROCEDURE — 250N000011 HC RX IP 250 OP 636: Performed by: NURSE PRACTITIONER

## 2022-02-28 PROCEDURE — 250N000013 HC RX MED GY IP 250 OP 250 PS 637: Performed by: NURSE PRACTITIONER

## 2022-02-28 RX ADMIN — OXYCODONE HYDROCHLORIDE 10 MG: 5 TABLET ORAL at 03:40

## 2022-02-28 RX ADMIN — CEFAZOLIN 1 G: 1 INJECTION, POWDER, FOR SOLUTION INTRAMUSCULAR; INTRAVENOUS at 03:36

## 2022-02-28 RX ADMIN — SENNOSIDES AND DOCUSATE SODIUM 1 TABLET: 50; 8.6 TABLET ORAL at 20:24

## 2022-02-28 RX ADMIN — ACETAMINOPHEN 975 MG: 325 TABLET, FILM COATED ORAL at 13:11

## 2022-02-28 RX ADMIN — POLYETHYLENE GLYCOL 3350 17 G: 17 POWDER, FOR SOLUTION ORAL at 09:55

## 2022-02-28 RX ADMIN — ACETAMINOPHEN 975 MG: 325 TABLET, FILM COATED ORAL at 00:29

## 2022-02-28 RX ADMIN — SENNOSIDES AND DOCUSATE SODIUM 1 TABLET: 50; 8.6 TABLET ORAL at 08:14

## 2022-02-28 RX ADMIN — CEFAZOLIN 1 G: 1 INJECTION, POWDER, FOR SOLUTION INTRAMUSCULAR; INTRAVENOUS at 19:30

## 2022-02-28 RX ADMIN — METHOCARBAMOL 750 MG: 750 TABLET ORAL at 00:29

## 2022-02-28 RX ADMIN — METHOCARBAMOL 750 MG: 750 TABLET ORAL at 08:12

## 2022-02-28 RX ADMIN — OXYCODONE HYDROCHLORIDE 10 MG: 5 TABLET ORAL at 13:27

## 2022-02-28 RX ADMIN — CEFAZOLIN 1 G: 1 INJECTION, POWDER, FOR SOLUTION INTRAMUSCULAR; INTRAVENOUS at 13:11

## 2022-02-28 RX ADMIN — ACETAMINOPHEN 650 MG: 325 TABLET, FILM COATED ORAL at 08:12

## 2022-02-28 RX ADMIN — ACETAMINOPHEN 650 MG: 325 TABLET, FILM COATED ORAL at 18:57

## 2022-02-28 RX ADMIN — ESCITALOPRAM OXALATE 20 MG: 10 TABLET ORAL at 08:14

## 2022-02-28 RX ADMIN — OXYCODONE HYDROCHLORIDE 10 MG: 5 TABLET ORAL at 08:12

## 2022-02-28 RX ADMIN — ALPRAZOLAM 1 MG: 0.25 TABLET ORAL at 00:25

## 2022-02-28 RX ADMIN — SODIUM CHLORIDE: 9 INJECTION, SOLUTION INTRAVENOUS at 13:10

## 2022-02-28 RX ADMIN — ALPRAZOLAM 1 MG: 0.25 TABLET ORAL at 08:18

## 2022-02-28 RX ADMIN — OXYCODONE HYDROCHLORIDE 10 MG: 5 TABLET ORAL at 18:57

## 2022-02-28 RX ADMIN — METHOCARBAMOL 750 MG: 750 TABLET ORAL at 13:12

## 2022-02-28 RX ADMIN — METHOCARBAMOL 750 MG: 750 TABLET ORAL at 18:57

## 2022-02-28 ASSESSMENT — ACTIVITIES OF DAILY LIVING (ADL)
ADLS_ACUITY_SCORE: 14
ADLS_ACUITY_SCORE: 12
ADLS_ACUITY_SCORE: 12
ADLS_ACUITY_SCORE: 14
ADLS_ACUITY_SCORE: 12
ADLS_ACUITY_SCORE: 12
ADLS_ACUITY_SCORE: 14
ADLS_ACUITY_SCORE: 14
ADLS_ACUITY_SCORE: 12
ADLS_ACUITY_SCORE: 12
ADLS_ACUITY_SCORE: 14
ADLS_ACUITY_SCORE: 12
ADLS_ACUITY_SCORE: 14
ADLS_ACUITY_SCORE: 12
ADLS_ACUITY_SCORE: 14
ADLS_ACUITY_SCORE: 12
ADLS_ACUITY_SCORE: 14
ADLS_ACUITY_SCORE: 12
ADLS_ACUITY_SCORE: 14
ADLS_ACUITY_SCORE: 12
ADLS_ACUITY_SCORE: 14
ADLS_ACUITY_SCORE: 12

## 2022-02-28 NOTE — PROGRESS NOTES
"[facility]    Neurosurgery Progress Note    Date of Service (when I saw the patient): 02/28/2022     Assessment & Plan     Procedure(s):  Lumbar 4 to Sacral 1 Extension Posterior Spinal Fusion with Lumbar 5 to Sacral 1 Hardware Removal  Left Lumbar 1 to Lumbar 2 and Lumbar 3 to Lumbar 4 microdiscectomies   -3 Days Post-Op  Reports back and left anterior thigh pain. Postural headache this AM as well. No paresthesias or weakness. Incision intact. JPx1 with 155 out overnight, thin.     Plan:  -JASMEET drain discontinued and stitched  -Flat bedrest today  -Continue pain management as needed  -Continue supportive and symptomatic treatments     JASMEET drain was removed. Drain site closed with suture.  Tip of drain intact upon removal.  Site appears clean without erythema, fluctuation, or induration.  Patient tolerated procedure without difficulty.    I have discussed the following assessment and plan Dr. Stallings who is in agreement with initial plan and will follow up with further consultation recommendations.    Dalia Patel, MANAS  [facility] Neurosurgery  [address]  [address] [place] [id number]    Tel [phone]  Pager [phone]      Interval History   Postural headache this AM.     Physical Exam   Temp: 99.1  F (37.3  C) Temp src: Oral BP: 117/71 Pulse: 81   Resp: 18 SpO2: 95 % O2 Device: None (Room air)    Vitals:    02/25/22 0656   Weight: 170 lb (77.1 kg)     Vital Signs with Ranges  Temp:  [97.8  F (36.6  C)-99.2  F (37.3  C)] 99.1  F (37.3  C)  Pulse:  [77-87] 81  Resp:  [18-20] 18  BP: (113-119)/(61-72) 117/71  SpO2:  [91 %-97 %] 95 %  I/O last 3 completed shifts:  In: 680 [P.O.:680]  Out: 2675 [Urine:2400; Drains:275]     , Blood pressure 117/71, pulse 81, temperature 99.1  F (37.3  C), temperature source Oral, resp. rate 18, height 5' 10\" (1.778 m), weight 170 lb (77.1 kg), SpO2 95 %.  170 lbs 0 oz  HEENT:  Normocephalic.  PERRLA.  "   Heart:  No peripheral edema  Lungs:  No SOB  Skin:  Warm and dry, good capillary refill. Incision CDI, JPx1.  Extremities:  Good radial and dorsalis pedis pulses bilaterally, no edema, cyanosis or clubbing.    NEUROLOGICAL EXAMINATION:   Mental status:  Alert and Oriented x 3, speech is fluent.  Motor:     Hip Flexor:                Right: 5/5  Left:  5/5  Hip Adductor:             Right:  5/5  Left:  5/5  Hip Abductor:             Right:  5/5  Left:  5/5  Gastroc Soleus:        Right:  5/5  Left:  5/5  Tib/Ant:                      Right:  5/5  Left:  5/5  EHL:                     Right:  5/5  Left:  5/5  Sensation:  intact    Medications     sodium chloride 75 mL/hr at 02/26/22 0454       acetaminophen  975 mg Oral Q8H     ceFAZolin  1 g Intravenous Q8H     escitalopram  20 mg Oral Daily     ketorolac  1 drop Right Eye 4x Daily     methocarbamol  750 mg Oral Q6H     polyethylene glycol  17 g Oral Daily     senna-docusate  1 tablet Oral BID     sodium chloride (PF)  3 mL Intracatheter Q8H       Data     CBC RESULTS:   Recent Labs   Lab Test 02/27/22  0654   HGB 7.9*     Basic Metabolic Panel:  No results found for: NA   No results found for: POTASSIUM  No results found for: CHLORIDE  No results found for: NAHOMI  No results found for: CO2  No results found for: BUN  No results found for: CR  Lab Results   Component Value Date    GLC 83 02/27/2022     INR:  No results found for: INR

## 2022-02-28 NOTE — TELEPHONE ENCOUNTER
Arianna from [facility] returned phone call. Spoke with medical records who has no documentation regarding the patient having Suboxone in chart or on med list.

## 2022-02-28 NOTE — PLAN OF CARE
Goal Outcome Evaluation:    Alert and oriented x4. VSS. Clear lung sounds bilaterally. On RA; denies SOB. Was encouraged to use his IS. Back incision dressing CDI. JASMEET drain is intact. Tolerating regular diet, denies nausea and vomiting. Active bowel sounds; admits passing gas. Lamar was removed at 1700, NA to bladder scan pt by 2000. Pain was managed with PRN oxycodone, dilaudid and scheduled robaxin. Assist of 1 with a GB and a walker.

## 2022-02-28 NOTE — PLAN OF CARE
Plan of Care Reviewed With:  Patient, understands, alert and oriented     Patient vital signs are at baseline: Yes  Patient able to ambulate as they were prior to admission or with assist devices provided by therapies during their stay:  stand by assist walker, CMS intact  Patient MUST void prior to discharge:  voiding without difficulty    Patient able to tolerate oral intake:  yes  Pain has adequate pain control using Oral analgesics:  Yes oxycodone/robaxin    Overall Patient Progress:  pain control/activity tolerance improving this shift    Outcome Evaluation:  to be determined with therapy evaluations to discharge home, see assessment checklists

## 2022-03-01 ENCOUNTER — APPOINTMENT (OUTPATIENT)
Dept: PHYSICAL THERAPY | Facility: CLINIC | Age: 57
DRG: 454 | End: 2022-03-01
Attending: NEUROLOGICAL SURGERY
Payer: OTHER MISCELLANEOUS

## 2022-03-01 ENCOUNTER — APPOINTMENT (OUTPATIENT)
Dept: OCCUPATIONAL THERAPY | Facility: CLINIC | Age: 57
DRG: 454 | End: 2022-03-01
Attending: NEUROLOGICAL SURGERY
Payer: OTHER MISCELLANEOUS

## 2022-03-01 PROCEDURE — 250N000011 HC RX IP 250 OP 636: Performed by: NURSE PRACTITIONER

## 2022-03-01 PROCEDURE — 97116 GAIT TRAINING THERAPY: CPT | Mod: GP | Performed by: PHYSICAL THERAPIST

## 2022-03-01 PROCEDURE — 250N000013 HC RX MED GY IP 250 OP 250 PS 637: Performed by: NURSE PRACTITIONER

## 2022-03-01 PROCEDURE — 97530 THERAPEUTIC ACTIVITIES: CPT | Mod: GP | Performed by: PHYSICAL THERAPIST

## 2022-03-01 PROCEDURE — 120N000001 HC R&B MED SURG/OB

## 2022-03-01 PROCEDURE — 97535 SELF CARE MNGMENT TRAINING: CPT | Mod: GO

## 2022-03-01 PROCEDURE — 250N000013 HC RX MED GY IP 250 OP 250 PS 637: Performed by: PHYSICIAN ASSISTANT

## 2022-03-01 RX ORDER — HYDROMORPHONE HYDROCHLORIDE 2 MG/1
2-4 TABLET ORAL
Status: DISCONTINUED | OUTPATIENT
Start: 2022-03-01 | End: 2022-03-02 | Stop reason: HOSPADM

## 2022-03-01 RX ADMIN — CEFAZOLIN 1 G: 1 INJECTION, POWDER, FOR SOLUTION INTRAMUSCULAR; INTRAVENOUS at 04:33

## 2022-03-01 RX ADMIN — ACETAMINOPHEN 650 MG: 325 TABLET, FILM COATED ORAL at 10:20

## 2022-03-01 RX ADMIN — METHOCARBAMOL 750 MG: 750 TABLET ORAL at 13:20

## 2022-03-01 RX ADMIN — SENNOSIDES AND DOCUSATE SODIUM 1 TABLET: 50; 8.6 TABLET ORAL at 08:55

## 2022-03-01 RX ADMIN — OXYCODONE HYDROCHLORIDE 10 MG: 5 TABLET ORAL at 06:30

## 2022-03-01 RX ADMIN — METHOCARBAMOL 750 MG: 750 TABLET ORAL at 01:01

## 2022-03-01 RX ADMIN — ALPRAZOLAM 1 MG: 0.25 TABLET ORAL at 20:01

## 2022-03-01 RX ADMIN — METHOCARBAMOL 750 MG: 750 TABLET ORAL at 06:31

## 2022-03-01 RX ADMIN — OXYCODONE HYDROCHLORIDE 10 MG: 5 TABLET ORAL at 01:01

## 2022-03-01 RX ADMIN — CEFAZOLIN 1 G: 1 INJECTION, POWDER, FOR SOLUTION INTRAMUSCULAR; INTRAVENOUS at 20:10

## 2022-03-01 RX ADMIN — OXYCODONE HYDROCHLORIDE 10 MG: 5 TABLET ORAL at 10:21

## 2022-03-01 RX ADMIN — ESCITALOPRAM OXALATE 20 MG: 10 TABLET ORAL at 08:55

## 2022-03-01 RX ADMIN — HYDROMORPHONE HYDROCHLORIDE 2 MG: 2 TABLET ORAL at 18:30

## 2022-03-01 RX ADMIN — CEFAZOLIN 1 G: 1 INJECTION, POWDER, FOR SOLUTION INTRAMUSCULAR; INTRAVENOUS at 13:20

## 2022-03-01 RX ADMIN — POLYETHYLENE GLYCOL 3350 17 G: 17 POWDER, FOR SOLUTION ORAL at 08:55

## 2022-03-01 RX ADMIN — HYDROMORPHONE HYDROCHLORIDE 4 MG: 2 TABLET ORAL at 14:00

## 2022-03-01 RX ADMIN — METHOCARBAMOL 750 MG: 750 TABLET ORAL at 21:57

## 2022-03-01 RX ADMIN — HYDROMORPHONE HYDROCHLORIDE 2 MG: 2 TABLET ORAL at 22:01

## 2022-03-01 ASSESSMENT — ACTIVITIES OF DAILY LIVING (ADL)
ADLS_ACUITY_SCORE: 13

## 2022-03-01 NOTE — PLAN OF CARE
Date/Time: 0700-1930    Ortho    Diagnosis: L4-S1 FUSION  POD#:3  Mental Status:alert and oriented X4 with a degree of anxiety.   Activity/dangle: Assist of one with gait belt and walker. PA ordered bedrest for a while today.   Diet:: regular diet  Pain:8/10. Takes Oxycodone and tylenol. Methocarbomol is also scheduled.   Lamar/Voiding:voids using urinal.   Tele/Restraints/Iso:none.   02/LDA: JASMEET drain d/moi and removed by PA. DRAIN SITE INTACT. The surgical site in the lower back is currently GAL. CDI. Staples remain in place.   D/C Date:TBD

## 2022-03-01 NOTE — PLAN OF CARE
Plan of Care Reviewed With:  patient understands, alert and oriented     Patient vital signs are at baseline: Yes  Patient able to ambulate as they were prior to admission or with assist devices provided by therapies during their stay:  independent with walker  Patient MUST void prior to discharge:  voiding without difficulty    Patient able to tolerate oral intake:  yes  Pain has adequate pain control using Oral analgesics:  oxycodone/tylenol/robaxin prior to increase in activity    Overall Patient Progress:  gradually improving with decrease in hand swelling, increase in activity, pain control, CMS intact    Outcome Evaluation:  discharge to home by tomorrow

## 2022-03-01 NOTE — PROGRESS NOTES
[facility]    Neurosurgery  Daily Post-Op Note    Assessment & Plan   Procedure(s):  Lumbar 4 to Sacral 1 Extension Posterior Spinal Fusion with Lumbar 5 to Sacral 1 Hardware Removal  Left Lumbar 1 to Lumbar 2 and Lumbar 3 to Lumbar 4 microdiscectomies   4 Days Post-Op  Tolerating physical therapy and rehabilitation well.  Requesting something different for po pain meds.     Plan:  -Advance activity as tolerated  -Continue supportive and symptomatic treatment  -Start or continue physical therapy  -will switch to dilaudid po.     Pasquale Hurst    Interval History   Stable.  Doing well.  Improving slowly.  Pain is reasonably controlled.  No fevers.     Physical Exam   Temp: 98.8  F (37.1  C) Temp src: Oral BP: 117/53 Pulse: 81   Resp: 16 SpO2: 90 % O2 Device: None (Room air)    Vitals:    02/25/22 0656   Weight: 77.1 kg (170 lb)     Vital Signs with Ranges  Temp:  [97  F (36.1  C)-99.8  F (37.7  C)] 98.8  F (37.1  C)  Pulse:  [77-85] 81  Resp:  [16-18] 16  BP: ()/(46-64) 117/53  SpO2:  [90 %-96 %] 90 %  I/O last 3 completed shifts:  In: 220 [P.O.:220]  Out: 1050 [Urine:1050]    Alert and oriented.  Moves all extremities equally.    Incision: CDI      Medications     sodium chloride Stopped (02/28/22 1922)        ceFAZolin  1 g Intravenous Q8H     escitalopram  20 mg Oral Daily     ketorolac  1 drop Right Eye 4x Daily     methocarbamol  750 mg Oral Q6H     polyethylene glycol  17 g Oral Daily     senna-docusate  1 tablet Oral BID     sodium chloride (PF)  3 mL Intracatheter Q8H           Pasquale Hurst PA-C  [facility] Neurosurgery  [address]  [address] [place] [address]    Tel [phone]  Pager [phone]

## 2022-03-02 ENCOUNTER — APPOINTMENT (OUTPATIENT)
Dept: PHYSICAL THERAPY | Facility: CLINIC | Age: 57
DRG: 454 | End: 2022-03-02
Attending: NEUROLOGICAL SURGERY
Payer: OTHER MISCELLANEOUS

## 2022-03-02 VITALS
HEART RATE: 72 BPM | HEIGHT: 70 IN | SYSTOLIC BLOOD PRESSURE: 125 MMHG | BODY MASS INDEX: 24.34 KG/M2 | WEIGHT: 170 LBS | DIASTOLIC BLOOD PRESSURE: 71 MMHG | TEMPERATURE: 98.7 F | OXYGEN SATURATION: 92 % | RESPIRATION RATE: 17 BRPM

## 2022-03-02 PROCEDURE — 250N000013 HC RX MED GY IP 250 OP 250 PS 637: Performed by: NURSE PRACTITIONER

## 2022-03-02 PROCEDURE — 250N000011 HC RX IP 250 OP 636: Performed by: NURSE PRACTITIONER

## 2022-03-02 PROCEDURE — 250N000013 HC RX MED GY IP 250 OP 250 PS 637: Performed by: PHYSICIAN ASSISTANT

## 2022-03-02 PROCEDURE — 97530 THERAPEUTIC ACTIVITIES: CPT | Mod: GP | Performed by: PHYSICAL THERAPIST

## 2022-03-02 RX ORDER — METHOCARBAMOL 750 MG/1
750 TABLET, FILM COATED ORAL 4 TIMES DAILY PRN
Qty: 40 TABLET | Refills: 0 | Status: SHIPPED | OUTPATIENT
Start: 2022-03-02 | End: 2022-03-21

## 2022-03-02 RX ORDER — AMOXICILLIN 250 MG
1-2 CAPSULE ORAL 2 TIMES DAILY PRN
Qty: 20 TABLET | Refills: 0 | Status: SHIPPED | OUTPATIENT
Start: 2022-03-02 | End: 2022-04-08

## 2022-03-02 RX ORDER — HYDROMORPHONE HYDROCHLORIDE 2 MG/1
2-4 TABLET ORAL EVERY 4 HOURS PRN
Qty: 40 TABLET | Refills: 0 | Status: SHIPPED | OUTPATIENT
Start: 2022-03-02 | End: 2022-03-10

## 2022-03-02 RX ADMIN — METHOCARBAMOL 750 MG: 750 TABLET ORAL at 08:09

## 2022-03-02 RX ADMIN — METHOCARBAMOL 750 MG: 750 TABLET ORAL at 14:08

## 2022-03-02 RX ADMIN — HYDROMORPHONE HYDROCHLORIDE 4 MG: 2 TABLET ORAL at 11:14

## 2022-03-02 RX ADMIN — CEFAZOLIN 1 G: 1 INJECTION, POWDER, FOR SOLUTION INTRAMUSCULAR; INTRAVENOUS at 04:05

## 2022-03-02 RX ADMIN — ALPRAZOLAM 1 MG: 0.25 TABLET ORAL at 01:13

## 2022-03-02 RX ADMIN — ESCITALOPRAM OXALATE 20 MG: 10 TABLET ORAL at 08:09

## 2022-03-02 RX ADMIN — CEFAZOLIN 1 G: 1 INJECTION, POWDER, FOR SOLUTION INTRAMUSCULAR; INTRAVENOUS at 11:20

## 2022-03-02 RX ADMIN — SENNOSIDES AND DOCUSATE SODIUM 1 TABLET: 50; 8.6 TABLET ORAL at 08:09

## 2022-03-02 RX ADMIN — METHOCARBAMOL 750 MG: 750 TABLET ORAL at 01:13

## 2022-03-02 RX ADMIN — HYDROMORPHONE HYDROCHLORIDE 4 MG: 2 TABLET ORAL at 01:13

## 2022-03-02 RX ADMIN — HYDROMORPHONE HYDROCHLORIDE 4 MG: 2 TABLET ORAL at 08:09

## 2022-03-02 RX ADMIN — POLYETHYLENE GLYCOL 3350 17 G: 17 POWDER, FOR SOLUTION ORAL at 08:10

## 2022-03-02 ASSESSMENT — ACTIVITIES OF DAILY LIVING (ADL)
ADLS_ACUITY_SCORE: 13

## 2022-03-02 NOTE — PROGRESS NOTES
SPIRITUAL HEALTH SERVICES  SPIRITUAL ASSESSMENT Progress Note  [facility]    REFERRAL SOURCE: -initiated visited to assess emotional and spiritual needs in light of length of hospital stay.      Introductory visit with Waqas, whose primary concern today was rest and pain. I wished him well, I oriented to  availability and how to request support during hospital stay.    I provided empathetic listening and emotional support.      PLAN: Spiritual Health Services remains available for patient, family, and staff support.     Please page the on call  by placing a STAT or ASAP Epic referral for Spiritual Health (which will roll to the on call pager).  '    NELSON López.   Associate   Pager [phone]

## 2022-03-02 NOTE — PLAN OF CARE
Physical Therapy Discharge Summary    Reason for therapy discharge:    Discharged to home.    Progress towards therapy goal(s). See goals on Care Plan in [facility] electronic health record for goal details.  Goals met    Therapy recommendation(s):    No further therapy is recommended.

## 2022-03-02 NOTE — DISCHARGE SUMMARY
[facility]    Discharge Summary   [facility] Neurosurgery    Date of Admission:  2/25/2022  Date of Discharge:  3/2/2022  Discharging Provider: Rhea Hyde  Date of Service (when I saw the patient): 03/02/22    Discharge Diagnoses   Active Problems:    S/P lumbar spinal fusion    History of Present Illness   Waqas Lechuga is an 56 year old male who presented with prior L5-S1 fusion, presented with severe back and leg pain with weakness.  Imaging showed L4-5 severe adjacent segment degeneration with stenosis and extruded left sided disc herniations at L1-L2 and L3-L4.  Underwent extensive non-operative management with failure to improve. Patient underwent L4-S1 extension posterior spinal fusion with L5-S1 hardware removal, left L1-2 and L3-4 microdiscectomies on 2/25/22 with Dr. Demetri Stallings. Today, patient reports low back pain managed with current pain medications. Pre op leg pain has improved. Incision CDI. Therapies evaluated and approve discharge to home with assist.      Hospital Course   Waqas Lechuga was admitted on 2/25/2022.  The following problems were addressed during his hospitalization:    Active Problems:    S/P lumbar spinal fusion    Acute Blood Loss Anemia, managed with IV fluids and serial Hemoglobin checks      Assessment:  L4-S1 extension posterior spinal fusion with L5-S1 hardware removal, left L1-2 and L3-4 microdiscectomies on 2/25/22     Plan:   - Routine post op care plan  - Discharge to home with assist   - Pain medications prescribed. Suboxone discontinued as patient has not taken this medication in over a year.   - Routine wound care. Suture/staple removal at 2 weeks post op  - Post op follow-up with NSG clinic as scheduled     I have discussed the following assessment and plan with Dr. Stallings who is in agreement with initial plan and will follow up with further consultation recommendations.    Rhea Hyde, CNP  [facility]  Neurosurgery  [facility]  [address]  [place] [address]  Tel [phone]  Pager [phone]    Code Status   Full Code    Primary Care Physician   NELLIE CENTENO    Physical Exam   Temp: 98.7  F (37.1  C) Temp src: Oral BP: 125/71 Pulse: 72   Resp: 17 SpO2: 92 % O2 Device: None (Room air)    Vitals:    02/25/22 0656   Weight: 170 lb (77.1 kg)     Vital Signs with Ranges  Temp:  [98.2  F (36.8  C)-99.5  F (37.5  C)] 98.7  F (37.1  C)  Pulse:  [71-78] 72  Resp:  [16-17] 17  BP: (112-125)/(62-71) 125/71  SpO2:  [92 %-97 %] 92 %  I/O last 3 completed shifts:  In: 1040 [P.O.:1040]  Out: 3085 [Urine:3085]    Constitutional: Awake, alert, cooperative, no apparent distress, and appears stated age.  Eyes: Lids and lashes normal, pupils equal, round and reactive to light, extra ocular muscles intact  ENT: Normocephalic, without obvious abnormality, atraumatic  Respiratory: No increased work of breathing  Skin: No bruising or bleeding, normal skin color, texture, turgor, no redness, warmth, or swelling.  Incision clean, dry, intact with staples.   Musculoskeletal: There is no redness, warmth, or swelling of the joints.  Full range of motion noted.  Motor strength is 5 out of 5 all extremities bilaterally.  Tone is normal.  Neurologic: Awake, alert, oriented to name, place and time.  Cranial nerves II-XII are grossly intact.  Motor is 5 out of 5 bilaterally.     Neuropsychiatric: Calm, normal eye contact, alert, normal affect, oriented to self, place, time and situation, memory for past and recent events intact and thought process normal.       Time Spent on this Encounter   Rhea GUIDRY NP, personally saw the patient today and spent less than or equal to 30 minutes discharging this patient.    Discharge Disposition   Discharged to home  Condition at discharge: Stable    Consultations This Hospital Stay   OCCUPATIONAL THERAPY ADULT IP CONSULT  PHYSICAL THERAPY ADULT IP  CONSULT    Discharge Orders      Reason for your hospital stay    Surgery     Follow-up and recommended labs and tests     Please follow up at [facility] in 2 weeks and 6 weeks.  Please call the clinic at [phone] for scheduling.     Activity    Discharge instructions:  Limit lifting to 10 pounds, no bending/twisting until follow up visit.    Ok to shower. Do not scrub or submerge incision under water until evaluated post-operatively in clinic.    Ok to walk as tolerated, avoid bed rest and prolonged sitting.    No contact sports until after follow up visit. No high impact activities such as running/jogging, snowmobile or 4 lyles riding or any other recreational vehicles. Avoid jostling/jarring activities.     Do not take NSAIDS (Ibuprofen, Advil, Aleve, Naproxen, etc) for pain control.    Do NOT drive while taking narcotic pain medication.    Call [facility] Neurosurgery at [phone] for increasing redness, swelling or pus draining from the incision, increased pain, or any other questions and concerns.     Wound care and dressings    Keep your incision clean and dry at all times.  Okay to remove dressing on post-op day 2.  Okay to shower on post-op day 3 and allow water to run over incision, pat dry after shower.  No bathing, swimming, or submerging in water.  Call immediately or go to the ER if any drainage occurs, or you develop new pain, redness, or swelling.     Walker Order for DME - ONLY FOR DME    DME Documentation:   Describe the reason for need to support medical necessity: Pain with gait.     I, the undersigned, certify that the above prescribed supplies are medically necessary for this patient and is both reasonable and necessary in reference to accepted standards of medical and necessary in reference to accepted standards of medical practice in the treatment of this patient's condition and is not prescribed as a convenience.     Diet    Follow this diet upon  discharge: Advance to a regular diet as tolerated     Discharge Medications   Current Discharge Medication List      START taking these medications    Details   HYDROmorphone (DILAUDID) 2 MG tablet Take 1-2 tablets (2-4 mg) by mouth every 4 hours as needed for pain  Qty: 40 tablet, Refills: 0    Associated Diagnoses: S/P lumbar spinal fusion      methocarbamol (ROBAXIN) 750 MG tablet Take 1 tablet (750 mg) by mouth 4 times daily as needed for muscle spasms  Qty: 40 tablet, Refills: 0    Associated Diagnoses: S/P lumbar spinal fusion      senna-docusate (SENOKOT-S/PERICOLACE) 8.6-50 MG tablet Take 1-2 tablets by mouth 2 times daily as needed for constipation  Qty: 20 tablet, Refills: 0    Associated Diagnoses: S/P lumbar spinal fusion         CONTINUE these medications which have NOT CHANGED    Details   ALPRAZolam (XANAX) 1 MG tablet Take 1 mg by mouth      escitalopram (LEXAPRO) 20 MG tablet Take 1 tablet by mouth daily      rizatriptan (MAXALT-MLT) 10 MG ODT Take 10 mg by mouth      terbinafine (LAMISIL) 250 MG tablet terbinafine HCl 250 mg tablet   TAKE ONE TABLET BY MOUTH ONCE DAILY         STOP taking these medications       buprenorphine-naloxone (SUBOXONE) 8-2 MG SUBL sublingual tablet Comments:   Reason for Stopping:         HYDROcodone-acetaminophen (NORCO) 5-325 MG tablet Comments:   Reason for Stopping:         tiZANidine (ZANAFLEX) 4 MG tablet Comments:   Reason for Stopping:             Allergies   Allergies   Allergen Reactions     Prochlorperazine Other (See Comments)     OHC Comment: Dystonic rxn; OHC Reaction: Other  Other reaction(s): *Unknown     Meperidine Rash     Trazodone Other (See Comments) and Unknown     Erection that lasted a long time  OHC Comment: Patient states he gets an erection. ; OHC Reaction: Unknown; OHC Reaction: 17; OHC Severity: Low; OHC Noted: [id number]  Erection that lasted a long time  Patient states he gets an erection.

## 2022-03-02 NOTE — PLAN OF CARE
Goal Outcome Evaluation:    Pt is A&Ox4. VSS. Voiding adequately with bedside urinal. SBA of 1. Pain managed with scheduled Robaxin and PRN Dilaudid. PRN Xanax given for anxiety. IV saline locked. Antibiotic Ancef scheduled [id number]. Edema on Left hand and wrist. Tingling present in Left hand/arm. Incision site is intact/open to air; no concerns noted. Discharge: TBD

## 2022-03-02 NOTE — PLAN OF CARE
Date/Time: 0700-1930    Trauma/Ortho/Medical: spinal fusion.    Diagnosis:spinal stenosis.   POD#:5  Mental Status:a and O x 4  Activity/dangle: up with walker independently during the day. Worked on climbing stairs with PT today.    Diet:: regular diet.   Pain:pain med changed to Dilaudid. 2mg -4mg prn q 4 hours. Continues to request pain medications frequently.   Lamar/Voiding:: voiding well using urinal   Tele/Restraints/Iso:none.   02/LDA: surgical incision is CDI on the mid to lower back. Staples remain intact. No drainage.   D/C Date: TBD.

## 2022-03-02 NOTE — PROGRESS NOTES
[facility]    Neurosurgery  Daily Progress Note    Assessment & Plan   Procedure(s):  Lumbar 4 to Sacral 1 Extension Posterior Spinal Fusion with Lumbar 5 to Sacral 1 Hardware Removal  Left Lumbar 1 to Lumbar 2 and Lumbar 3 to Lumbar 4 microdiscectomies   -5 Days Post-Op  Patient reports low back pain, managed with current pain medications. Pre op leg pain has improved. Incision CDI. Tolerating therapies. PT approves discharge to home with assist.     Plan:  - Discharge home later today  - Continue PT and pain control measures as needed  - Routine wound care  - Post op follow-up with NSG clinic as scheduled     Discussed with Dr. Chandrakant Hyde CNP  [facility] Neurosurgery  [address] [address]  Tel [phone]  Pager [phone]      Interval History   Stable     Physical Exam   Temp: 98.7  F (37.1  C) Temp src: Oral BP: 125/71 Pulse: 72   Resp: 17 SpO2: 92 % O2 Device: None (Room air)    Vitals:    02/25/22 0656   Weight: 170 lb (77.1 kg)     Vital Signs with Ranges  Temp:  [98.2  F (36.8  C)-99.5  F (37.5  C)] 98.7  F (37.1  C)  Pulse:  [71-79] 72  Resp:  [16-17] 17  BP: (112-125)/(57-71) 125/71  SpO2:  [92 %-97 %] 92 %  I/O last 3 completed shifts:  In: 1040 [P.O.:1040]  Out: 3085 [Urine:3085]    Mental status:  Alert and Oriented x 3, speech is fluent.  Motor:  Moves all extremities. Strength is 5/5 throughout BLE  Sensation:  Intact  Incision: CDI    Medications     sodium chloride Stopped (02/28/22 1922)        ceFAZolin  1 g Intravenous Q8H     escitalopram  20 mg Oral Daily     ketorolac  1 drop Right Eye 4x Daily     methocarbamol  750 mg Oral Q6H     polyethylene glycol  17 g Oral Daily     senna-docusate  1 tablet Oral BID     sodium chloride (PF)  3 mL Intracatheter Q8H       Rhea Hyde CNP  [facility] Neurosurgery   [address]  [facility]  [place] [address]  Tel [phone]  Pager [phone]

## 2022-03-03 NOTE — PLAN OF CARE
Occupational Therapy Discharge Summary    Reason for therapy discharge:    Discharged to home.    Progress towards therapy goal(s). See goals on Care Plan in [facility] electronic health record for goal details.  Goals met    Therapy recommendation(s):    No further therapy is recommended. Per chart/POC:Pt progressing in thearpy, would benefit from continued PT to address mobility, pt reports confidence w/ LB dsg skills and use of reacher to maintain precautions. Expected pt to be SBA-Min A/Mod I for dsg and toilet tr, assist for IADLs upon discharge.

## 2022-03-10 ENCOUNTER — TELEPHONE (OUTPATIENT)
Dept: NEUROSURGERY | Facility: CLINIC | Age: 57
End: 2022-03-10

## 2022-03-10 ENCOUNTER — OFFICE VISIT (OUTPATIENT)
Dept: NEUROSURGERY | Facility: CLINIC | Age: 57
End: 2022-03-10
Payer: OTHER MISCELLANEOUS

## 2022-03-10 VITALS
OXYGEN SATURATION: 99 % | WEIGHT: 165.3 LBS | TEMPERATURE: 98 F | BODY MASS INDEX: 23.72 KG/M2 | HEART RATE: 99 BPM | SYSTOLIC BLOOD PRESSURE: 122 MMHG | DIASTOLIC BLOOD PRESSURE: 64 MMHG | RESPIRATION RATE: 16 BRPM

## 2022-03-10 DIAGNOSIS — Z98.1 S/P LUMBAR SPINAL FUSION: ICD-10-CM

## 2022-03-10 PROCEDURE — 99207 PR NO CHARGE NURSE ONLY: CPT

## 2022-03-10 RX ORDER — HYDROMORPHONE HYDROCHLORIDE 2 MG/1
2-4 TABLET ORAL EVERY 4 HOURS PRN
Qty: 40 TABLET | Refills: 0 | Status: SHIPPED | OUTPATIENT
Start: 2022-03-10 | End: 2022-03-21

## 2022-03-10 ASSESSMENT — PAIN SCALES - GENERAL: PAINLEVEL: EXTREME PAIN (8)

## 2022-03-10 NOTE — TELEPHONE ENCOUNTER
Writer called the pharmacy who wanted to make sure our team was aware that he was on a benzo. Noted. Patient rarely takes the Xanax. I told him xanax can interact with dilaudid and shouldn't be taken together.  Anabell Wang RN on 3/10/2022 at 4:47 PM

## 2022-03-10 NOTE — TELEPHONE ENCOUNTER
Reason for Call:  Other call back    Detailed comments: Pharmacy called to discuss with Dalia or anyone on the neuro team about med interaction for pt./ Would like to talk to someone asap as pt needs meds today.     Phone Number Patient can be reached at: Other phone number:  [phone]    Best Time: any    Can we leave a detailed message on this number? Not Applicable    Call taken on 3/10/2022 at 4:14 PM by Cyn Torres

## 2022-03-10 NOTE — TELEPHONE ENCOUNTER
Reason for Call:  Other call back and prescription    Detailed comments: pt calling wanting to clarify where to send prescriptions and wondering if he is going to get them today. Please call pt back thank you    Phone Number Patient can be reached at: Home number on file [phone] (home)    Best Time: any    Can we leave a detailed message on this number? YES    Call taken on 3/10/2022 at 2:41 PM by Gauri Riggins

## 2022-03-10 NOTE — PROGRESS NOTES
Post-op Nurse Visit:    Patient presents today s/p L4-S1 extension PSF with L5-S1 HARDWARE REMOVAL, LEFT L1-2 AND 3-4 MICROSIDCECTOMIES on 2/25/22 per the order of Dr. Stallings.     Pain/Neuro Assessment  7/10 to low back described as sharp.   Numbness/tingling/strength: denies. Greatly improved compared to pre-op  Equal strength to bilateral lower extremities. Improving weakness.     Pain Relief Measures:  dilaudid: 1-2 tabs q4h.   Tylenol: 1000mg q4-6h  Robaxin: 1 tab 4 x per day  Ice: yes.     Incision   Incision inspected. Edges well-approximated. Moderate swelling noted surrounding incision. Largest area was the middle of incision, focused on the left. No redness, drainage, or warmth noted. staple(s) and sutures removed without difficulty. Steri-strips applied.    Dalia Patel DNP presented to the room to assess incision. Orders to continue standard post-op cares.    Activity  Following restrictions   Falls:  none  Patient is walking frequently with slight difficulty. Still using a walker. Patient is doing significantly better compared to preop when he couldn't walk. Patient reports that he walked a block yesterday and is experiencing increased pain today. I told him this was too much walking and should tailor back a little, then gradually increase again.    GI/  Patient's appetite is normal  Bowel/bladder problems? No  Taking stool softeners? No     Refills/x-rays/return to work  Refills given at this appointment? Yes  Sent for x-rays after this appointment? No  Ordered future x-rays? Yes  Return to work discussed at this appointment? Yes on disability    All of patient's questions addressed today. Patient was instructed to call with any additional questions/concerns.     Anaebll Wang RN on 3/10/2022 at 1:29 PM

## 2022-03-10 NOTE — PATIENT INSTRUCTIONS
Instructions for Patient    Steri-strips were applied today, they will fall off in 7-10 days. Do not to pull them off.     Keep your incision clean and dry at all times.     Showing is ok. No submerging incision under water such as baths, hot tubs, or swimming for the first 30 days and the incision is well healed.     Do not apply lotions or ointments to incision    No lifting greater than 10 pounds. No bending, twisting, or overhead reaching.    Walking: Walking is the best way to start exercise after surgery. Take short frequent walks. You may gradually increase the distance as tolerated. If you feel pain, decrease your activity, but DO NOT stop walking. Walking will help you gain strength, prevent muscle soreness and spasms, and prevent blood clots.     Weaning from narcotic pain medications    When it is time, start weaning by extending the time between doses.     For example, if you're taking 2 tabs every 4 hours, spread it out to 2 tabs over 4.5, 5, 6 hours.     At that point you can certainly cut down to 1 tab, then wean to an as needed basis until completely done with them.    Medication refills: call our clinic 2-3 business days before you are out of medication. A nurse will call you back to obtain a pain assessment.     Don't take more than 3000mg of Acetaminophen in 24 hours    Avoid Aspirin and NSAIDs(ex: ibuprofen/Advil) until given clearance (likely at the 6-week post-op appointment).    Encouraged icing for at least 1-2 times throughout the day for 20-30 minutes at a time. Avoid heat to the incision area.     Taking stool softeners regularly can reduce constipation commonly caused by narcotic pain medications .    Contact clinic right away if you develop:     Infection (redness, swelling, warmth, drainage, fever over 101 F)    New injury    Bladder or bowel changes or loss of control      Signs of blood clot:  o Swelling and/or warmth in one or both legs  o Pain or tenderness in your leg, ankle, foot,  or arm   o Red or discolored skin     Go to the Emergency Room     If sudden onset of severe headache, weakness, confusion, change in level of consciousness, pain, or loss of movement.    Chest pain     Trouble breathing     Post-operative appointments    Arrive 30 minutes before your 6 week post op, 3 months post op, 6 months post op, 1 year post-op appointments to allow time for an x-ray before each.   Anabell JONES RN  [facility]surgery [facility]  [place] [address] [place] [address]  Telephone:  [phone]   Fax:  [phone]

## 2022-03-10 NOTE — TELEPHONE ENCOUNTER
Writer called patient back. Informed him that the script was being signed now.    Anabell Wang RN on 3/10/2022 at 3:08 PM

## 2022-03-16 ENCOUNTER — TELEPHONE (OUTPATIENT)
Dept: NEUROSURGERY | Facility: CLINIC | Age: 57
End: 2022-03-16

## 2022-03-16 NOTE — TELEPHONE ENCOUNTER
Patient slipped on some ice, fell on this right hip. Wanted the medical team to be aware this happened.

## 2022-03-16 NOTE — TELEPHONE ENCOUNTER
Attempted to reach out to patient, no answer. Left voice message for patient to call clinic back to further discuss.     2/25/22  Lumbar 4 to Sacral 1 Extension Posterior Spinal Fusion with Lumbar 5 to Sacral 1 Hardware Removal with Dr Stallings.     Fall on his right hip

## 2022-03-21 ENCOUNTER — TELEPHONE (OUTPATIENT)
Dept: NEUROSURGERY | Facility: CLINIC | Age: 57
End: 2022-03-21

## 2022-03-21 DIAGNOSIS — Z98.1 S/P LUMBAR SPINAL FUSION: ICD-10-CM

## 2022-03-21 RX ORDER — HYDROMORPHONE HYDROCHLORIDE 2 MG/1
2-4 TABLET ORAL EVERY 4 HOURS PRN
Qty: 40 TABLET | Refills: 0 | Status: SHIPPED | OUTPATIENT
Start: 2022-03-21 | End: 2022-04-08

## 2022-03-21 RX ORDER — METHOCARBAMOL 750 MG/1
750 TABLET, FILM COATED ORAL 4 TIMES DAILY PRN
Qty: 40 TABLET | Refills: 0 | Status: SHIPPED | OUTPATIENT
Start: 2022-03-21 | End: 2022-04-19

## 2022-03-21 NOTE — TELEPHONE ENCOUNTER
Patient took a spill on the ice and is currently having pain post surgery. He said he has been taking more pain medication than normal and would like a refill on his two medications - Dilaudid and Robaxin. Please call him back at [phone]. Thank you~
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
## 2022-03-22 NOTE — TELEPHONE ENCOUNTER
As per Pasquale DACOSTA, please get a Lumbar xray.   Order placed.   Attempted to reach out to patient, no answer. Left voice message for patient to call clinic back to further discuss.

## 2022-03-24 ENCOUNTER — TELEPHONE (OUTPATIENT)
Dept: NEUROSURGERY | Facility: CLINIC | Age: 57
End: 2022-03-24

## 2022-03-24 DIAGNOSIS — Z98.1 S/P LUMBAR SPINAL FUSION: ICD-10-CM

## 2022-03-24 NOTE — TELEPHONE ENCOUNTER
Patient is asking for a call back regarding the muscle relaxer medication, he said his insurance does not cover Methocarbamol and he is wondering if he can get something that is less costly. Please call him back at [phone]. Thank you~

## 2022-03-30 ENCOUNTER — TELEPHONE (OUTPATIENT)
Dept: NEUROSURGERY | Facility: CLINIC | Age: 57
End: 2022-03-30

## 2022-03-30 NOTE — TELEPHONE ENCOUNTER
Patient calling for a refill of Dilaudid     Attempted to reach out to patient times 2, no answer. Left voice message for patient to call clinic back to further discuss.

## 2022-04-08 ENCOUNTER — OFFICE VISIT (OUTPATIENT)
Dept: NEUROSURGERY | Facility: CLINIC | Age: 57
End: 2022-04-08
Payer: OTHER MISCELLANEOUS

## 2022-04-08 ENCOUNTER — ANCILLARY PROCEDURE (OUTPATIENT)
Dept: GENERAL RADIOLOGY | Facility: CLINIC | Age: 57
End: 2022-04-08
Attending: NURSE PRACTITIONER
Payer: OTHER MISCELLANEOUS

## 2022-04-08 VITALS
BODY MASS INDEX: 23.68 KG/M2 | SYSTOLIC BLOOD PRESSURE: 130 MMHG | DIASTOLIC BLOOD PRESSURE: 84 MMHG | TEMPERATURE: 97.5 F | WEIGHT: 165 LBS

## 2022-04-08 DIAGNOSIS — Z98.1 S/P LUMBAR SPINAL FUSION: Primary | ICD-10-CM

## 2022-04-08 DIAGNOSIS — Z98.1 S/P LUMBAR SPINAL FUSION: ICD-10-CM

## 2022-04-08 PROCEDURE — 72100 X-RAY EXAM L-S SPINE 2/3 VWS: CPT | Mod: TC | Performed by: RADIOLOGY

## 2022-04-08 PROCEDURE — 99024 POSTOP FOLLOW-UP VISIT: CPT | Performed by: PHYSICIAN ASSISTANT

## 2022-04-08 ASSESSMENT — PAIN SCALES - GENERAL: PAINLEVEL: MILD PAIN (3)
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
## 2022-04-08 NOTE — LETTER
[facility]UROSURG[facility]IC [address] [address]  Phone: [phone]  Fax: [phone]    04/08/22    Waqas Lechuga  [facility] [facility] [address]      To whom it may concern:     Waqas Lechuga does not have a physical neurological deficit, such as dorsiflexion or plantarflexion weakness, also known as dropfoot, that would prevent him from safely operating a motor vehicle.       Thank You.      Pasquale Hurst PA-C  [facility] Neurosurgery
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
## 2022-04-15 ENCOUNTER — TELEPHONE (OUTPATIENT)
Dept: NEUROSURGERY | Facility: CLINIC | Age: 57
End: 2022-04-15

## 2022-04-15 NOTE — TELEPHONE ENCOUNTER
Patient calling for a refill of      DOS: 2/25/22  Procedure:   Patient presents today s/p L4-S1 extension PSF with L5-S1 HARDWARE REMOVAL, LEFT L1-2 AND 3-4 MICROSIDCECTOMIES    Surgeon: Dr Stallings  Weeks Post Op: 7    Attempted to reach out to patient, no answer. Left voice message for patient to call clinic back to further discuss.

## 2022-04-18 DIAGNOSIS — Z98.1 S/P LUMBAR SPINAL FUSION: ICD-10-CM

## 2022-04-18 NOTE — TELEPHONE ENCOUNTER
Patient returning call from Dr. Stallings regarding pain medication request. Please reach back out to patient to follow up on request.   Reports pain at fusion site, rates pain 5-9/10.   Requesting Oxycodone 5mg. He has been using 2 tablets every 4 hours. Also states he needs refill of Robaxin.     *Please route to your team to address.     Jasmine Da Silva RN on 4/18/2022 at 4:37 PM  [facility] Nurse

## 2022-04-19 RX ORDER — METHOCARBAMOL 750 MG/1
750 TABLET, FILM COATED ORAL 4 TIMES DAILY PRN
Qty: 40 TABLET | Refills: 0 | Status: SHIPPED | OUTPATIENT
Start: 2022-04-19 | End: 2023-05-09

## 2022-04-19 RX ORDER — OXYCODONE HYDROCHLORIDE 5 MG/1
5-10 TABLET ORAL EVERY 4 HOURS PRN
Qty: 40 TABLET | Refills: 0 | Status: SHIPPED | OUTPATIENT
Start: 2022-04-19 | End: 2022-10-21

## 2022-04-19 NOTE — TELEPHONE ENCOUNTER
Patient calling for a refill of Oxycodone and Robaxin.     DOS: 2/25/22  Procedure:   Patient presents today s/p L4-S1 extension PSF with L5-S1 HARDWARE REMOVAL, LEFT L1-2 AND 3-4 MICROSIDCECTOMIES    Surgeon: Dr Stallings  Weeks Post Op: 7    Current symptom(s):  Low back pain 6-8/10. Improvement in numbness and tingling  Current pain management: 1-2 tabs every 4 hours, robaxin 1 tab 2-3 times a day    Discussed starting to wean    Last fill: 3/30/22  Next visit: 6/3/22    Medication pended for your approval, if appropriate. Pharmacy verified.     Any patient questions or concerns: no    Informed patient request will be forwarded to care team.

## 2022-04-27 ENCOUNTER — TELEPHONE (OUTPATIENT)
Dept: NEUROSURGERY | Facility: OTHER | Age: 57
End: 2022-04-27
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
## 2022-04-28 ENCOUNTER — TELEPHONE (OUTPATIENT)
Dept: NEUROSURGERY | Facility: CLINIC | Age: 57
End: 2022-04-28

## 2022-04-28 NOTE — TELEPHONE ENCOUNTER
Pt left a VM on RN line -     Message starting that the letter needs to be sent to: [facility] ATTN: Saw fax number [phone]    Called patient back. He states he was able to print off the letter and give to the DMV. He does NOT need us to fax anything.

## 2022-06-03 ENCOUNTER — HOSPITAL ENCOUNTER (OUTPATIENT)
Dept: GENERAL RADIOLOGY | Facility: CLINIC | Age: 57
Discharge: HOME | End: 2022-06-03
Attending: PHYSICIAN ASSISTANT | Admitting: PHYSICIAN ASSISTANT
Payer: OTHER MISCELLANEOUS

## 2022-06-03 ENCOUNTER — OFFICE VISIT (OUTPATIENT)
Dept: NEUROSURGERY | Facility: CLINIC | Age: 57
End: 2022-06-03
Payer: OTHER MISCELLANEOUS

## 2022-06-03 VITALS
SYSTOLIC BLOOD PRESSURE: 144 MMHG | TEMPERATURE: 98.1 F | HEIGHT: 70 IN | DIASTOLIC BLOOD PRESSURE: 84 MMHG | WEIGHT: 160 LBS | BODY MASS INDEX: 22.9 KG/M2

## 2022-06-03 DIAGNOSIS — Z98.1 S/P LUMBAR SPINAL FUSION: Primary | ICD-10-CM

## 2022-06-03 DIAGNOSIS — Z98.1 S/P LUMBAR SPINAL FUSION: ICD-10-CM

## 2022-06-03 PROCEDURE — 72100 X-RAY EXAM L-S SPINE 2/3 VWS: CPT

## 2022-06-03 PROCEDURE — 99024 POSTOP FOLLOW-UP VISIT: CPT | Performed by: PHYSICIAN ASSISTANT

## 2022-06-03 RX ORDER — TIZANIDINE 2 MG/1
2-4 TABLET ORAL 3 TIMES DAILY
Qty: 60 TABLET | Refills: 1 | Status: SHIPPED | OUTPATIENT
Start: 2022-06-03 | End: 2022-10-28

## 2022-06-03 ASSESSMENT — PAIN SCALES - GENERAL: PAINLEVEL: MODERATE PAIN (4)

## 2022-06-03 NOTE — LETTER
6/3/2022         RE: Waqas Lechuga  [address]  [facility] [address]        Dear Colleague,    Thank you for referring your patient, Waqas Lechuga, to the [facility] NEUROSURGERY CLINIC [place]. Please see a copy of my visit note below.    Neurosurgery Clinic  Neurosurgery followup:    HPI: 3 months out from hardware removal at L5-S1, extension of TLIF to L4-5, and MIS L1-2 and L3-4 microdiscectomies.  He is doing well.  He thinks perhaps he has been doing a little too much physical activity lately with an increase in some of his low back pain.      Exam:  Constitutional:  Alert, well nourished, NAD.  HEENT: Normocephalic, atraumatic.   Pulm:  Without shortness of breath   CV:  No pitting edema of BLE.      Neurological:  Awake  Alert  Oriented x 3  Motor exam:        IP Q DF PF EHL  R   5  5   5   5    5  L   5  5   5   5    5     Reflexes are 2+ in the patellar and Achilles. There is no clonus. Downgoing Babinski.      Able to spontaneously move L/E bilaterally  Sensation intact throughout all L/E dermatomes     Incision: Well-healed    Imaging: AP and lateral views show stable lumbar hardware    A/P:    3-month status post hardware removal at L5-S1, extension of TLIF to L4-5, and MIS L1-2 and L3-4 discectomies.    He is doing well.  He understands x-rays are stable.  He will continue to gradually increase activity.  We will see him back at 6 months for repeat x-rays.  He voiced agreement and understanding.          Pasquale Hurst PA-C  [facility] Neurosurgery  [address] [address]    Tel [phone]  Pager [phone]      The use of Dragon/[employer] services may have been used to construct the content in this note; any grammatical or spelling errors are non-intentional. Please contact the author of this note directly if you are in need of any clarification.        Again, thank you for allowing me to  participate in the care of your patient.        Sincerely,        Pasquale Hurst PA-C

## 2022-06-03 NOTE — PROGRESS NOTES
Neurosurgery Clinic  Neurosurgery followup:    HPI: 3 months out from hardware removal at L5-S1, extension of TLIF to L4-5, and MIS L1-2 and L3-4 microdiscectomies.  He is doing well.  He thinks perhaps he has been doing a little too much physical activity lately with an increase in some of his low back pain.      Exam:  Constitutional:  Alert, well nourished, NAD.  HEENT: Normocephalic, atraumatic.   Pulm:  Without shortness of breath   CV:  No pitting edema of BLE.      Neurological:  Awake  Alert  Oriented x 3  Motor exam:        IP Q DF PF EHL  R   5  5   5   5    5  L   5  5   5   5    5     Reflexes are 2+ in the patellar and Achilles. There is no clonus. Downgoing Babinski.      Able to spontaneously move L/E bilaterally  Sensation intact throughout all L/E dermatomes     Incision: Well-healed    Imaging: AP and lateral views show stable lumbar hardware    A/P:    3-month status post hardware removal at L5-S1, extension of TLIF to L4-5, and MIS L1-2 and L3-4 discectomies.    He is doing well.  He understands x-rays are stable.  He will continue to gradually increase activity.  We will see him back at 6 months for repeat x-rays.  He voiced agreement and understanding.          Pasquale Hurst PA-C  [facility] Neurosurgery  [address] [address]    Tel [phone]  Pager [phone]      The use of Dragon/[employer] dictation services may have been used to construct the content in this note; any grammatical or spelling errors are non-intentional. Please contact the author of this note directly if you are in need of any clarification.

## 2022-08-03 ENCOUNTER — MYC REFILL (OUTPATIENT)
Dept: FAMILY MEDICINE | Facility: OTHER | Age: 57
End: 2022-08-03

## 2022-08-03 DIAGNOSIS — Z98.1 S/P LUMBAR SPINAL FUSION: ICD-10-CM

## 2022-08-03 RX ORDER — OXYCODONE HYDROCHLORIDE 5 MG/1
5-10 TABLET ORAL EVERY 4 HOURS PRN
Qty: 40 TABLET | Refills: 0 | Status: CANCELLED | OUTPATIENT
Start: 2022-08-03

## 2022-08-03 NOTE — TELEPHONE ENCOUNTER
Patient sent in message requesting oxycodone refill, we have not filled him since 4/19/22 -    Pt is s/p Lumbar 4 to Sacral 1 Extension Posterior Spinal Fusion with Lumbar 5 to Sacral 1 Hardware Removal, Left Lumbar 1 to Lumbar 2 and Lumbar 3 to Lumbar 4 microdiscectomies on 2/25/22 with Dr. Stallings    Educated patient that our office is typically unable to provide pain medication prior to patients having surgery or if they are outside of the acute post op phase window, as we are a surgery specific clinic. Any pain medications will have to come from primary care provider.     Recommend that pt utilize up to 3,000 mg/day of tylenol, NSAIDs (ibuprofen, aleve, advil), ice and heat. If having severe pain despite these recommendations, will need to present to an ER for evaluation. If having concerns, may set up an appt with one of our APPs in clinic.

## 2022-10-10 ENCOUNTER — HEALTH MAINTENANCE LETTER (OUTPATIENT)
Age: 57
End: 2022-10-10

## 2022-10-18 ENCOUNTER — TELEPHONE (OUTPATIENT)
Dept: NEUROSURGERY | Facility: OTHER | Age: 57
End: 2022-10-18

## 2022-10-18 ENCOUNTER — MYC MEDICAL ADVICE (OUTPATIENT)
Dept: NEUROSURGERY | Facility: CLINIC | Age: 57
End: 2022-10-18

## 2022-10-18 DIAGNOSIS — Z98.1 S/P LUMBAR SPINAL FUSION: Primary | ICD-10-CM

## 2022-10-18 NOTE — TELEPHONE ENCOUNTER
Patient was deer hunting and now has the feeling pins and needles in his legs. Patient concerned if there is something he should be doing to alleviate the pain

## 2022-10-19 NOTE — TELEPHONE ENCOUNTER
Per PRANEETH French plan to have pt return to clinic for follow-up appointment with xrays prior. Order entered. Sent mychart to pt to update on plan of care.

## 2022-10-19 NOTE — TELEPHONE ENCOUNTER
Last Visit: 6/3/22; 3 mo po Office visit with Miguel Hurst    S/P: 2/25/22 Lumbar 4 to Sacral 1 Extension Posterior Spinal Fusion with Lumbar 5 to Sacral 1 Hardware Removal; Left Lumbar 1 to Lumbar 2 and Lumbar 3 to Lumbar 4 microdiscectomies    Next Visit: NA (Needs 6 mo follow-up with xrays)    Name of Provider:  Dr. Stallings     Assessment: Pt writes in via [employer] with new symptoms after increased activity (see mychart encounter). Pt did not have his 6 mo follow-up with xrays requested by Pasquale Hurst per last office visit note.     Attempted to call pt to discuss symptoms. No answer, left VM to call back and discuss.     Recommendation given: Call office to make a follow-up appt with SHIREEN with xrays prior.     Action needed from provider: Message routed to SHIREEN.

## 2022-10-19 NOTE — TELEPHONE ENCOUNTER
DOS: 2/25/22  Surgery: L4-S1 extension posterior spinal fusion with L5-S1 hardware removal, left L1-2 and L3-4 microdiscectomies  Surgeon: Dr Stallings    Last Visit: 6/3/22    Next Visit: Kj Rivera to be seen 6 months from 3 month visit with xrays    Assessment: patient sends mychart that he has pins/needes in legs after hunting.    Recommendation given: mychart sent for more info    Action needed from provider: laura

## 2022-10-20 ENCOUNTER — MYC REFILL (OUTPATIENT)
Dept: FAMILY MEDICINE | Facility: OTHER | Age: 57
End: 2022-10-20

## 2022-10-20 ENCOUNTER — TELEPHONE (OUTPATIENT)
Dept: NEUROSURGERY | Facility: CLINIC | Age: 57
End: 2022-10-20

## 2022-10-20 DIAGNOSIS — M54.16 LUMBAR RADICULOPATHY: Primary | ICD-10-CM

## 2022-10-20 DIAGNOSIS — Z98.1 S/P LUMBAR SPINAL FUSION: ICD-10-CM

## 2022-10-20 NOTE — TELEPHONE ENCOUNTER
Pt called said he needs a muscle relaxer as he is having some intense pain once in awhile. Has been laying around the house since 10/8/22.  He says he is taking ibuprofen & it isn't helping.  Please call if need be.  Thank you.

## 2022-10-21 RX ORDER — OXYCODONE HYDROCHLORIDE 5 MG/1
5-10 TABLET ORAL EVERY 4 HOURS PRN
Qty: 40 TABLET | Refills: 0 | Status: CANCELLED | OUTPATIENT
Start: 2022-10-21

## 2022-10-21 RX ORDER — OXYCODONE HYDROCHLORIDE 5 MG/1
5 TABLET ORAL EVERY 6 HOURS PRN
Qty: 12 TABLET | Refills: 0 | Status: SHIPPED | OUTPATIENT
Start: 2022-10-21 | End: 2023-04-18

## 2022-10-21 NOTE — TELEPHONE ENCOUNTER
Patient calling for a refill of Oxycodone    DOS: 2/25/22  Procedure: L4-S1 extension posterior spinal fusion with L5-S1 hardware removal, left L1-2 and L3-4 microdiscectomies  Surgeon: Dr Stallings      Current symptom(s): Moderate to severe back pain and left leg pain uncontrolled with OTC meds. Describes as aching and cramping. Worse with activity. Also left leg numbness from hip to ankle but worse in thigh. No weakness, no bowel or bladder issues.   Requesting a few pain pills/muscle relaxers to get him to appointment scheduled on 10/28/22.    Current pain management: Tylenol, Ibuprofen, Ice, Heat    Last fill: Oxycodone 4/19/22, Zanaflex 6/3/22  Next visit: 10/28/22    Medication pended for your approval, if appropriate. Pharmacy verified.     Any patient questions or concerns:     Informed patient request will be forwarded to care team.

## 2022-10-28 ENCOUNTER — OFFICE VISIT (OUTPATIENT)
Dept: NEUROSURGERY | Facility: CLINIC | Age: 57
End: 2022-10-28
Payer: OTHER MISCELLANEOUS

## 2022-10-28 ENCOUNTER — ANCILLARY PROCEDURE (OUTPATIENT)
Dept: GENERAL RADIOLOGY | Facility: CLINIC | Age: 57
End: 2022-10-28
Attending: PHYSICIAN ASSISTANT
Payer: OTHER MISCELLANEOUS

## 2022-10-28 DIAGNOSIS — Z98.1 S/P LUMBAR SPINAL FUSION: ICD-10-CM

## 2022-10-28 PROCEDURE — 72100 X-RAY EXAM L-S SPINE 2/3 VWS: CPT | Mod: TC | Performed by: RADIOLOGY

## 2022-10-28 PROCEDURE — 99214 OFFICE O/P EST MOD 30 MIN: CPT | Performed by: PHYSICIAN ASSISTANT

## 2022-10-28 RX ORDER — METHYLPREDNISOLONE 4 MG
TABLET, DOSE PACK ORAL
Qty: 21 TABLET | Refills: 0 | Status: SHIPPED | OUTPATIENT
Start: 2022-10-28 | End: 2022-12-23

## 2022-10-28 RX ORDER — TIZANIDINE 2 MG/1
2-4 TABLET ORAL 3 TIMES DAILY
Qty: 60 TABLET | Refills: 1 | Status: SHIPPED | OUTPATIENT
Start: 2022-10-28 | End: 2022-12-23

## 2022-10-28 NOTE — PROGRESS NOTES
Neurosurgery Clinic  Neurosurgery followup:    HPI: Waqas is s/p L4-5 TLIF in 2/2022, with hardware removal from prior L5-S1 TLIF.  He was helping a friend drag a deer from the [address] on 10/8 this year, and then noticed pain in right low back and left thigh.  No bowel or bladder changes, no problems with balance or coordination.      Exam:  Constitutional:  Alert, well nourished, NAD.  HEENT: Normocephalic, atraumatic.   Pulm:  Without shortness of breath   CV:  No pitting edema of BLE.      Neurological:  Awake  Alert  Oriented x 3  Motor exam:        IP Q DF PF EHL  R   5  5   5   5    5  L   5  5   5   5    5     Reflexes are 2+ in the patellar and Achilles. There is no clonus. Downgoing Babinski.      Able to spontaneously move L/E bilaterally  Sensation intact throughout all L/E dermatomes      Imaging: AP and lateral lumbar x-rays show stable hardware.    A/P:    s/p L4-5 TLIF in 2/2022, with hardware removal from prior L5-S1 TLIF.     He had been doing very well up until October 8, when he was helping a friend drag a deer from the [address].  He then experienced a new onset of right-sided low back pain, with as well as pain that radiated into the left upper outer thigh.  He also has some numbness in this area.  He is not have any weakness in the lower extremities, and no bowel or bladder changes.  He understands his x-rays appear stable.  We discussed using a Medrol Dosepak to try to calm down some of his symptoms.  He did wish to proceed with that option.  We also sent a refill on his tizanidine, which he has not been using for the last several months.  He will continue to rest and use these medications as indicated.  We will see how he does.  If he does not improve over the next couple of weeks, we we will follow-up with him at that time to discuss further options.  He voiced agreement and understanding.    .      Pasquale Hurst PA-C  [facility] Neurosurgery  [facility]  [address] [facility]  [address] [address]    Tel [phone]  Pager [phone]      The use of Dragon/[employer] dictation services may have been used to construct the content in this note; any grammatical or spelling errors are non-intentional. Please contact the author of this note directly if you are in need of any clarification.

## 2022-10-28 NOTE — LETTER
10/28/2022         RE: Waqas Lechuga  [address]  [facility] [address]        Dear Colleague,    Thank you for referring your patient, Waqas Lechuga, to the [facility] NEUROSURGERY CLINIC [place]. Please see a copy of my visit note below.    Neurosurgery Clinic  Neurosurgery followup:    HPI: Waqas is s/p L4-5 TLIF in 2/2022, with hardware removal from prior L5-S1 TLIF.  He was helping a friend drag a deer from the woods on 10/8 this year, and then noticed pain in right low back and left thigh.  No bowel or bladder changes, no problems with balance or coordination.      Exam:  Constitutional:  Alert, well nourished, NAD.  HEENT: Normocephalic, atraumatic.   Pulm:  Without shortness of breath   CV:  No pitting edema of BLE.      Neurological:  Awake  Alert  Oriented x 3  Motor exam:        IP Q DF PF EHL  R   5  5   5   5    5  L   5  5   5   5    5     Reflexes are 2+ in the patellar and Achilles. There is no clonus. Downgoing Babinski.      Able to spontaneously move L/E bilaterally  Sensation intact throughout all L/E dermatomes      Imaging: AP and lateral lumbar x-rays show stable hardware.    A/P:    s/p L4-5 TLIF in 2/2022, with hardware removal from prior L5-S1 TLIF.     He had been doing very well up until October 8, when he was helping a friend drag a deer from the woods.  He then experienced a new onset of right-sided low back pain, with as well as pain that radiated into the left upper outer thigh.  He also has some numbness in this area.  He is not have any weakness in the lower extremities, and no bowel or bladder changes.  He understands his x-rays appear stable.  We discussed using a Medrol Dosepak to try to calm down some of his symptoms.  He did wish to proceed with that option.  We also sent a refill on his tizanidine, which he has not been using for the last several months.  He will continue to rest and use these medications as indicated.  We will see how he does.  If he  does not improve over the next couple of weeks, we we will follow-up with him at that time to discuss further options.  He voiced agreement and understanding.    .      Pasquale Hurst PA-C  [facility] Neurosurgery  [address] [address]    Tel [phone]  Pager [phone]      The use of Dragon/[employer] services may have been used to construct the content in this note; any grammatical or spelling errors are non-intentional. Please contact the author of this note directly if you are in need of any clarification.        Again, thank you for allowing me to participate in the care of your patient.        Sincerely,        Pasquale Hurst PA-C

## 2022-11-27 ENCOUNTER — HEALTH MAINTENANCE LETTER (OUTPATIENT)
Age: 57
End: 2022-11-27

## 2022-12-13 ENCOUNTER — TELEPHONE (OUTPATIENT)
Dept: NEUROSURGERY | Facility: CLINIC | Age: 57
End: 2022-12-13

## 2022-12-13 ENCOUNTER — MYC MEDICAL ADVICE (OUTPATIENT)
Dept: NEUROSURGERY | Facility: CLINIC | Age: 57
End: 2022-12-13

## 2022-12-13 DIAGNOSIS — Z98.1 S/P LUMBAR SPINAL FUSION: ICD-10-CM

## 2022-12-13 RX ORDER — TIZANIDINE 2 MG/1
2-4 TABLET ORAL 3 TIMES DAILY
Qty: 30 TABLET | Refills: 1 | OUTPATIENT
Start: 2022-12-13 | End: 2024-08-08

## 2022-12-13 NOTE — TELEPHONE ENCOUNTER
"Patient s/p L4-5 TLIF in 2/2022, with hardware removal from prior L5-S1 TLIF. Patients pain increased 10/8 while dragging a deer from the [employer].     Patient saw Pasquale Hurst PA-C on 10/28 and he recommended MDP and tizanidine refill. Pasquale also stated   \" If he does not improve over the next couple of weeks, we we will follow-up with him at that time to discuss further options\".     Patient called and  scheduled appt with Pasquale on 12/23 for ongoing symptoms and is wondering if he should have any imaging first. He is also requesting Tizanidine refill.     Attempted to reach out to patient for more information. Left VM on cell phone and home phone.   "

## 2022-12-13 NOTE — TELEPHONE ENCOUNTER
Patient called back. He reports after seeing Pasquale Hurst PA-C and starting the MDP his symptoms improved for a couple weeks, Last week while he was moving (carrying boxes) he noticed his back pain got way worse and he also noticed a new pain that goes down his RLE. He states the pain he previously had down his LLE has improved. Patient does not feel like he over did it while moving.     He is scheduled to see Pasquale on 12/23 and wants to know if she should get an MRI prior. He is also requesting tizanidine refill.

## 2022-12-13 NOTE — TELEPHONE ENCOUNTER
Reason for Call:  Other appointment    Detailed comments: Patient is calling because he is scheduled for a follow up with Pasquale Hurst on 12/23 and wondering if he should be getting a scan/mri done before he comes back in? He is still having a lot of pain. He's also wondering if he would be able to get more muscle relaxers as they do seem to help some. He uses [facility] in [place] on [address]. Please call patient.     Phone Number Patient can be reached at: Home number on file [phone] (home)    Best Time: any    Can we leave a detailed message on this number? YES    Call taken on 12/13/2022 at 1:31 PM by Anabell Vaughan

## 2022-12-16 NOTE — TELEPHONE ENCOUNTER
Patient sent [employer] message requesting call back due to ongoing pain. Patient is scheduled to see Pasquale Hurst PA-C 12/23/23.    Attempted to reach out to patient, no answer. Left voice message for patient to call clinic back to further discuss.

## 2022-12-19 ENCOUNTER — MYC REFILL (OUTPATIENT)
Dept: NEUROSURGERY | Facility: CLINIC | Age: 57
End: 2022-12-19

## 2022-12-19 DIAGNOSIS — Z98.1 S/P LUMBAR SPINAL FUSION: ICD-10-CM

## 2022-12-19 RX ORDER — METHYLPREDNISOLONE 4 MG
TABLET, DOSE PACK ORAL
Qty: 21 TABLET | Refills: 0 | Status: CANCELLED | OUTPATIENT
Start: 2022-12-19

## 2022-12-21 ENCOUNTER — TELEPHONE (OUTPATIENT)
Dept: NEUROSURGERY | Facility: CLINIC | Age: 57
End: 2022-12-21

## 2022-12-23 ENCOUNTER — VIRTUAL VISIT (OUTPATIENT)
Dept: NEUROSURGERY | Facility: CLINIC | Age: 57
End: 2022-12-23
Payer: OTHER MISCELLANEOUS

## 2022-12-23 DIAGNOSIS — Z98.1 S/P LUMBAR SPINAL FUSION: ICD-10-CM

## 2022-12-23 PROCEDURE — 99213 OFFICE O/P EST LOW 20 MIN: CPT | Mod: 95 | Performed by: PHYSICIAN ASSISTANT

## 2022-12-23 RX ORDER — TIZANIDINE 2 MG/1
2-4 TABLET ORAL 3 TIMES DAILY
Qty: 60 TABLET | Refills: 1 | Status: SHIPPED | OUTPATIENT
Start: 2022-12-23 | End: 2023-05-09

## 2022-12-23 RX ORDER — METHYLPREDNISOLONE 4 MG
TABLET, DOSE PACK ORAL
Qty: 21 TABLET | Refills: 0 | Status: SHIPPED | OUTPATIENT
Start: 2022-12-23 | End: 2023-05-09

## 2022-12-23 NOTE — LETTER
12/23/2022         RE: Waqas Lechuga  [address]  [facility] [address]        Dear Colleague,    Thank you for referring your patient, Waqas Lechuga, to the [facility] NEUROSURGERY CLINIC [place]. Please see a copy of my visit note below.    Waqas is a 57 year old who is being evaluated via a billable telephone visit.      What phone number would you like to be contacted at?   How would you like to obtain your AVS? MyChart    Distant Location (provider location):  Off-site        Subjective   Waqas is a 57 year old presenting for the following health issues:  RECHECK ( follow up)      HPI     Underwent lumbar fusion in February 2022.  He has had some ongoing issues with back pain since that time.  He responded positively to Medrol Dosepak in October of this year.  He also states his pain has returned to the same type he had preoperatively.  No bowel or bladder changes, no problems with balance or coordination.    Review of Systems   CONSTITUTIONAL: NEGATIVE for fever, chills, change in weight  ENT/MOUTH: NEGATIVE for ear, mouth and throat problems  RESP: NEGATIVE for significant cough or SOB  CV: NEGATIVE for chest pain, palpitations or peripheral edema     Objective           Vitals:  No vitals were obtained today due to virtual visit.    Physical Exam   healthy, alert and no distress  PSYCH: Alert and oriented times 3; coherent speech, normal   rate and volume, able to articulate logical thoughts, able   to abstract reason, no tangential thoughts, no hallucinations   or delusions  His affect is normal and pleasant  RESP: No cough, no audible wheezing, able to talk in full sentences  Remainder of exam unable to be completed due to telephone visits    Status post L4-S1 extension TLIF in February 2022.  He continues to deal with some aching back and bilateral leg pain.  This did respond positively to some Medrol Dosepak in October, but now symptoms have returned.  At this point, we  discussed obtaining a lumbar spine MRI, with and without contrast, and he did wish proceed with that option.           Phone call duration: 9 minutes          Again, thank you for allowing me to participate in the care of your patient.        Sincerely,        Pasquale Hurst PA-C

## 2022-12-23 NOTE — PROGRESS NOTES
Waqas is a 57 year old who is being evaluated via a billable telephone visit.      What phone number would you like to be contacted at?   How would you like to obtain your AVS? MyChart    Distant Location (provider location):  Off-site        Subjective   Waqas is a 57 year old presenting for the following health issues:  RECHECK ( follow up)      HPI     Underwent lumbar fusion in February 2022.  He has had some ongoing issues with back pain since that time.  He responded positively to Medrol Dosepak in October of this year.  He also states his pain has returned to the same type he had preoperatively.  No bowel or bladder changes, no problems with balance or coordination.    Review of Systems   CONSTITUTIONAL: NEGATIVE for fever, chills, change in weight  ENT/MOUTH: NEGATIVE for ear, mouth and throat problems  RESP: NEGATIVE for significant cough or SOB  CV: NEGATIVE for chest pain, palpitations or peripheral edema      Objective           Vitals:  No vitals were obtained today due to virtual visit.    Physical Exam   healthy, alert and no distress  PSYCH: Alert and oriented times 3; coherent speech, normal   rate and volume, able to articulate logical thoughts, able   to abstract reason, no tangential thoughts, no hallucinations   or delusions  His affect is normal and pleasant  RESP: No cough, no audible wheezing, able to talk in full sentences  Remainder of exam unable to be completed due to telephone visits    Status post L4-S1 extension TLIF in February 2022.  He continues to deal with some aching back and bilateral leg pain.  This did respond positively to some Medrol Dosepak in October, but now symptoms have returned.  At this point, we discussed obtaining a lumbar spine MRI, with and without contrast, and he did wish proceed with that option.            Phone call duration: 9 minutes

## 2023-03-13 ENCOUNTER — TELEPHONE (OUTPATIENT)
Dept: NEUROSURGERY | Facility: CLINIC | Age: 58
End: 2023-03-13

## 2023-03-13 NOTE — TELEPHONE ENCOUNTER
Patient wants to get MRI done at [facility] in [place] and needs MRI order to be sent to them. He is a patient of Pasquale Hurst. [facility] can't see the order that is already placed in his chart and need a new order sent to them. Patient would like to get it done soon because he has an appointment coming up with Pasquale in a few weeks.

## 2023-03-14 NOTE — TELEPHONE ENCOUNTER
Patient requesting update on his request to get MRI done at [facility]. And for the order to be sent to [facility].

## 2023-03-27 ENCOUNTER — TELEPHONE (OUTPATIENT)
Dept: NEUROSURGERY | Facility: CLINIC | Age: 58
End: 2023-03-27

## 2023-03-27 NOTE — TELEPHONE ENCOUNTER
LMTC- r/s from April 6th- No provider for [facility] .  Also need to confirm where patient is at with getting an MRI completed prior to appointment.

## 2023-03-28 ENCOUNTER — TELEPHONE (OUTPATIENT)
Dept: NEUROSURGERY | Facility: CLINIC | Age: 58
End: 2023-03-28

## 2023-03-28 NOTE — TELEPHONE ENCOUNTER
Patient over 1 year post op, our team typically does not fill past 3 months post op. Updated patient via [employer].

## 2023-03-28 NOTE — TELEPHONE ENCOUNTER
2nd attempt to reschedule- provider does not have voice mail capability, and no additional contacts in which to leave a message.

## 2023-03-29 ENCOUNTER — TELEPHONE (OUTPATIENT)
Dept: NEUROSURGERY | Facility: OTHER | Age: 58
End: 2023-03-29

## 2023-03-29 NOTE — TELEPHONE ENCOUNTER
Contacted [facility]: [phone]; requested patient report for MRI lumbar spine taken on 3/23/2023. Advised [facility] medical records that our team has imaging already and only need written imaging report. Medical records placed report in the fax que and we should be receiving shortly. Will document and scan into patient file once received.    Griselda Macias MA

## 2023-04-06 ENCOUNTER — TELEPHONE (OUTPATIENT)
Dept: NEUROSURGERY | Facility: CLINIC | Age: 58
End: 2023-04-06

## 2023-04-06 NOTE — TELEPHONE ENCOUNTER
Patient is a previous patient of  and has surgery with  in the past. Patient is requesting  to look over his MRI results which are in PACS before his appointment with him on Monday 4/10. Patient said he is in excruciating pain that he can't walk and would like to know next steps in treatment and is willing to come down to [place] from [place] just for an earlier appointment. He is requesting call back from 's team and would like to know what he can do in the meantime because of how much pain he is experiencing.

## 2023-04-10 ENCOUNTER — OFFICE VISIT (OUTPATIENT)
Dept: NEUROSURGERY | Facility: CLINIC | Age: 58
End: 2023-04-10
Payer: OTHER MISCELLANEOUS

## 2023-04-10 VITALS — DIASTOLIC BLOOD PRESSURE: 88 MMHG | SYSTOLIC BLOOD PRESSURE: 140 MMHG | OXYGEN SATURATION: 97 % | HEART RATE: 100 BPM

## 2023-04-10 DIAGNOSIS — M48.061 SPINAL STENOSIS OF LUMBAR REGION WITH RADICULOPATHY: Primary | ICD-10-CM

## 2023-04-10 DIAGNOSIS — M54.16 LUMBAR RADICULOPATHY: Primary | ICD-10-CM

## 2023-04-10 DIAGNOSIS — M54.16 SPINAL STENOSIS OF LUMBAR REGION WITH RADICULOPATHY: Primary | ICD-10-CM

## 2023-04-10 PROCEDURE — 99214 OFFICE O/P EST MOD 30 MIN: CPT | Performed by: NEUROLOGICAL SURGERY

## 2023-04-10 RX ORDER — OXYCODONE HYDROCHLORIDE 5 MG/1
5-10 TABLET ORAL EVERY 6 HOURS PRN
Qty: 12 TABLET | Refills: 0 | Status: SHIPPED | OUTPATIENT
Start: 2023-04-10 | End: 2023-04-13

## 2023-04-10 RX ORDER — TIZANIDINE 2 MG/1
2 TABLET ORAL 3 TIMES DAILY
Qty: 30 TABLET | Refills: 3 | Status: SHIPPED | OUTPATIENT
Start: 2023-04-10 | End: 2023-05-04

## 2023-04-10 ASSESSMENT — PAIN SCALES - GENERAL: PAINLEVEL: EXTREME PAIN (9)

## 2023-04-10 NOTE — Clinical Note
NDI/NAT: NDI/NAT not completed within last 6 months. Sent request for completion to support staff ([facility]: [facility] Neurosurgery [facility] Pool ([id number]))

## 2023-04-10 NOTE — LETTER
4/10/2023         RE: Waqas Lechuga  [address]  [place] [facility] [address]        Dear Colleague,    Thank you for referring your patient, Waqas Lechuga, to the [facility]. Please see a copy of my visit note below.    57 year old male with multiple prior back surgeries, presents with worsening back and right leg pain.  Underwent L4-5 extension PSF a year ago with L1-2 and L3-4 microdiscectomies.  Initially did very well after surgery.  Now with 2 months of severe throbbing pain with radiating to the right thigh and shin, and weakness in the leg, with near falls.  Has been trying home therapy exercises without improvement.  MR Lumbar, personally reviewed, with large right paracentral and caudally directed L3-4 disc herniation with severe stenosis.       Past Medical History:   Diagnosis Date     Alcohol abuse      Anxiety      Chemical dependency (H)     meth     Chronic midline low back pain      Compartment syndrome (H)      DDD (degenerative disc disease), lumbar      Drug-seeking behavior      Eczema      HLD (hyperlipidemia)      HTN (hypertension)      Insomnia      MDD (major depressive disorder)      Migraine      Migraine with status migrainosus, not intractable      Opioid dependence (H)      Other circadian rhythm sleep disorder      PTSD (post-traumatic stress disorder)      Pulmonary nodules      Syncope      Past Surgical History:   Procedure Laterality Date     CORONARY STENT PLACEMENT       DISCECTOMY LUMBAR POSTERIOR MICROSCOPIC TWO LEVELS Left 2/25/2022    Procedure: Left Lumbar 1 to Lumbar 2 and Lumbar 3 to Lumbar 4 microdiscectomies;  Surgeon: Demetri Stallings MD;  Location:  OR     HAND SURGERY       INGUINAL HERNIA REPAIR       LUMBAR FUSION       OPTICAL TRACKING SYSTEM FUSION SPINE POSTERIOR LUMBAR TWO LEVELS N/A 2/25/2022    Procedure: Lumbar 4 to Sacral 1 Extension Posterior Spinal Fusion with Lumbar 5 to Sacral 1 Hardware Removal;  Surgeon:  Demetri Stallings MD;  Location:  OR     Social History     Socioeconomic History     Marital status:      Spouse name: Not on file     Number of children: Not on file     Years of education: Not on file     Highest education level: Not on file   Occupational History     Not on file   Tobacco Use     Smoking status: Every Day     Packs/day: 0.25     Types: Cigarettes     Smokeless tobacco: Never   Vaping Use     Vaping status: Never Used   Substance and Sexual Activity     Alcohol use: Not Currently     Drug use: Yes     Types: Methamphetamines     Sexual activity: Not on file   Other Topics Concern     Parent/sibling w/ CABG, MI or angioplasty before 65F 55M? Not Asked   Social History Narrative     Not on file     Social Determinants of Health     Financial Resource Strain: Not on file   Food Insecurity: Not on file   Transportation Needs: Not on file   Physical Activity: Not on file   Stress: Not on file   Social Connections: Not on file   Intimate Partner Violence: Not on file   Housing Stability: Not on file     No family history on file.     ROS: 10 point ROS neg other than the symptoms noted above in the HPI.    Physical Exam  BP (!) 140/88   Pulse 100   SpO2 97%   HEENT:  Normocephalic, atraumatic.  PERRLA.  EOM s intact.  Visual fields full to gross exam  Neck:  Supple, non-tender, without lymphadenopathy.  Heart:  No peripheral edema  Lungs:  No SOB  Abdomen:  Non-distended.   Skin:  Warm and dry.  Extremities:  No edema, cyanosis or clubbing.  Psychiatric:  No apparent distress  Musculoskeletal:  Normal bulk and tone    NEUROLOGICAL EXAMINATION:     Mental status:  Alert and Oriented x 3, speech is fluent.  Cranial nerves:  II-XII intact.   Motor:    Shoulder Abduction:  Right:  5/5   Left:  5/5  Biceps:                      Right:  5/5   Left:  5/5  Triceps:                     Right:  5/5   Left:  5/5  Wrist Extensors:       Right:  5/5   Left:  5/5  Wrist Flexors:           Right:  5/5    Left:  5/5  interosseus :            Right:  5/5   Left:  5/5  Hip Flexion:                Right: 4+/5  Left:  5/5  Quadriceps:             Right:  4+/5  Left:  5/5  Hamstrings:             Right:  5/5  Left:  5/5  Gastroc Soleus:        Right:  5/5  Left:  5/5  Tib/Ant:                      Right:  5/5  Left:  5/5  EHL:                     Right:  5/5  Left:  5/5  Sensation:  Intact  Reflexes:  Negative Babinski.  Negative Clonus.  Negative Ríos's.  Coordination:  Smooth finger to nose testing.   Negative pronator drift.     A/P:  57 year old male with multiple prior back surgeries, presents with worsening back and right leg pain    I had a discussion with the patient, reviewing the history, symptoms, and imaging  Given his weakness and inability to participate in PT, we discussed option of surgery  Will require wide decompression and fusion  Plan for L3-4 extension TLIF  Risks and benefits discussed         Again, thank you for allowing me to participate in the care of your patient.        Sincerely,        Demetri Stallings MD

## 2023-04-10 NOTE — PATIENT INSTRUCTIONS
Patient Instructions    Surgery scheduled at [facility] for  TLIF (transforaminal lumbar interbody fusion) with Dr. Stallings     Pre-Operative    Surgical risks: blood clots in the leg or lung, problems urinating, nerve damage, drainage from the incision, infection, stiffness    You will need a Pre-operative physical with primary care physician within 30 days prior to your surgical date.     You will spend 2-4 nights in the hospital.    Smoking Cessation  You are advised to quit smoking immediately through recovery to help with healing and reduce risk of complications. Printout given.     Shower procedure  You will need to shower the night before and morning of surgery using the antimicrobial soap (chlorhexidine) given to you. Please refer to showering instruction sheet in your surgery education folder.    Eating/Drinking  Stop all solid foods 8 hours before surgery.  Keep drinking clear liquids until 4 hours before surgery  Clear liquids include water, clear juice, black coffee, or clear tea without milk, Gatorade, clear soda.     Medications  Discontinue Aspirin & NSAIDs (Advil/Ibuprofen, Indocin, Naproxen,Nuprin,Relafen/Nabumetone, Diclofenac,Meloxicam, Aleve, Celebrex) 7 days prior to surgical date. After surgery, do not begin taking these medications until given clearance as it may cause bleeding and interfere with healing.  Hold methotrexate, Xeljanz for 1-2 weeks prior to surgery and continue to hold 2 weeks post surgery.   It is ok to take Tylenol (Acetaminophen) for pain within the 7 days prior to surgery. Example: you could take 1000 mg 3 times per day. Do not exceed 3,000 mg per day.   If you are on chronic pain medication (oxycodone, Percocet, hydrocodone, Vicodin, Norco, Dilaudid, morphine, MS Contin, naltrexone, Suboxone, etc) or have a pain contract we will reach out to your pain clinic to gather your most recent records and recommendations for pain management post-op.   Please ask your provider who manages your chronic pain if they require you to schedule an office visit prior to surgery. Continue obtaining your pain medications from your current provider until surgery. Our team will manage your acute post-op pain in the hospital and during the recovery period. Your pain team will continue to manage your chronic pain.   Any other medications prescribed, please discuss with your primary care provider at your pre-operative physical        Post-Operative    Incision Care  Look at your incision site every day. You  may need a mirror or family member to help you.   Watch for signs of infection  Redness, swelling, warmth, drainage (Green or yellow drainage (pus) from your incision or increased bloody drainage), and fever of 101 degrees or higher  [facility] [phone]  Remove dressing as instructed upon discharge  Do not apply lotions or ointments to incision  It is okay to shower, just pat the incision dry   No submerging incision in water such as pools, hot tubs, or baths for at least 8 weeks and until the incision is healed    Pain Management  Dealing with pain  As your body heals, you might feel a stabbing, burning, or aching pain. You may also have some numbness.  Everyone feels pain differently, we may ask you to rate your pain using a pain scale. This will let us know how much pain you feel.   Keep in mind that medicine won't take away all of your pain. It helps to try other ways to relax and ease pain.   Things to help with pain  After surgery, we will give you medicine for your pain. These medications work well, but they can make you drowsy, itchy, or sick to your stomach. If we give you narcotics for pain, try to take the pills with food.   For mild to moderate pain, you can take medication such as Tylenol. These can be used with narcotics, but make sure that your narcotic does not contain Tylenol.   Do NOT drive while taking narcotic pain medication  Do NOT drink alcohol  while using any pain medication  You can utilize ice as needed (no longer than 20 minutes at one time)  Refills of pain medication: please call the neurosurgery clinic to request 2-3 days before you run out  Aspirin & NSAIDS (ex. Ibuprofen, aleve, naproxen): Don't take NSAIDs until 6 weeks after surgery to reduce risk of bleeding and interference with bone healing     Bowel Care  Many people have constipation (hard stools) after surgery. The narcotic pain medication we often prescribed can contribute to constipation. To help prevent constipation: Drink plenty of fluid (8-10 glasses/day); Eat more fiber, such as whole grain bread, bran cereal, and fruits and vegetables; Stay active by walking; Over the counter stool softener may also help.      Activity Restrictions  For the first 6-8 weeks, no lifting > 10 pounds, no bending, twisting, or overhead reaching.  SI Joint fusions: No lifting greater than 10 pounds and Toe Touch Weight Bearing x 2 weeks with crutches.   Take stairs in moderation   Walking is the best way to start exercise after surgery. Take short frequent walks. You may gradually increase the distance as tolerated. If you feel pain, decrease your activity, but DO NOT stop walking. Walking will help you gain strength and prevent muscle soreness and spasms.   Avoid bed rest and prolonged sitting for longer than 30 minutes (change positions frequently while awake)  No contact sports or high impact activities such as; running/jogging, snowmobile or 4 lyles riding or any other recreational vehicles until after given clearance at one of your follow up visits  If a brace is required per Dr. Stallings, Orthotics will fit you for the brace in the hospital. Brace type and length of time to wear it will be determined by Dr. Stallings.     Contact clinic right away or go to the Emergency Department if you develop:   Infection (incisional redness, swelling, warmth, drainage, or fever (temp > 101 F))  New injury  Bladder or  bowel changes or loss of control    Signs of blood clot:  Swelling and/or warmth in one or both legs  Pain or tenderness in your leg, ankle, foot, or arm   Red or discolored skin     Go to the Emergency Department   If sudden onset of severe headache, weakness, confusion, change in level of consciousness, pain, or loss of movement.  Chest pain  Trouble breathing     Post-Op Follow Up Appointments  2 week incision check & staple/suture removal with Neurosurgery Nurse  6 week post op with x-ray prior with our Physician Assistant or Nurse Practitioner  3 months post op with x-ray prior with our Physician Assistant or Nurse Practitioner  6 months post op with x-ray prior with our Physician Assistant or Nurse Practitioner  1 year post op with x-ray prior with our Physician Assistant or Nurse Practitioner  Please call to schedule follow up and xray appointments at [phone]    Resources  If you are currently employed, you will need to be off work for 4-6 weeks for recovery and healing.  Please fax any FMLA/short term disability paperwork to [phone] prior to surgery  You may call our clinic when you'd like to return to work and we can provide a work letter  To allow staff adequate time to complete paperwork, please send as soon as possible     [facility]  [facility] - [address] [address]  Telephone:  [phone]       Fax:  [phone]

## 2023-04-10 NOTE — NURSING NOTE
"Waqas Lechuga is a 57 year old male who presents for:  Chief Complaint   Patient presents with     RECHECK     Can barely stand or walk. Continues Pain        Initial Vitals:  BP (!) 140/88   Pulse 100   SpO2 97%  Estimated body mass index is 22.96 kg/m  as calculated from the following:    Height as of 6/3/22: 5' 10\" (1.778 m).    Weight as of 6/3/22: 160 lb (72.6 kg).. There is no height or weight on file to calculate BSA. BP completed using cuff size: regular  Extreme Pain (9)    Nursing Comments:     Dorian Mendenhall    "

## 2023-04-10 NOTE — PROGRESS NOTES
57 year old male with multiple prior back surgeries, presents with worsening back and right leg pain.  Underwent L4-5 extension PSF a year ago with L1-2 and L3-4 microdiscectomies.  Initially did very well after surgery.  Now with 2 months of severe throbbing pain with radiating to the right thigh and shin, and weakness in the leg, with near falls.  Has been trying home therapy exercises without improvement.  MR Lumbar, personally reviewed, with large right paracentral and caudally directed L3-4 disc herniation with severe stenosis.       Past Medical History:   Diagnosis Date     Alcohol abuse      Anxiety      Chemical dependency (H)     meth     Chronic midline low back pain      Compartment syndrome (H)      DDD (degenerative disc disease), lumbar      Drug-seeking behavior      Eczema      HLD (hyperlipidemia)      HTN (hypertension)      Insomnia      MDD (major depressive disorder)      Migraine      Migraine with status migrainosus, not intractable      Opioid dependence (H)      Other circadian rhythm sleep disorder      PTSD (post-traumatic stress disorder)      Pulmonary nodules      Syncope      Past Surgical History:   Procedure Laterality Date     CORONARY STENT PLACEMENT       DISCECTOMY LUMBAR POSTERIOR MICROSCOPIC TWO LEVELS Left 2/25/2022    Procedure: Left Lumbar 1 to Lumbar 2 and Lumbar 3 to Lumbar 4 microdiscectomies;  Surgeon: Demetri Stallings MD;  Location:  OR     HAND SURGERY       INGUINAL HERNIA REPAIR       LUMBAR FUSION       OPTICAL TRACKING SYSTEM FUSION SPINE POSTERIOR LUMBAR TWO LEVELS N/A 2/25/2022    Procedure: Lumbar 4 to Sacral 1 Extension Posterior Spinal Fusion with Lumbar 5 to Sacral 1 Hardware Removal;  Surgeon: Demetri Stallings MD;  Location:  OR     Social History     Socioeconomic History     Marital status:      Spouse name: Not on file     Number of children: Not on file     Years of education: Not on file     Highest education level: Not on file    Occupational History     Not on file   Tobacco Use     Smoking status: Every Day     Packs/day: 0.25     Types: Cigarettes     Smokeless tobacco: Never   Vaping Use     Vaping status: Never Used   Substance and Sexual Activity     Alcohol use: Not Currently     Drug use: Yes     Types: Methamphetamines     Sexual activity: Not on file   Other Topics Concern     Parent/sibling w/ CABG, MI or angioplasty before 65F 55M? Not Asked   Social History Narrative     Not on file     Social Determinants of Health     Financial Resource Strain: Not on file   Food Insecurity: Not on file   Transportation Needs: Not on file   Physical Activity: Not on file   Stress: Not on file   Social Connections: Not on file   Intimate Partner Violence: Not on file   Housing Stability: Not on file     No family history on file.     ROS: 10 point ROS neg other than the symptoms noted above in the HPI.    Physical Exam  BP (!) 140/88   Pulse 100   SpO2 97%   HEENT:  Normocephalic, atraumatic.  PERRLA.  EOM s intact.  Visual fields full to gross exam  Neck:  Supple, non-tender, without lymphadenopathy.  Heart:  No peripheral edema  Lungs:  No SOB  Abdomen:  Non-distended.   Skin:  Warm and dry.  Extremities:  No edema, cyanosis or clubbing.  Psychiatric:  No apparent distress  Musculoskeletal:  Normal bulk and tone    NEUROLOGICAL EXAMINATION:     Mental status:  Alert and Oriented x 3, speech is fluent.  Cranial nerves:  II-XII intact.   Motor:    Shoulder Abduction:  Right:  5/5   Left:  5/5  Biceps:                      Right:  5/5   Left:  5/5  Triceps:                     Right:  5/5   Left:  5/5  Wrist Extensors:       Right:  5/5   Left:  5/5  Wrist Flexors:           Right:  5/5   Left:  5/5  interosseus :            Right:  5/5   Left:  5/5  Hip Flexion:                Right: 4+/5  Left:  5/5  Quadriceps:             Right:  4+/5  Left:  5/5  Hamstrings:             Right:  5/5  Left:  5/5  Gastroc Soleus:        Right:  5/5  Left:   5/5  Tib/Ant:                      Right:  5/5  Left:  5/5  EHL:                     Right:  5/5  Left:  5/5  Sensation:  Intact  Reflexes:  Negative Babinski.  Negative Clonus.  Negative Ríos's.  Coordination:  Smooth finger to nose testing.   Negative pronator drift.     A/P:  57 year old male with multiple prior back surgeries, presents with worsening back and right leg pain    I had a discussion with the patient, reviewing the history, symptoms, and imaging  Given his weakness and inability to participate in PT, we discussed option of surgery  Will require wide decompression and fusion  Plan for L3-4 extension TLIF  Risks and benefits discussed

## 2023-04-10 NOTE — NURSING NOTE
Reviewed pre- and post-operative instructions as outlined in the After Visit Summary/Patient Instructions with patient.   Surgery folder was given to patient    Patient Education Topic: Procedure with Dr. Stallings     Learner(s): Patient    Knowledge Level: Basic    Readiness to Learn: Ready    Method:  Verbal explanation and Written material     Outcome: Able to verbalize instructions    Barriers to Learning: No barrier    Covid Testing: NA    Scheduling Number: [phone]    NDI/NAT: NDI/NAT not completed within last 6 months. Sent request for completion to support staff ([place]: [facility] Pool ([id number]))    Pain Clinic: N/A    Patient had the opportunity for questions to be answered. Advised Patient to call clinic with any questions/concerns. Gave patient antibacterial soap for pre-surgery skin preparation.

## 2023-04-17 DIAGNOSIS — M54.16 LUMBAR RADICULOPATHY: ICD-10-CM

## 2023-04-17 DIAGNOSIS — Z98.1 S/P LUMBAR SPINAL FUSION: ICD-10-CM

## 2023-04-18 RX ORDER — OXYCODONE HYDROCHLORIDE 5 MG/1
5 TABLET ORAL EVERY 6 HOURS PRN
Qty: 12 TABLET | Refills: 0 | Status: SHIPPED | OUTPATIENT
Start: 2023-04-18 | End: 2023-04-26

## 2023-04-18 NOTE — TELEPHONE ENCOUNTER
Pending Prescriptions:                       Disp   Refills    oxyCODONE (ROXICODONE) 5 MG tablet         12 tab*0        Sig: Take 1 tablet (5 mg) by mouth every 6 hours as needed           for severe pain    Routing refill request to provider for review/approval because:  Drug not on the [facility] refill protocol     oxyCODONE (ROXICODONE) 5 MG tablet 12 tablet 0 10/21/2022  No   Sig - Route: Take 1 tablet (5 mg) by mouth every 6 hours as needed for severe pain - Oral   Sent to pharmacy as: oxyCODONE HCl 5 MG Oral Tablet (ROXICODONE)   Class: E-Prescribe   Earliest Fill Date: 10/21/2022   Order: [id number]   E-Prescribing Status: Receipt confirmed by pharmacy (10/21/2022  2:55 PM CDT)     Ai Lee RN on 4/18/2023 at 11:19 AM

## 2023-04-24 DIAGNOSIS — Z98.1 S/P LUMBAR SPINAL FUSION: ICD-10-CM

## 2023-04-24 DIAGNOSIS — M54.16 LUMBAR RADICULOPATHY: ICD-10-CM

## 2023-04-25 ENCOUNTER — TELEPHONE (OUTPATIENT)
Dept: NEUROSURGERY | Facility: OTHER | Age: 58
End: 2023-04-25
Payer: COMMERCIAL

## 2023-04-25 NOTE — TELEPHONE ENCOUNTER
Pending Prescriptions:                       Disp   Refills    oxyCODONE (ROXICODONE) 5 MG tablet         12 tab*0        Sig: Take 1 tablet (5 mg) by mouth every 6 hours as needed           for severe pain    Routing refill request to provider for review/approval because:  Drug not on the [facility] refill protocol   Requested Prescriptions   Pending Prescriptions Disp Refills    oxyCODONE (ROXICODONE) 5 MG tablet 12 tablet 0     Sig: Take 1 tablet (5 mg) by mouth every 6 hours as needed for severe pain       There is no refill protocol information for this order

## 2023-04-25 NOTE — TELEPHONE ENCOUNTER
Patient called and stated that he's in a lot of pain and is needing refill sent to pharmacy ASAP. oxyCODONE (ROXICODONE) 5 MG tablet. Last refill sent 04/18/23. Pharmacy [id number] - [place], [place] - [facility] [address]    Patient would like a return call once refill has been sent.

## 2023-04-26 RX ORDER — OXYCODONE HYDROCHLORIDE 5 MG/1
5 TABLET ORAL EVERY 6 HOURS PRN
Qty: 12 TABLET | Refills: 0 | Status: SHIPPED | OUTPATIENT
Start: 2023-04-26 | End: 2023-05-04

## 2023-05-04 DIAGNOSIS — Z98.1 S/P LUMBAR SPINAL FUSION: ICD-10-CM

## 2023-05-04 DIAGNOSIS — M48.061 SPINAL STENOSIS OF LUMBAR REGION WITH RADICULOPATHY: ICD-10-CM

## 2023-05-04 DIAGNOSIS — M54.16 SPINAL STENOSIS OF LUMBAR REGION WITH RADICULOPATHY: ICD-10-CM

## 2023-05-04 DIAGNOSIS — M54.16 LUMBAR RADICULOPATHY: ICD-10-CM

## 2023-05-04 RX ORDER — OXYCODONE HYDROCHLORIDE 5 MG/1
5-10 TABLET ORAL EVERY 6 HOURS PRN
Qty: 30 TABLET | Refills: 0 | Status: ON HOLD | OUTPATIENT
Start: 2023-05-04 | End: 2023-05-15

## 2023-05-04 RX ORDER — TIZANIDINE 2 MG/1
2 TABLET ORAL 3 TIMES DAILY
Qty: 30 TABLET | Refills: 3 | Status: SHIPPED | OUTPATIENT
Start: 2023-05-04 | End: 2023-06-13

## 2023-05-04 NOTE — TELEPHONE ENCOUNTER
Attempted to reach out to patient, no answer. Left voice message for them to call clinic back to further discuss.   Informed patient we need updates before refill can be processed        
Patient calling for a refill of Oxycodone and Tizanidine     DOS: upcoming 5/10/23  Procedure: Lumbar 3 to Lumbar 4 Extension Transforaminal Lumbar Interbody Fusion  Surgeon: Dr Chandrakant Krause Post Op: n/a     Current symptom(s): Back and right leg pain. Severe throbbing pain with radiating to the right thigh and shin, and weakness in the leg, with near falls. Can't do anything but sit.   Current pain management:   Oxycodone: 1 tablets every 6 hours, at times needs 2 tabs. Order changed to reflect this. Also increased quantity to 30 from 12 tabs   Tizanidine: TID  [place] in [facility] [employer]       Last fill: Oxy 4/26 #12  Next visit: surgery on 5/10     Medication pended for your approval, if appropriate. Pharmacy verified.      Any patient questions or concerns:      Informed patient request will be forwarded to care team.   
Patient requesting refills for oxycodone and tiZANidine.   
2

## 2023-05-09 ENCOUNTER — ANESTHESIA EVENT (OUTPATIENT)
Dept: SURGERY | Facility: CLINIC | Age: 58
DRG: 455 | End: 2023-05-09
Payer: OTHER MISCELLANEOUS

## 2023-05-09 ASSESSMENT — LIFESTYLE VARIABLES: TOBACCO_USE: 1

## 2023-05-09 ASSESSMENT — COPD QUESTIONNAIRES: COPD: 1

## 2023-05-09 NOTE — PROGRESS NOTES
PTA medications updated by Medication Scribe prior to surgery via phone call with patient (last doses completed by Nurse)     Medication history sources: Patient, Surescripts and H&P  In the past week, patient estimated taking medication this percent of the time: Greater than 90%      Significant changes made to the medication list:  Patient reports no longer taking the following meds (med scribe removed from PTA med list): Robaxin, Medrol dosepak      Additional medication history information:   None    Medication reconciliation completed by provider prior to medication history? No    Time spent in this activity: 40 minutes    The information provided in this note is only as accurate as the sources available at the time of update(s)      Prior to Admission medications    Medication Sig Last Dose Taking? Auth Provider Long Term End Date   ALPRAZolam (XANAX) 1 MG tablet Take 1 mg by mouth 5/9/2023 at PM Yes Reported, Patient     chlorhexidine (HIBICLENS) 4 % liquid Apply topically See Admin Instructions Shower the night before and the morning of surgery  at AM Yes Demetri Stallings MD     escitalopram (LEXAPRO) 20 MG tablet Take 1 tablet by mouth daily   at AM Yes Reported, Patient Yes    oxyCODONE (ROXICODONE) 5 MG tablet Take 1-2 tablets (5-10 mg) by mouth every 6 hours as needed for severe pain Unknown at PRN Yes Rhea Hyde NP     rizatriptan (MAXALT-MLT) 10 MG ODT Take 10 mg by mouth 2 weeks ago at PRN Yes Reported, Patient     tiZANidine (ZANAFLEX) 2 MG tablet Take 1 tablet (2 mg) by mouth 3 times daily  at AM Yes Rhea Hyde NP No

## 2023-05-09 NOTE — ANESTHESIA PREPROCEDURE EVALUATION
Anesthesia Pre-Procedure Evaluation    Patient: Waqas Lechuga   MRN: [id number] : 1965        Procedure : Procedure(s):  Lumbar 3 to Lumbar 4 Extension Transforaminal Lumbar Interbody Fusion          Past Medical History:   Diagnosis Date     Alcohol abuse      Anxiety      Chemical dependency (H)     meth     Chronic midline low back pain      Compartment syndrome (H)      DDD (degenerative disc disease), lumbar      Drug-seeking behavior      Eczema      Eczema      Encounter for monitoring Suboxone maintenance therapy      HLD (hyperlipidemia)      HTN (hypertension)      Insomnia      Insomnia      MDD (major depressive disorder)      Migraine      Migraine with status migrainosus, not intractable      Opioid dependence (H)      Opioid dependence in remission (H)      Other circadian rhythm sleep disorder      PTSD (post-traumatic stress disorder)      PTSD (post-traumatic stress disorder)      Pulmonary nodules      Syncope       Past Surgical History:   Procedure Laterality Date     CORONARY STENT PLACEMENT       DISCECTOMY LUMBAR POSTERIOR MICROSCOPIC TWO LEVELS Left 2022    Procedure: Left Lumbar 1 to Lumbar 2 and Lumbar 3 to Lumbar 4 microdiscectomies;  Surgeon: Demetri Stallings MD;  Location:  OR     HAND SURGERY       INGUINAL HERNIA REPAIR       LUMBAR FUSION       OPTICAL TRACKING SYSTEM FUSION SPINE POSTERIOR LUMBAR TWO LEVELS N/A 2022    Procedure: Lumbar 4 to Sacral 1 Extension Posterior Spinal Fusion with Lumbar 5 to Sacral 1 Hardware Removal;  Surgeon: Demetri Stallings MD;  Location:  OR      Allergies   Allergen Reactions     Prochlorperazine Other (See Comments)     OHC Comment: Dystonic rxn; OHC Reaction: Other  Other reaction(s): *Unknown     Meperidine Rash     Trazodone Other (See Comments) and Unknown     Erection that lasted a long time  OHC Comment: Patient states he gets an erection. ; OHC Reaction: Unknown; OHC Reaction: 17; OHC Severity: Low; OHC Noted:  [id number]  Erection that lasted a long time  Patient states he gets an erection.         Social History     Tobacco Use     Smoking status: Every Day     Packs/day: 0.50     Years: 36.00     Pack years: 18.00     Types: Cigarettes     Smokeless tobacco: Never   Vaping Use     Vaping status: Never Used   Substance Use Topics     Alcohol use: Not Currently      Wt Readings from Last 1 Encounters:   06/03/22 72.6 kg (160 lb)        Prior to Admission medications    Medication Sig Start Date End Date Taking? Authorizing Provider   ALPRAZolam (XANAX) 1 MG tablet Take 1 mg by mouth 9/4/21  Yes Reported, Patient   chlorhexidine (HIBICLENS) 4 % liquid Apply topically See Admin Instructions Shower the night before and the morning of surgery 4/10/23  Yes Demetri Stallings MD   escitalopram (LEXAPRO) 20 MG tablet Take 1 tablet by mouth daily  12/1/20  Yes Reported, Patient   oxyCODONE (ROXICODONE) 5 MG tablet Take 1-2 tablets (5-10 mg) by mouth every 6 hours as needed for severe pain 5/4/23  Yes Rhea Hyde NP   rizatriptan (MAXALT-MLT) 10 MG ODT Take 10 mg by mouth 8/5/21  Yes Reported, Patient   tiZANidine (ZANAFLEX) 2 MG tablet Take 1 tablet (2 mg) by mouth 3 times daily 5/4/23  Yes Rhea Hyde NP     Labs 4/19/23: hgb 13.3  ECG 4/19/23: NSR, LYNDA    Anesthesia Evaluation   Pt has had prior anesthetic. Type: General.    No history of anesthetic complications       ROS/MED HX  ENT/Pulmonary:     (+) tobacco use, Current use, COPD,  (-) asthma and sleep apnea   Neurologic:     (+) migraines,  (-) no seizures and no CVA   Cardiovascular:     (+) Dyslipidemia hypertension--CAD --stent-2014.  (-) GOETZ, arrhythmias and valvular problems/murmurs   METS/Exercise Tolerance: >4 METS    Hematologic:    (-) history of blood clots and anemia   Musculoskeletal:    (-) arthritis   GI/Hepatic:    (-) GERD and liver disease   Renal/Genitourinary:    (-) renal disease and nephrolithiasis   Endo:    (-) Type I DM, Type II DM,  thyroid disease and obesity   Psychiatric/Substance Use:     (+) psychiatric history anxiety and depression (PTSD) H/O chronic opiod use .     Infectious Disease:    (-) Recent Fever   Malignancy:       Other:      (+) , H/O Chronic Pain,        Physical Exam    Airway        Mallampati: II   TM distance: > 3 FB   Neck ROM: full   Mouth opening: > 3 cm    Respiratory Devices and Support         Dental       (+) Multiple visibly decayed, broken teeth      Cardiovascular   cardiovascular exam normal       Rhythm and rate: regular and normal     Pulmonary   pulmonary exam normal        breath sounds clear to auscultation           OUTSIDE LABS:  COAGS: No results found for: PTT, INR, FIBR  POC: No results found for: BGM, HCG, HCGS  HEPATIC: No results found for: ALBUMIN, PROTTOTAL, ALT, AST, GGT, ALKPHOS, BILITOTAL, BILIDIRECT, ROXANA  OTHER: No results found for: PH, LACT, A1C, NAHOMI, PHOS, MAG, LIPASE, AMYLASE, TSH, T4, T3, CRP, SED    Anesthesia Plan    ASA Status:  3   NPO Status:  NPO Appropriate    Anesthesia Type: General.     - Airway: ETT   Induction: Propofol.   Maintenance: Balanced.   Techniques and Equipment:     - Lines/Monitors: 2nd IV     - Blood: T&S     Consents    Anesthesia Plan(s) and associated risks, benefits, and realistic alternatives discussed. Questions answered and patient/representative(s) expressed understanding.    - Discussed:     - Discussed with:  Patient         Postoperative Care    Pain management: Multi-modal analgesia, IV analgesics.   PONV prophylaxis: Ondansetron (or other 5HT-3), Dexamethasone or Solumedrol     Comments:    Other Comments: Dexmedetomidine infusion  Toradol  Ketamine  Kwasiaudid            Oilva Rodríguez MD

## 2023-05-10 ENCOUNTER — HOSPITAL ENCOUNTER (INPATIENT)
Facility: CLINIC | Age: 58
LOS: 5 days | Discharge: HOME | DRG: 455 | End: 2023-05-15
Attending: NEUROLOGICAL SURGERY | Admitting: ANESTHESIOLOGY
Payer: OTHER MISCELLANEOUS

## 2023-05-10 ENCOUNTER — APPOINTMENT (OUTPATIENT)
Dept: GENERAL RADIOLOGY | Facility: CLINIC | Age: 58
DRG: 455 | End: 2023-05-10
Attending: NEUROLOGICAL SURGERY
Payer: OTHER MISCELLANEOUS

## 2023-05-10 ENCOUNTER — ANESTHESIA (OUTPATIENT)
Dept: SURGERY | Facility: CLINIC | Age: 58
DRG: 455 | End: 2023-05-10
Payer: OTHER MISCELLANEOUS

## 2023-05-10 DIAGNOSIS — M54.16 LUMBAR RADICULOPATHY: ICD-10-CM

## 2023-05-10 DIAGNOSIS — Z98.1 S/P LUMBAR SPINAL FUSION: Primary | ICD-10-CM

## 2023-05-10 LAB
ABO/RH(D): NORMAL
ANION GAP SERPL CALCULATED.3IONS-SCNC: 8 MMOL/L (ref 7–15)
ANTIBODY SCREEN: NEGATIVE
BUN SERPL-MCNC: 21.8 MG/DL (ref 6–20)
CALCIUM SERPL-MCNC: 9.2 MG/DL (ref 8.6–10)
CHLORIDE SERPL-SCNC: 102 MMOL/L (ref 98–107)
CREAT SERPL-MCNC: 1.5 MG/DL (ref 0.67–1.17)
DEPRECATED HCO3 PLAS-SCNC: 27 MMOL/L (ref 22–29)
GFR SERPL CREATININE-BSD FRML MDRD: 54 ML/MIN/1.73M2
GLUCOSE SERPL-MCNC: 94 MG/DL (ref 70–99)
PLATELET # BLD AUTO: 254 10E3/UL (ref 150–450)
POTASSIUM SERPL-SCNC: 3.7 MMOL/L (ref 3.4–5.3)
SODIUM SERPL-SCNC: 137 MMOL/L (ref 136–145)
SPECIMEN EXPIRATION DATE: NORMAL

## 2023-05-10 PROCEDURE — 0ST20ZZ RESECTION OF LUMBAR VERTEBRAL DISC, OPEN APPROACH: ICD-10-PCS | Performed by: NEUROLOGICAL SURGERY

## 2023-05-10 PROCEDURE — 370N000017 HC ANESTHESIA TECHNICAL FEE, PER MIN: Performed by: NEUROLOGICAL SURGERY

## 2023-05-10 PROCEDURE — 22633 ARTHRD CMBN 1NTRSPC LUMBAR: CPT | Mod: AS | Performed by: NURSE PRACTITIONER

## 2023-05-10 PROCEDURE — 120N000001 HC R&B MED SURG/OB

## 2023-05-10 PROCEDURE — 22842 INSERT SPINE FIXATION DEVICE: CPT | Performed by: NEUROLOGICAL SURGERY

## 2023-05-10 PROCEDURE — 250N000009 HC RX 250: Performed by: NEUROLOGICAL SURGERY

## 2023-05-10 PROCEDURE — 85049 AUTOMATED PLATELET COUNT: CPT | Performed by: ANESTHESIOLOGY

## 2023-05-10 PROCEDURE — 258N000003 HC RX IP 258 OP 636: Performed by: NURSE PRACTITIONER

## 2023-05-10 PROCEDURE — 250N000009 HC RX 250: Performed by: REGISTERED NURSE

## 2023-05-10 PROCEDURE — 999N000141 HC STATISTIC PRE-PROCEDURE NURSING ASSESSMENT: Performed by: NEUROLOGICAL SURGERY

## 2023-05-10 PROCEDURE — 258N000003 HC RX IP 258 OP 636: Performed by: ANESTHESIOLOGY

## 2023-05-10 PROCEDURE — 63052 LAM FACETC/FRMT ARTHRD LUM 1: CPT | Mod: AS | Performed by: NURSE PRACTITIONER

## 2023-05-10 PROCEDURE — 22853 INSJ BIOMECHANICAL DEVICE: CPT | Mod: AS | Performed by: NURSE PRACTITIONER

## 2023-05-10 PROCEDURE — 8E0WXBF COMPUTER ASSISTED PROCEDURE OF TRUNK REGION, WITH FLUOROSCOPY: ICD-10-PCS | Performed by: NEUROLOGICAL SURGERY

## 2023-05-10 PROCEDURE — C1713 ANCHOR/SCREW BN/BN,TIS/BN: HCPCS | Performed by: NEUROLOGICAL SURGERY

## 2023-05-10 PROCEDURE — 63052 LAM FACETC/FRMT ARTHRD LUM 1: CPT | Performed by: NEUROLOGICAL SURGERY

## 2023-05-10 PROCEDURE — 86850 RBC ANTIBODY SCREEN: CPT | Performed by: ANESTHESIOLOGY

## 2023-05-10 PROCEDURE — 0SG1071 FUSION OF 2 OR MORE LUMBAR VERTEBRAL JOINTS WITH AUTOLOGOUS TISSUE SUBSTITUTE, POSTERIOR APPROACH, POSTERIOR COLUMN, OPEN APPROACH: ICD-10-PCS | Performed by: NEUROLOGICAL SURGERY

## 2023-05-10 PROCEDURE — 22842 INSERT SPINE FIXATION DEVICE: CPT | Mod: AS | Performed by: NURSE PRACTITIONER

## 2023-05-10 PROCEDURE — 360N000085 HC SURGERY LEVEL 5 W/ FLUORO, PER MIN: Performed by: NEUROLOGICAL SURGERY

## 2023-05-10 PROCEDURE — 01NB0ZZ RELEASE LUMBAR NERVE, OPEN APPROACH: ICD-10-PCS | Performed by: NEUROLOGICAL SURGERY

## 2023-05-10 PROCEDURE — 250N000013 HC RX MED GY IP 250 OP 250 PS 637: Performed by: NURSE PRACTITIONER

## 2023-05-10 PROCEDURE — 250N000011 HC RX IP 250 OP 636: Performed by: NURSE PRACTITIONER

## 2023-05-10 PROCEDURE — 258N000003 HC RX IP 258 OP 636: Performed by: REGISTERED NURSE

## 2023-05-10 PROCEDURE — 20930 SP BONE ALGRFT MORSEL ADD-ON: CPT | Performed by: NEUROLOGICAL SURGERY

## 2023-05-10 PROCEDURE — 22614 ARTHRD PST TQ 1NTRSPC EA ADD: CPT | Performed by: NEUROLOGICAL SURGERY

## 2023-05-10 PROCEDURE — 61783 SCAN PROC SPINAL: CPT | Mod: 59 | Performed by: NEUROLOGICAL SURGERY

## 2023-05-10 PROCEDURE — P9045 ALBUMIN (HUMAN), 5%, 250 ML: HCPCS | Performed by: REGISTERED NURSE

## 2023-05-10 PROCEDURE — 61783 SCAN PROC SPINAL: CPT | Mod: AS | Performed by: NURSE PRACTITIONER

## 2023-05-10 PROCEDURE — 250N000011 HC RX IP 250 OP 636: Performed by: ANESTHESIOLOGY

## 2023-05-10 PROCEDURE — C1762 CONN TISS, HUMAN(INC FASCIA): HCPCS | Performed by: NEUROLOGICAL SURGERY

## 2023-05-10 PROCEDURE — 999N000179 XR SURGERY CARM FLUORO LESS THAN 5 MIN W STILLS

## 2023-05-10 PROCEDURE — 22614 ARTHRD PST TQ 1NTRSPC EA ADD: CPT | Mod: AS | Performed by: NURSE PRACTITIONER

## 2023-05-10 PROCEDURE — 0SG00KJ FUSION OF LUMBAR VERTEBRAL JOINT WITH NONAUTOLOGOUS TISSUE SUBSTITUTE, POSTERIOR APPROACH, ANTERIOR COLUMN, OPEN APPROACH: ICD-10-PCS | Performed by: NEUROLOGICAL SURGERY

## 2023-05-10 PROCEDURE — 20936 SP BONE AGRFT LOCAL ADD-ON: CPT | Performed by: NEUROLOGICAL SURGERY

## 2023-05-10 PROCEDURE — 22853 INSJ BIOMECHANICAL DEVICE: CPT | Performed by: NEUROLOGICAL SURGERY

## 2023-05-10 PROCEDURE — 272N000001 HC OR GENERAL SUPPLY STERILE: Performed by: NEUROLOGICAL SURGERY

## 2023-05-10 PROCEDURE — L8699 PROSTHETIC IMPLANT NOS: HCPCS | Performed by: NEUROLOGICAL SURGERY

## 2023-05-10 PROCEDURE — 710N000009 HC RECOVERY PHASE 1, LEVEL 1, PER MIN: Performed by: NEUROLOGICAL SURGERY

## 2023-05-10 PROCEDURE — 250N000011 HC RX IP 250 OP 636: Performed by: REGISTERED NURSE

## 2023-05-10 PROCEDURE — 0SP004Z REMOVAL OF INTERNAL FIXATION DEVICE FROM LUMBAR VERTEBRAL JOINT, OPEN APPROACH: ICD-10-PCS | Performed by: NEUROLOGICAL SURGERY

## 2023-05-10 PROCEDURE — 82310 ASSAY OF CALCIUM: CPT | Performed by: ANESTHESIOLOGY

## 2023-05-10 PROCEDURE — 250N000025 HC SEVOFLURANE, PER MIN: Performed by: NEUROLOGICAL SURGERY

## 2023-05-10 PROCEDURE — 36415 COLL VENOUS BLD VENIPUNCTURE: CPT | Performed by: ANESTHESIOLOGY

## 2023-05-10 PROCEDURE — 22633 ARTHRD CMBN 1NTRSPC LUMBAR: CPT | Performed by: NEUROLOGICAL SURGERY

## 2023-05-10 PROCEDURE — 250N000011 HC RX IP 250 OP 636: Performed by: NEUROLOGICAL SURGERY

## 2023-05-10 DEVICE — GRAFT BONE FIBERS DBF 9ML INJ T50309 PRELOADED: Type: IMPLANTABLE DEVICE | Site: SPINE LUMBAR | Status: FUNCTIONAL

## 2023-05-10 DEVICE — SPACER 6068076 CATALYFT PL LONG 7MM
Type: IMPLANTABLE DEVICE | Site: SPINE LUMBAR | Status: FUNCTIONAL
Brand: CATALYFT PL EXPANDABLE INTERBODY SYSTEM

## 2023-05-10 DEVICE — IMP ROD MEDT SOLERA CVD 5.5X60MM TI 1553201060: Type: IMPLANTABLE DEVICE | Site: SPINE LUMBAR | Status: FUNCTIONAL

## 2023-05-10 DEVICE — GRAFT BONE INFUSE BMP SM 7510200: Type: IMPLANTABLE DEVICE | Site: SPINE LUMBAR | Status: FUNCTIONAL

## 2023-05-10 DEVICE — SCREW MEDTRONIC SET MULTIAXIAL CHARGE BY PK OF 2EA 559200007: Type: IMPLANTABLE DEVICE | Site: SPINE LUMBAR | Status: FUNCTIONAL

## 2023-05-10 DEVICE — IMPLANTABLE DEVICE: Type: IMPLANTABLE DEVICE | Site: SPINE LUMBAR | Status: FUNCTIONAL

## 2023-05-10 DEVICE — IMP ROD MEDT SOLERA CVD 5.5X70MM TI 1553201070: Type: IMPLANTABLE DEVICE | Site: SPINE LUMBAR | Status: FUNCTIONAL

## 2023-05-10 DEVICE — SCREW MEDTRONIC SET MULTIAXIAL CHARGE BY PK OF 4EA 559200008: Type: IMPLANTABLE DEVICE | Site: SPINE LUMBAR | Status: FUNCTIONAL

## 2023-05-10 DEVICE — KIT BNGF 6CC DMNR CORT FBR ACCELERATE GRFTN CNN T50206: Type: IMPLANTABLE DEVICE | Site: SPINE LUMBAR | Status: FUNCTIONAL

## 2023-05-10 RX ORDER — POLYETHYLENE GLYCOL 3350 17 G/17G
17 POWDER, FOR SOLUTION ORAL DAILY
Status: DISCONTINUED | OUTPATIENT
Start: 2023-05-11 | End: 2023-05-15 | Stop reason: HOSPADM

## 2023-05-10 RX ORDER — GLYCOPYRROLATE 0.2 MG/ML
INJECTION, SOLUTION INTRAMUSCULAR; INTRAVENOUS PRN
Status: DISCONTINUED | OUTPATIENT
Start: 2023-05-10 | End: 2023-05-10

## 2023-05-10 RX ORDER — HYDROXYZINE HYDROCHLORIDE 25 MG/1
25 TABLET, FILM COATED ORAL EVERY 6 HOURS PRN
Status: DISCONTINUED | OUTPATIENT
Start: 2023-05-10 | End: 2023-05-15 | Stop reason: HOSPADM

## 2023-05-10 RX ORDER — ONDANSETRON 2 MG/ML
INJECTION INTRAMUSCULAR; INTRAVENOUS PRN
Status: DISCONTINUED | OUTPATIENT
Start: 2023-05-10 | End: 2023-05-10

## 2023-05-10 RX ORDER — NALOXONE HYDROCHLORIDE 0.4 MG/ML
0.4 INJECTION, SOLUTION INTRAMUSCULAR; INTRAVENOUS; SUBCUTANEOUS
Status: DISCONTINUED | OUTPATIENT
Start: 2023-05-10 | End: 2023-05-15 | Stop reason: HOSPADM

## 2023-05-10 RX ORDER — NALOXONE HYDROCHLORIDE 0.4 MG/ML
0.2 INJECTION, SOLUTION INTRAMUSCULAR; INTRAVENOUS; SUBCUTANEOUS
Status: DISCONTINUED | OUTPATIENT
Start: 2023-05-10 | End: 2023-05-15 | Stop reason: HOSPADM

## 2023-05-10 RX ORDER — FENTANYL CITRATE 50 UG/ML
50 INJECTION, SOLUTION INTRAMUSCULAR; INTRAVENOUS ONCE
Status: COMPLETED | OUTPATIENT
Start: 2023-05-10 | End: 2023-05-10

## 2023-05-10 RX ORDER — LIDOCAINE HYDROCHLORIDE 20 MG/ML
INJECTION, SOLUTION INFILTRATION; PERINEURAL PRN
Status: DISCONTINUED | OUTPATIENT
Start: 2023-05-10 | End: 2023-05-10

## 2023-05-10 RX ORDER — ESCITALOPRAM OXALATE 20 MG/1
20 TABLET ORAL DAILY
Status: DISCONTINUED | OUTPATIENT
Start: 2023-05-11 | End: 2023-05-15 | Stop reason: HOSPADM

## 2023-05-10 RX ORDER — ONDANSETRON 4 MG/1
4 TABLET, ORALLY DISINTEGRATING ORAL EVERY 30 MIN PRN
Status: DISCONTINUED | OUTPATIENT
Start: 2023-05-10 | End: 2023-05-10 | Stop reason: HOSPADM

## 2023-05-10 RX ORDER — KETAMINE HYDROCHLORIDE 10 MG/ML
INJECTION INTRAMUSCULAR; INTRAVENOUS PRN
Status: DISCONTINUED | OUTPATIENT
Start: 2023-05-10 | End: 2023-05-10

## 2023-05-10 RX ORDER — ONDANSETRON 2 MG/ML
4 INJECTION INTRAMUSCULAR; INTRAVENOUS EVERY 30 MIN PRN
Status: DISCONTINUED | OUTPATIENT
Start: 2023-05-10 | End: 2023-05-10 | Stop reason: HOSPADM

## 2023-05-10 RX ORDER — ONDANSETRON 2 MG/ML
4 INJECTION INTRAMUSCULAR; INTRAVENOUS EVERY 6 HOURS PRN
Status: DISCONTINUED | OUTPATIENT
Start: 2023-05-10 | End: 2023-05-15 | Stop reason: HOSPADM

## 2023-05-10 RX ORDER — HYDROMORPHONE HCL IN WATER/PF 6 MG/30 ML
0.4 PATIENT CONTROLLED ANALGESIA SYRINGE INTRAVENOUS EVERY 5 MIN PRN
Status: DISCONTINUED | OUTPATIENT
Start: 2023-05-10 | End: 2023-05-10 | Stop reason: HOSPADM

## 2023-05-10 RX ORDER — HYDROMORPHONE HCL IN WATER/PF 6 MG/30 ML
0.2 PATIENT CONTROLLED ANALGESIA SYRINGE INTRAVENOUS EVERY 5 MIN PRN
Status: DISCONTINUED | OUTPATIENT
Start: 2023-05-10 | End: 2023-05-10 | Stop reason: HOSPADM

## 2023-05-10 RX ORDER — SODIUM CHLORIDE, SODIUM LACTATE, POTASSIUM CHLORIDE, CALCIUM CHLORIDE 600; 310; 30; 20 MG/100ML; MG/100ML; MG/100ML; MG/100ML
INJECTION, SOLUTION INTRAVENOUS CONTINUOUS
Status: DISCONTINUED | OUTPATIENT
Start: 2023-05-10 | End: 2023-05-10 | Stop reason: HOSPADM

## 2023-05-10 RX ORDER — AMOXICILLIN 250 MG
1 CAPSULE ORAL 2 TIMES DAILY
Status: DISCONTINUED | OUTPATIENT
Start: 2023-05-10 | End: 2023-05-15 | Stop reason: HOSPADM

## 2023-05-10 RX ORDER — PROPOFOL 10 MG/ML
INJECTION, EMULSION INTRAVENOUS CONTINUOUS PRN
Status: DISCONTINUED | OUTPATIENT
Start: 2023-05-10 | End: 2023-05-10

## 2023-05-10 RX ORDER — SODIUM CHLORIDE 9 MG/ML
INJECTION, SOLUTION INTRAVENOUS CONTINUOUS PRN
Status: DISCONTINUED | OUTPATIENT
Start: 2023-05-10 | End: 2023-05-10

## 2023-05-10 RX ORDER — HYDROMORPHONE HCL IN WATER/PF 6 MG/30 ML
0.2 PATIENT CONTROLLED ANALGESIA SYRINGE INTRAVENOUS
Status: DISCONTINUED | OUTPATIENT
Start: 2023-05-10 | End: 2023-05-15 | Stop reason: HOSPADM

## 2023-05-10 RX ORDER — BUPIVACAINE HYDROCHLORIDE AND EPINEPHRINE 5; 5 MG/ML; UG/ML
INJECTION, SOLUTION PERINEURAL PRN
Status: DISCONTINUED | OUTPATIENT
Start: 2023-05-10 | End: 2023-05-10 | Stop reason: HOSPADM

## 2023-05-10 RX ORDER — CEFAZOLIN SODIUM/WATER 2 G/20 ML
2 SYRINGE (ML) INTRAVENOUS
Status: COMPLETED | OUTPATIENT
Start: 2023-05-10 | End: 2023-05-10

## 2023-05-10 RX ORDER — LIDOCAINE 40 MG/G
CREAM TOPICAL
Status: DISCONTINUED | OUTPATIENT
Start: 2023-05-10 | End: 2023-05-15 | Stop reason: HOSPADM

## 2023-05-10 RX ORDER — SODIUM CHLORIDE 9 MG/ML
INJECTION, SOLUTION INTRAVENOUS CONTINUOUS
Status: DISCONTINUED | OUTPATIENT
Start: 2023-05-10 | End: 2023-05-15

## 2023-05-10 RX ORDER — BISACODYL 10 MG
10 SUPPOSITORY, RECTAL RECTAL DAILY PRN
Status: DISCONTINUED | OUTPATIENT
Start: 2023-05-10 | End: 2023-05-15 | Stop reason: HOSPADM

## 2023-05-10 RX ORDER — HYDROMORPHONE HCL IN WATER/PF 6 MG/30 ML
0.4 PATIENT CONTROLLED ANALGESIA SYRINGE INTRAVENOUS
Status: DISCONTINUED | OUTPATIENT
Start: 2023-05-10 | End: 2023-05-15 | Stop reason: HOSPADM

## 2023-05-10 RX ORDER — FENTANYL CITRATE 0.05 MG/ML
50 INJECTION, SOLUTION INTRAMUSCULAR; INTRAVENOUS EVERY 5 MIN PRN
Status: DISCONTINUED | OUTPATIENT
Start: 2023-05-10 | End: 2023-05-10 | Stop reason: HOSPADM

## 2023-05-10 RX ORDER — FENTANYL CITRATE 50 UG/ML
INJECTION, SOLUTION INTRAMUSCULAR; INTRAVENOUS PRN
Status: DISCONTINUED | OUTPATIENT
Start: 2023-05-10 | End: 2023-05-10

## 2023-05-10 RX ORDER — ACETAMINOPHEN 325 MG/1
975 TABLET ORAL EVERY 8 HOURS
Status: COMPLETED | OUTPATIENT
Start: 2023-05-10 | End: 2023-05-13

## 2023-05-10 RX ORDER — OXYCODONE HYDROCHLORIDE 5 MG/1
5 TABLET ORAL EVERY 4 HOURS PRN
Status: DISCONTINUED | OUTPATIENT
Start: 2023-05-10 | End: 2023-05-15 | Stop reason: HOSPADM

## 2023-05-10 RX ORDER — CALCIUM CARBONATE 500 MG/1
500 TABLET, CHEWABLE ORAL 4 TIMES DAILY PRN
Status: DISCONTINUED | OUTPATIENT
Start: 2023-05-10 | End: 2023-05-15 | Stop reason: HOSPADM

## 2023-05-10 RX ORDER — CEFAZOLIN SODIUM/WATER 2 G/20 ML
2 SYRINGE (ML) INTRAVENOUS SEE ADMIN INSTRUCTIONS
Status: DISCONTINUED | OUTPATIENT
Start: 2023-05-10 | End: 2023-05-10 | Stop reason: HOSPADM

## 2023-05-10 RX ORDER — ALPRAZOLAM 0.25 MG
1 TABLET ORAL 3 TIMES DAILY PRN
Status: DISCONTINUED | OUTPATIENT
Start: 2023-05-10 | End: 2023-05-15 | Stop reason: HOSPADM

## 2023-05-10 RX ORDER — ACETAMINOPHEN 325 MG/1
650 TABLET ORAL EVERY 4 HOURS PRN
Status: DISCONTINUED | OUTPATIENT
Start: 2023-05-13 | End: 2023-05-15 | Stop reason: HOSPADM

## 2023-05-10 RX ORDER — CEFAZOLIN SODIUM 1 G/3ML
1 INJECTION, POWDER, FOR SOLUTION INTRAMUSCULAR; INTRAVENOUS EVERY 8 HOURS
Status: DISCONTINUED | OUTPATIENT
Start: 2023-05-10 | End: 2023-05-15

## 2023-05-10 RX ORDER — FENTANYL CITRATE 0.05 MG/ML
25 INJECTION, SOLUTION INTRAMUSCULAR; INTRAVENOUS EVERY 5 MIN PRN
Status: DISCONTINUED | OUTPATIENT
Start: 2023-05-10 | End: 2023-05-10 | Stop reason: HOSPADM

## 2023-05-10 RX ORDER — ONDANSETRON 4 MG/1
4 TABLET, ORALLY DISINTEGRATING ORAL EVERY 6 HOURS PRN
Status: DISCONTINUED | OUTPATIENT
Start: 2023-05-10 | End: 2023-05-15 | Stop reason: HOSPADM

## 2023-05-10 RX ORDER — DEXAMETHASONE SODIUM PHOSPHATE 4 MG/ML
INJECTION, SOLUTION INTRA-ARTICULAR; INTRALESIONAL; INTRAMUSCULAR; INTRAVENOUS; SOFT TISSUE PRN
Status: DISCONTINUED | OUTPATIENT
Start: 2023-05-10 | End: 2023-05-10

## 2023-05-10 RX ORDER — PROPOFOL 10 MG/ML
INJECTION, EMULSION INTRAVENOUS PRN
Status: DISCONTINUED | OUTPATIENT
Start: 2023-05-10 | End: 2023-05-10

## 2023-05-10 RX ORDER — GABAPENTIN 300 MG/1
300 CAPSULE ORAL
Status: COMPLETED | OUTPATIENT
Start: 2023-05-10 | End: 2023-05-10

## 2023-05-10 RX ORDER — TIZANIDINE 2 MG/1
4 TABLET ORAL EVERY 6 HOURS PRN
Status: DISCONTINUED | OUTPATIENT
Start: 2023-05-10 | End: 2023-05-15 | Stop reason: HOSPADM

## 2023-05-10 RX ORDER — OXYCODONE HYDROCHLORIDE 5 MG/1
10 TABLET ORAL EVERY 4 HOURS PRN
Status: DISCONTINUED | OUTPATIENT
Start: 2023-05-10 | End: 2023-05-15 | Stop reason: HOSPADM

## 2023-05-10 RX ADMIN — GLYCOPYRROLATE 0.2 MG: 0.2 INJECTION, SOLUTION INTRAMUSCULAR; INTRAVENOUS at 11:02

## 2023-05-10 RX ADMIN — PHENYLEPHRINE HYDROCHLORIDE 200 MCG: 10 INJECTION INTRAVENOUS at 11:49

## 2023-05-10 RX ADMIN — FENTANYL CITRATE 50 MCG: 50 INJECTION, SOLUTION INTRAMUSCULAR; INTRAVENOUS at 10:05

## 2023-05-10 RX ADMIN — SODIUM CHLORIDE, POTASSIUM CHLORIDE, SODIUM LACTATE AND CALCIUM CHLORIDE: 600; 310; 30; 20 INJECTION, SOLUTION INTRAVENOUS at 08:17

## 2023-05-10 RX ADMIN — Medication 2 G: at 10:03

## 2023-05-10 RX ADMIN — FENTANYL CITRATE 50 MCG: 50 INJECTION, SOLUTION INTRAMUSCULAR; INTRAVENOUS at 08:38

## 2023-05-10 RX ADMIN — ACETAMINOPHEN 975 MG: 325 TABLET ORAL at 16:15

## 2023-05-10 RX ADMIN — PROPOFOL 190 MG: 10 INJECTION, EMULSION INTRAVENOUS at 10:06

## 2023-05-10 RX ADMIN — Medication 10 MG: at 12:00

## 2023-05-10 RX ADMIN — PHENYLEPHRINE HYDROCHLORIDE 150 MCG: 10 INJECTION INTRAVENOUS at 10:49

## 2023-05-10 RX ADMIN — ROCURONIUM BROMIDE 20 MG: 50 INJECTION, SOLUTION INTRAVENOUS at 10:55

## 2023-05-10 RX ADMIN — ONDANSETRON 4 MG: 2 INJECTION INTRAMUSCULAR; INTRAVENOUS at 12:50

## 2023-05-10 RX ADMIN — HYDROMORPHONE HYDROCHLORIDE 0.4 MG: 0.2 INJECTION, SOLUTION INTRAMUSCULAR; INTRAVENOUS; SUBCUTANEOUS at 16:11

## 2023-05-10 RX ADMIN — DEXMEDETOMIDINE HYDROCHLORIDE 0.3 MCG/KG/HR: 100 INJECTION, SOLUTION INTRAVENOUS at 10:25

## 2023-05-10 RX ADMIN — Medication 20 MG: at 10:06

## 2023-05-10 RX ADMIN — Medication 10 MG: at 11:00

## 2023-05-10 RX ADMIN — ONDANSETRON 4 MG: 2 INJECTION INTRAMUSCULAR; INTRAVENOUS at 10:29

## 2023-05-10 RX ADMIN — HYDROMORPHONE HYDROCHLORIDE 0.5 MG: 1 INJECTION, SOLUTION INTRAMUSCULAR; INTRAVENOUS; SUBCUTANEOUS at 12:18

## 2023-05-10 RX ADMIN — SODIUM CHLORIDE: 9 INJECTION, SOLUTION INTRAVENOUS at 16:16

## 2023-05-10 RX ADMIN — PROPOFOL 30 MCG/KG/MIN: 10 INJECTION, EMULSION INTRAVENOUS at 10:25

## 2023-05-10 RX ADMIN — OXYCODONE HYDROCHLORIDE 10 MG: 5 TABLET ORAL at 17:49

## 2023-05-10 RX ADMIN — SODIUM CHLORIDE, POTASSIUM CHLORIDE, SODIUM LACTATE AND CALCIUM CHLORIDE: 600; 310; 30; 20 INJECTION, SOLUTION INTRAVENOUS at 09:59

## 2023-05-10 RX ADMIN — SUGAMMADEX 50 MG: 100 INJECTION, SOLUTION INTRAVENOUS at 12:56

## 2023-05-10 RX ADMIN — ROCURONIUM BROMIDE 10 MG: 50 INJECTION, SOLUTION INTRAVENOUS at 12:26

## 2023-05-10 RX ADMIN — ROCURONIUM BROMIDE 10 MG: 50 INJECTION, SOLUTION INTRAVENOUS at 12:00

## 2023-05-10 RX ADMIN — GABAPENTIN 300 MG: 300 CAPSULE ORAL at 08:07

## 2023-05-10 RX ADMIN — LIDOCAINE HYDROCHLORIDE 100 MG: 20 INJECTION, SOLUTION INFILTRATION; PERINEURAL at 10:05

## 2023-05-10 RX ADMIN — PHENYLEPHRINE HYDROCHLORIDE 0.3 MCG/KG/MIN: 10 INJECTION INTRAVENOUS at 10:25

## 2023-05-10 RX ADMIN — SODIUM CHLORIDE: 9 INJECTION, SOLUTION INTRAVENOUS at 10:25

## 2023-05-10 RX ADMIN — DEXAMETHASONE SODIUM PHOSPHATE 4 MG: 4 INJECTION, SOLUTION INTRA-ARTICULAR; INTRALESIONAL; INTRAMUSCULAR; INTRAVENOUS; SOFT TISSUE at 10:29

## 2023-05-10 RX ADMIN — FENTANYL CITRATE 50 MCG: 50 INJECTION, SOLUTION INTRAMUSCULAR; INTRAVENOUS at 14:56

## 2023-05-10 RX ADMIN — MIDAZOLAM 2 MG: 1 INJECTION INTRAMUSCULAR; INTRAVENOUS at 09:59

## 2023-05-10 RX ADMIN — SODIUM CHLORIDE, POTASSIUM CHLORIDE, SODIUM LACTATE AND CALCIUM CHLORIDE: 600; 310; 30; 20 INJECTION, SOLUTION INTRAVENOUS at 11:52

## 2023-05-10 RX ADMIN — ROCURONIUM BROMIDE 10 MG: 50 INJECTION, SOLUTION INTRAVENOUS at 11:31

## 2023-05-10 RX ADMIN — TIZANIDINE 4 MG: 2 TABLET ORAL at 17:49

## 2023-05-10 RX ADMIN — ALBUMIN HUMAN: 0.05 INJECTION, SOLUTION INTRAVENOUS at 11:56

## 2023-05-10 RX ADMIN — HYDROMORPHONE HYDROCHLORIDE 0.5 MG: 1 INJECTION, SOLUTION INTRAMUSCULAR; INTRAVENOUS; SUBCUTANEOUS at 13:01

## 2023-05-10 RX ADMIN — FENTANYL CITRATE 50 MCG: 50 INJECTION, SOLUTION INTRAMUSCULAR; INTRAVENOUS at 14:17

## 2023-05-10 RX ADMIN — SUGAMMADEX 150 MG: 100 INJECTION, SOLUTION INTRAVENOUS at 13:10

## 2023-05-10 RX ADMIN — SENNOSIDES AND DOCUSATE SODIUM 1 TABLET: 50; 8.6 TABLET ORAL at 22:53

## 2023-05-10 RX ADMIN — ROCURONIUM BROMIDE 50 MG: 50 INJECTION, SOLUTION INTRAVENOUS at 10:07

## 2023-05-10 RX ADMIN — CEFAZOLIN 1 G: 1 INJECTION, POWDER, FOR SOLUTION INTRAMUSCULAR; INTRAVENOUS at 17:41

## 2023-05-10 ASSESSMENT — ACTIVITIES OF DAILY LIVING (ADL)
ADLS_ACUITY_SCORE: 28
ADLS_ACUITY_SCORE: 26

## 2023-05-10 ASSESSMENT — ENCOUNTER SYMPTOMS
DYSRHYTHMIAS: 0
SEIZURES: 0

## 2023-05-10 NOTE — BRIEF OP NOTE
[facility] Brief Operative Note    Pre-operative diagnosis: Lumbar radiculopathy [M54.16]   Post-operative diagnosis As above   Procedure: Procedure(s):  Lumbar 3 to Lumbar 4 Extension Transforaminal Lumbar Interbody Fusion   Surgeon(s): Surgeon(s) and Role:     * Demetri Stallings MD - Primary   Estimated blood loss: 300 mL    Specimens: * No specimens in log *   Findings: See op note

## 2023-05-10 NOTE — ANESTHESIA CARE TRANSFER NOTE
Patient: Waqas Lechuga    Procedure: Procedure(s):  Lumbar 3 to Lumbar 4 Extension Transforaminal Lumbar Interbody Fusion       Diagnosis: Lumbar radiculopathy [M54.16]  Diagnosis Additional Information: No value filed.    Anesthesia Type:   General     Note:    Oropharynx: oral airway in place  Level of Consciousness: drowsy  Oxygen Supplementation: face mask  Level of Supplemental Oxygen (L/min / FiO2): 6  Independent Airway: airway patency not satisfactory and stable  Dentition: dentition unchanged  Vital Signs Stable: post-procedure vital signs reviewed and stable  Report to RN Given: handoff report given  Patient transferred to: PACU    Handoff Report: Identifed the Patient, Identified the Reponsible Provider, Reviewed the pertinent medical history, Discussed the surgical course, Reviewed Intra-OP anesthesia mangement and issues during anesthesia, Set expectations for post-procedure period and Allowed opportunity for questions and acknowledgement of understanding      Vitals:  Vitals Value Taken Time   /77    Temp 98    Pulse 77 05/10/23 1323   Resp 16 05/10/23 1323   SpO2 100 % 05/10/23 1323   Vitals shown include unvalidated device data.    Electronically Signed By: HOME Coates CRNA  May 10, 2023  1:25 PM

## 2023-05-10 NOTE — PHARMACY-ADMISSION MEDICATION HISTORY
Brief Medication History Follow-Up Note:   See medication history note by Marlene Hammond from 5/9/2023 for full details.     Multiple prescription medications on PTA medication list do not have recent fill history; discussed the following with patient:     -Alprazolam 1 mg last filled 7/2022 #30ds #90 - patient states this was prescribed up to three times daily, but generally takes two doses per day.     -Escitalopram does not have any fill history via [employer] in the last 12 months; but patient confirms taking more recently than citalopram, which was last filled 20 mg tablets 11/2022 #30ds #30.     -Updated rizatriptan dosing based on Rx instructions on PTA med list (last filled 12/2022 #18ds).    -Oxycodone, tizanidine, and chlorhexidine are only medications with recent fill history. Patient mentioned previous admission/prescriptions may have been billed to [employer], which may not appear on [employer].     Lou Mcclure, PharmD

## 2023-05-10 NOTE — OP NOTE
Date of surgery: May 10, 2023  Surgeon: Demetri Stallings MD  Assistant: Dalia Patel NP  (Note: The assistant was present for and assisted with the entire surgery, and his/her role as an assistant was crucial for aid in positioning, exposure, suctioning, retraction, and closure)      Preoperative diagnosis: History of prior Lumbar fusion, Adjacent segment degeneration  Postoperative diagnosis: History of prior Lumbar fusion, Adjacent segment degeneration      Procedure:  1.  Removal of L4-L5 bilateral pedicle screws and rods  2.  Exploration of prior L4-S1 fusion  3.  L3-5 posterior segmental instrumentation with insertion of bilateral pedicle screws and rods (Medtronic Module X)  4.  L3-4 bilateral laminectomies for decompression of stenosis  5.  Right L3-4 complete facetectomy, transforaminal discectomy, and interbody arthrodesis  6.  L3-4 insertion of intervertebral graft with allograft  7.  L3-4 and L4-5 posterolateral arthrodesis and fusion with autograft and allograft  8.  Use of intraoperative microscope and fluoroscopy  9.  [employer] O-arm intraoperative CT scan and Stealth navigation      EBL: 300 mL      Indications: 57 year old male with prior L4-S1 fusion, presented with progressive back and leg pain and neurogenic claudications.  Imaging showed large right L3-4 disc herniation and severe stenosis.  Underwent extensive non-operative management with failure to improve.  Risks, benefits, indications, and alternatives were discussed with the patient and family in detail, and they wished to proceed.      Description of surgery: The patient was positioned prone.  Sterile prepping and draping procedures were performed.  Antibiotics were administered and timeout was performed.  A midline lumbar incision was performed.  The monopolar was used to expose the spinous processes, lamina, facets, and transverse processes from L3-S1.  The previous hardware was visualized and identified as Medtronic.  Using removal  instrumentation, bilateral set screws and rods were removed at L4-5.  The previous fusion was explored and no overt instability was identified.  Under fluoroscopy, new bilateral L3 pedicle screws were inserted.  An O-arm intraoperative CT scan was performed, which identified the hardware to be in appropriate position, and Stealth navigation was registered.  The Leksell rongeurs were then used to remove the spinous process at L3-L4.  The high speed drill was then used to perform bilateral laminectomies a L3-L4.  The Kerrison rongeurs were then used to remove the ligamentum flavum with decompression of the nerve roots.  The drill was used to perform a complete right facetectomy at L3-L4.  The caudally extruded disc herniation was evacuated with pituitary rongeurs.  The 15 blade was used to perform an annulotomy in the disk space.  A complete discectomy was performed with henrietta, the pituitary rongeurs, and curets.  Interbody arthrodesis was performed with henrietta and curets.   An intervertebral graft was inserted into the disk space at L3-L4 with allograft.  Rods and set screws were inserted.  Antibiotic irrigation was performed.  Hemostasis was achieved.  The bilateral transverse processes at L3-L4 and L4-L5 were arthrodesed, and autograft and allograft were packed for posterolateral fusion.  Fluoroscopy demonstrated excellent positioning of the hardware.  The fascia was closed with 0-Vicryl sutures, and the dermal layer was closed with 2-0 vicryl sutures.  The skin was closed with staples.  There were no intraprocedural complications.

## 2023-05-10 NOTE — PROVIDER NOTIFICATION
To Jocelyn Neurosurg PAC at [phone]   POD 0 Inferior portion of dressing moist sanguineous drainage saturation leaking onto bed, please advise. BP 96/54, pt on IVF, denies dizzy. Pt requests his PTA Xanax TID PRN. Thanks  Karen [phone]    Provider called back and put in orders and gave telephone order to do new dressing change. RN confirmed if it was okay to do gauze and tape, provider agreed this was okay. RN did new sterile dressing change.

## 2023-05-10 NOTE — PLAN OF CARE
Goal Outcome Evaluation:  Denies CP, SOB. Pt was getting teary eyed talking about late wife who  in January, with pt's permission, RN put in  consult. CMS intact. All pt needs met at this time.

## 2023-05-10 NOTE — PROGRESS NOTES
Patient has stated that his spouse  in January due to a stroke. When asked if there is anyone to update after surgery he states there is no one.  He recently sold his home and has moved into an apartment in [place].   His friend recently passed from, Multi system organ failure and is missing the  as he is here today at the hospital.  Uncertain who he would like to have pick him up from the hospital. Stating he took an uber to the hospital. Is leaning towards having his Brother Jacinto Lechuga pick him up/help him at home. [phone].

## 2023-05-10 NOTE — ANESTHESIA PROCEDURE NOTES
Airway       Patient location during procedure: OR       Procedure Start/Stop Times: 5/10/2023 10:09 AM  Staff -        Anesthesiologist:  Oliva Rodríguez MD       CRNA: Torsten Rice APRN CRNA       Performed By: CRNA  Consent for Airway        Urgency: elective  Indications and Patient Condition       Indications for airway management: andrews-procedural         Mask difficulty assessment: 2 - vent by mask + OA or adjuvant +/- NMBA    Final Airway Details       Final airway type: endotracheal airway       Successful airway: ETT - single  Endotracheal Airway Details        ETT size (mm): 8.0       Cuffed: yes       Cuff volume (mL): 8       Successful intubation technique: direct laryngoscopy       DL Blade Type: Alvares 2       Grade View of Cords: 1       Adjucts: stylet       Position: Right       Measured from: lips       Secured at (cm): 23       Bite block used: None    Post intubation assessment        Placement verified by: capnometry, equal breath sounds and chest rise        Number of attempts at approach: 1       Number of other approaches attempted: 0       Secured with: pink tape       Ease of procedure: easy       Dentition: Intact and Unchanged    Medication(s) Administered   Medication Administration Time: 5/10/2023 10:09 AM

## 2023-05-10 NOTE — ANESTHESIA POSTPROCEDURE EVALUATION
Patient: Waqas Lechuga    Procedure: Procedure(s):  Lumbar 3 to Lumbar 4 Extension Transforaminal Lumbar Interbody Fusion       Anesthesia Type:  General    Note:  Disposition: Outpatient   Postop Pain Control: Uneventful            Sign Out: Well controlled pain   PONV: No   Neuro/Psych: Uneventful            Sign Out: Acceptable/Baseline neuro status   Airway/Respiratory: Uneventful            Sign Out: Acceptable/Baseline resp. status   CV/Hemodynamics: Uneventful            Sign Out: Acceptable CV status; No obvious hypovolemia; No obvious fluid overload   Other NRE: NONE   DID A NON-ROUTINE EVENT OCCUR? No           Last vitals:  Vitals Value Taken Time   /72 05/10/23 1515   Temp 37.2  C (98.9  F) 05/10/23 1445   Pulse 86 05/10/23 1527   Resp 18 05/10/23 1527   SpO2 98 % 05/10/23 1527   Vitals shown include unvalidated device data.    Electronically Signed By: Oliva Rodríguez MD  May 10, 2023  5:33 PM

## 2023-05-11 ENCOUNTER — APPOINTMENT (OUTPATIENT)
Dept: PHYSICAL THERAPY | Facility: CLINIC | Age: 58
DRG: 455 | End: 2023-05-11
Attending: NEUROLOGICAL SURGERY
Payer: OTHER MISCELLANEOUS

## 2023-05-11 LAB
GLUCOSE BLDC GLUCOMTR-MCNC: 99 MG/DL (ref 70–99)
HGB BLD-MCNC: 10.7 G/DL (ref 13.3–17.7)
POTASSIUM SERPL-SCNC: 4.2 MMOL/L (ref 3.4–5.3)

## 2023-05-11 PROCEDURE — 36415 COLL VENOUS BLD VENIPUNCTURE: CPT | Performed by: NEUROLOGICAL SURGERY

## 2023-05-11 PROCEDURE — 84132 ASSAY OF SERUM POTASSIUM: CPT | Performed by: NEUROLOGICAL SURGERY

## 2023-05-11 PROCEDURE — 85018 HEMOGLOBIN: CPT | Performed by: NURSE PRACTITIONER

## 2023-05-11 PROCEDURE — 120N000001 HC R&B MED SURG/OB

## 2023-05-11 PROCEDURE — 97161 PT EVAL LOW COMPLEX 20 MIN: CPT | Mod: GP

## 2023-05-11 PROCEDURE — 250N000013 HC RX MED GY IP 250 OP 250 PS 637: Performed by: PHYSICIAN ASSISTANT

## 2023-05-11 PROCEDURE — 258N000003 HC RX IP 258 OP 636: Performed by: PHYSICIAN ASSISTANT

## 2023-05-11 PROCEDURE — 250N000013 HC RX MED GY IP 250 OP 250 PS 637: Performed by: NURSE PRACTITIONER

## 2023-05-11 PROCEDURE — 250N000012 HC RX MED GY IP 250 OP 636 PS 637: Performed by: NURSE PRACTITIONER

## 2023-05-11 PROCEDURE — 250N000011 HC RX IP 250 OP 636: Performed by: NURSE PRACTITIONER

## 2023-05-11 PROCEDURE — 97116 GAIT TRAINING THERAPY: CPT | Mod: GP

## 2023-05-11 PROCEDURE — 97530 THERAPEUTIC ACTIVITIES: CPT | Mod: GP

## 2023-05-11 RX ORDER — METHYLPREDNISOLONE 4 MG/1
4 TABLET ORAL
Status: COMPLETED | OUTPATIENT
Start: 2023-05-12 | End: 2023-05-13

## 2023-05-11 RX ORDER — METHYLPREDNISOLONE 4 MG/1
4 TABLET ORAL ONCE
Status: COMPLETED | OUTPATIENT
Start: 2023-05-11 | End: 2023-05-11

## 2023-05-11 RX ORDER — METHYLPREDNISOLONE 4 MG/1
4 TABLET ORAL
Status: DISCONTINUED | OUTPATIENT
Start: 2023-05-12 | End: 2023-05-15 | Stop reason: HOSPADM

## 2023-05-11 RX ORDER — METHYLPREDNISOLONE 4 MG/1
8 TABLET ORAL ONCE
Status: COMPLETED | OUTPATIENT
Start: 2023-05-11 | End: 2023-05-11

## 2023-05-11 RX ORDER — METHYLPREDNISOLONE 4 MG/1
8 TABLET ORAL AT BEDTIME
Status: COMPLETED | OUTPATIENT
Start: 2023-05-11 | End: 2023-05-12

## 2023-05-11 RX ORDER — METHYLPREDNISOLONE 4 MG/1
4 TABLET ORAL
Status: COMPLETED | OUTPATIENT
Start: 2023-05-12 | End: 2023-05-14

## 2023-05-11 RX ORDER — METHYLPREDNISOLONE 4 MG/1
4 TABLET ORAL AT BEDTIME
Status: DISCONTINUED | OUTPATIENT
Start: 2023-05-13 | End: 2023-05-15 | Stop reason: HOSPADM

## 2023-05-11 RX ADMIN — METHYLPREDNISOLONE 4 MG: 4 TABLET ORAL at 19:41

## 2023-05-11 RX ADMIN — HYDROXYZINE HYDROCHLORIDE 25 MG: 25 TABLET, FILM COATED ORAL at 00:27

## 2023-05-11 RX ADMIN — ESCITALOPRAM OXALATE 20 MG: 20 TABLET, FILM COATED ORAL at 08:52

## 2023-05-11 RX ADMIN — ACETAMINOPHEN 975 MG: 325 TABLET ORAL at 15:23

## 2023-05-11 RX ADMIN — TIZANIDINE 4 MG: 2 TABLET ORAL at 08:52

## 2023-05-11 RX ADMIN — CEFAZOLIN 1 G: 1 INJECTION, POWDER, FOR SOLUTION INTRAMUSCULAR; INTRAVENOUS at 02:15

## 2023-05-11 RX ADMIN — ACETAMINOPHEN 975 MG: 325 TABLET ORAL at 08:52

## 2023-05-11 RX ADMIN — HYDROMORPHONE HYDROCHLORIDE 0.4 MG: 0.2 INJECTION, SOLUTION INTRAMUSCULAR; INTRAVENOUS; SUBCUTANEOUS at 13:32

## 2023-05-11 RX ADMIN — TIZANIDINE 4 MG: 2 TABLET ORAL at 21:29

## 2023-05-11 RX ADMIN — HYDROMORPHONE HYDROCHLORIDE 0.4 MG: 0.2 INJECTION, SOLUTION INTRAMUSCULAR; INTRAVENOUS; SUBCUTANEOUS at 18:32

## 2023-05-11 RX ADMIN — OXYCODONE HYDROCHLORIDE 10 MG: 5 TABLET ORAL at 21:29

## 2023-05-11 RX ADMIN — ACETAMINOPHEN 975 MG: 325 TABLET ORAL at 00:27

## 2023-05-11 RX ADMIN — TIZANIDINE 4 MG: 2 TABLET ORAL at 15:32

## 2023-05-11 RX ADMIN — OXYCODONE HYDROCHLORIDE 10 MG: 5 TABLET ORAL at 00:27

## 2023-05-11 RX ADMIN — OXYCODONE HYDROCHLORIDE 10 MG: 5 TABLET ORAL at 08:51

## 2023-05-11 RX ADMIN — METHYLPREDNISOLONE 4 MG: 4 TABLET ORAL at 18:33

## 2023-05-11 RX ADMIN — METHYLPREDNISOLONE 8 MG: 4 TABLET ORAL at 18:33

## 2023-05-11 RX ADMIN — METHYLPREDNISOLONE 8 MG: 4 TABLET ORAL at 21:59

## 2023-05-11 RX ADMIN — SENNOSIDES AND DOCUSATE SODIUM 1 TABLET: 50; 8.6 TABLET ORAL at 21:29

## 2023-05-11 RX ADMIN — OXYCODONE HYDROCHLORIDE 10 MG: 5 TABLET ORAL at 15:09

## 2023-05-11 RX ADMIN — ALPRAZOLAM 1 MG: 0.25 TABLET ORAL at 21:29

## 2023-05-11 RX ADMIN — TIZANIDINE 4 MG: 2 TABLET ORAL at 00:27

## 2023-05-11 RX ADMIN — SODIUM CHLORIDE: 9 INJECTION, SOLUTION INTRAVENOUS at 00:55

## 2023-05-11 RX ADMIN — CEFAZOLIN 1 G: 1 INJECTION, POWDER, FOR SOLUTION INTRAMUSCULAR; INTRAVENOUS at 11:46

## 2023-05-11 RX ADMIN — CEFAZOLIN 1 G: 1 INJECTION, POWDER, FOR SOLUTION INTRAMUSCULAR; INTRAVENOUS at 21:31

## 2023-05-11 ASSESSMENT — ACTIVITIES OF DAILY LIVING (ADL)
ADLS_ACUITY_SCORE: 28
ADLS_ACUITY_SCORE: 34
ADLS_ACUITY_SCORE: 28

## 2023-05-11 NOTE — PLAN OF CARE
"Goal Outcome Evaluation:    Denies CP, SOB. JASMEET drain in. Pt voiding. All pt needs met at this time. Spiritual health saw pt today. Pt ambulated in hallway and said he \"was not able to do that before surgery without a ton of pain\". Author called Pharm and they said that it was okay to give the 12 mg oral steriod at same time with 4 mg an hour after.     "

## 2023-05-11 NOTE — PLAN OF CARE
Goal Outcome Evaluation:    A&Ox4. VSS on RA. Back dressing CDI. CMS intact. JASMEET drain intact & patent. NS infusing. Lamar in place & patent. Not OOB. Pain managed with oxycodone, atarax, tylenol & zanaflex. Will continue to monitor.

## 2023-05-11 NOTE — PROGRESS NOTES
"SPIRITUAL HEALTH SERVICES Progress Note  [facility]  Ortho Spine    Saw pt Waqas MARTINEZ Ankush per [facility] consult request (routine) for emotional support..    Patient/Family Understanding of Illness and Goals of Care - Sidney disclosed that he is recovering from back surgery and expects to discharge from hospital in the next 48 hours.    Distress and Loss - Sidney expressed feelings of deep sadness about the sudden loss of his wife in January of this year. 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 He feels that \"I might have been able to save her if I'd been home\". He described his  wife as a \"great mother\" and recalled they had many good times together, but that alcohol abuse had been a serious issue in her life.    Strengths, Coping, and Resources - Sidney talked enthusiastically about his love of nature and hunting. He shared nature photos with . Sidney states that his primary network of support consists of close relationships with his brother and friends.    Meaning, Beliefs, and Spirituality - Sidney was raised Alevism and accepted the offer of prayer and communion. 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    Plan of Care - SH will follow while PT remains in hospital.    Pipo Kim  Associate   Pager: [phone]   "

## 2023-05-11 NOTE — PROGRESS NOTES
05/11/23 1038   Appointment Info   Signing Clinician's Name / Credentials (PT) Jhon Amezcua, PT, DPT   Rehab Comments (PT) spinal precautions   Living Environment   People in Home alone   Current Living Arrangements house   Home Accessibility no concerns   Transportation Anticipated family or friend will provide   Living Environment Comments Lives alone in apartment on first floor, no accessibility concerns. Brother will be assisting at home for first few days   Self-Care   Usual Activity Tolerance good   Current Activity Tolerance moderate   Equipment Currently Used at Home none   Fall history within last six months yes   Number of times patient has fallen within last six months 3  (R leg gave out going upstairs)   Activity/Exercise/Self-Care Comment Patient IND with mobility and cares at baseline, no assistive device. Works as a nurse and a paramedic   General Information   Onset of Illness/Injury or Date of Surgery 05/10/23   Referring Physician Dalia Patel, NP   Patient/Family Therapy Goals Statement (PT) wants to go home   Pertinent History of Current Problem (include personal factors and/or comorbidities that impact the POC) 57 year old male s/p L3-L4 TLIF POD #1   Existing Precautions/Restrictions fall;spinal   Cognition   Affect/Mental Status (Cognition) WNL   Orientation Status (Cognition) oriented x 4   Follows Commands (Cognition) WNL   Pain Assessment   Patient Currently in Pain Yes, see Vital Sign flowsheet  (4/10 LBP)   Range of Motion (ROM)   ROM Comment post-surgical spinal ROM limitations   Strength (Manual Muscle Testing)   Strength Comments R knee extension 4/5, L knee extension 5/5   Bed Mobility   Bed Mobility supine-sit;sit-supine   Supine-Sit [place] (Bed Mobility) contact guard   Sit-Supine [place] (Bed Mobility) contact guard   Comment, (Bed Mobility) supine<>sit transfers with CGA, logroll technique   Transfers   Transfers sit-stand transfer   Sit-Stand Transfer    Sit-Stand [place] (Transfers) contact guard   Assistive Device (Sit-Stand Transfers) walker, front-wheeled   Comment, (Sit-Stand Transfer) sit<>stand with FWW and CGA   Gait/Stairs (Locomotion)   [place] Level (Gait) contact guard   Assistive Device (Gait) walker, front-wheeled   Distance in Feet 10' eval   Distance in Feet (Gait) 150'   Comment, (Gait/Stairs) ambulating with FWW and CGA for 10', no instability or LOB   Balance   Balance Comments good standing balance with BUE support on walker   Sensory Examination   Sensory Perception patient reports no sensory changes   Clinical Impression   Criteria for Skilled Therapeutic Intervention Yes, treatment indicated   PT Diagnosis (PT) impaired mobility   Influenced by the following impairments reduced activity tolerance, impaired strength and spinal mobility following surgery,   Functional limitations due to impairments impaired functional mobility, impaired gait, transfers, bed mobility, stair navigation   Clinical Presentation (PT Evaluation Complexity) Stable/Uncomplicated   Clinical Presentation Rationale clinical judgement   Clinical Decision Making (Complexity) low complexity   Planned Therapy Interventions (PT) balance training;bed mobility training;gait training;orthotic fitting/training;patient/family education;stair training;strengthening;transfer training;progressive activity/exercise   Anticipated Equipment Needs at Discharge (PT) walker, rolling   Risk & Benefits of therapy have been explained evaluation/treatment results reviewed;care plan/treatment goals reviewed;risks/benefits reviewed;current/potential barriers reviewed;participants voiced agreement with care plan;participants included;patient   PT Total Evaluation Time   PT Eval, Low Complexity Minutes (65802) 8   Plan of Care Review   Plan of Care Reviewed With patient   Physical Therapy Goals   PT Frequency 2x/day   PT Predicted Duration/Target Date for Goal Attainment 05/16/23   PT  Goals Bed Mobility;Transfers;Gait;Stairs   PT: Bed Mobility Modified independent;Supine to/from sit   PT: Transfers Modified independent;Sit to/from stand;Assistive device   PT: Gait Greater than 200 feet;Rolling walker;Supervision/stand-by assist   PT: Stairs Supervision/stand-by assist;2 stairs;Rail on both sides   Interventions   Interventions Quick Adds Gait Training;Therapeutic Activity   Therapeutic Activity   Therapeutic Activities: dynamic activities to improve functional performance Minutes (11510) 8   Symptoms Noted During/After Treatment Fatigue;Increased pain   Treatment Detail/Skilled Intervention Patient supine in bed at beginning of session, agreeable to participate in PT. Patient given proper posture and body mechanics handout at beginning of session, educated patient on spinal precautions. Educated on logroll technique. Performing logroll technique with CGA and bed flat, use of bedrails. Cues for sequencing. Patient performing sit<>stand transfers with FWW and CGA. Noting mild lightheadedness, BP checked at 125/70 mmHg. Stand to sit with CGA and bed flat. Patient sidelying in bed at end of session with all needs within reach. Nurse present.   Gait Training   Gait Training Minutes (65604) 8   Symptoms Noted During/After Treatment (Gait Training) fatigue;increased pain  (lightheadedness)   Treatment Detail/Skilled Intervention Patient completing bout of ambulation for 150' with FWW and CGA. Reporting mild lightheadedness while ambulating. Good step length and gait velocity. No instability or overt LOB throughout.   PT Discharge Planning   PT Plan progress gait distance, repeated sit<>stands, bed mobility with logroll, LE strengthening, stairs if needed   PT Discharge Recommendation (DC Rec) home with assist   PT Rationale for DC Rec Patient below baseline functional status. Limited by pain and spinal precautions following spinal surgery. Lives in accessible apartment with no stairs. Normally lives  alone but stated that his brother would be staying with him upon discharge. Recommend discharge home with supervision and assist for mobility and cares.   PT Brief overview of current status CGA for all mobility with FWW   Total Session Time   Timed Code Treatment Minutes 16   Total Session Time (sum of timed and untimed services) 24

## 2023-05-11 NOTE — PROGRESS NOTES
[facility]  Neurosurgery Daily Progress Note    Assessment & Plan   Procedure(s):  Lumbar 3 to Lumbar 4 Extension Transforaminal Lumbar Interbody Fusion   -1 Day Post-Op  Patient reports continued back and right leg pain/tingling, managed with medications. Denies any new or worsening symptoms. Incision CDI. JASMEET drain with 150ml output overnight. Hypotensive overnight, improved with fluids. Hemoglobin 10.7.     Plan:  - Continue pain control measures  - PT/OT  - Continue JASMEET drain and antibiotics    - Routine wound care  - Continue supportive and symptomatic treatment     Rhea Hyde CNP  [facility] Neurosurgery  [facility]  [address]  [place] [address]  Tel [phone]  Pager [phone]    Interval History   Continued back and right leg pain     Physical Exam   Temp: 98.6  F (37  C) Temp src: Oral BP: 110/70 Pulse: 74   Resp: 16 SpO2: 97 % O2 Device: None (Room air) Oxygen Delivery: 2 LPM  Vitals:    05/10/23 0735   Weight: 74.4 kg (164 lb)       Mental status:  Alert and oriented x 3, speech is fluent.  Motor:   Iliopsoas  (hip flexion)               Right: 5/5  Left:  5/5  Quadriceps  (knee extension)       Right:  5/5  Left:  5/5  Hamstrings  (knee flexion)            Right:  5/5  Left:  5/5  Gastroc Soleus  (PF)                          Right:  5/5  Left:  5/5  Tibialis Ant  (DF)                          Right:  5/5  Left:  5/5  EHL                          Right:  5/5  Left:  5/5    Sensation:  BLE intact to light touch   Incision: CDI     Medications     sodium chloride Stopped (05/11/23 1043)        acetaminophen  975 mg Oral Q8H     ceFAZolin  1 g Intravenous Q8H     escitalopram  20 mg Oral Daily     polyethylene glycol  17 g Oral Daily     senna-docusate  1 tablet Oral BID     sodium chloride (PF)  3 mL Intracatheter Q8H

## 2023-05-11 NOTE — PROVIDER NOTIFICATION
"To Rhea neurosurg via [facility] [phone] JM  POD 1 Pt reports worsening in \"stabbing pain\" in R hip and R thigh. He had less of this pain prior to surgery. Pt still has same numbness. CMS intact. Please advise, thanks.   Karen [phone]    Provider put in orders  "

## 2023-05-12 ENCOUNTER — APPOINTMENT (OUTPATIENT)
Dept: PHYSICAL THERAPY | Facility: CLINIC | Age: 58
DRG: 455 | End: 2023-05-12
Attending: NEUROLOGICAL SURGERY
Payer: OTHER MISCELLANEOUS

## 2023-05-12 ENCOUNTER — APPOINTMENT (OUTPATIENT)
Dept: OCCUPATIONAL THERAPY | Facility: CLINIC | Age: 58
DRG: 455 | End: 2023-05-12
Attending: NURSE PRACTITIONER
Payer: OTHER MISCELLANEOUS

## 2023-05-12 LAB — GLUCOSE BLDC GLUCOMTR-MCNC: 131 MG/DL (ref 70–99)

## 2023-05-12 PROCEDURE — 97116 GAIT TRAINING THERAPY: CPT | Mod: GP | Performed by: PHYSICAL THERAPY ASSISTANT

## 2023-05-12 PROCEDURE — 250N000012 HC RX MED GY IP 250 OP 636 PS 637: Performed by: NURSE PRACTITIONER

## 2023-05-12 PROCEDURE — 250N000013 HC RX MED GY IP 250 OP 250 PS 637: Performed by: NURSE PRACTITIONER

## 2023-05-12 PROCEDURE — 97530 THERAPEUTIC ACTIVITIES: CPT | Mod: GP | Performed by: PHYSICAL THERAPY ASSISTANT

## 2023-05-12 PROCEDURE — 250N000013 HC RX MED GY IP 250 OP 250 PS 637: Performed by: PHYSICIAN ASSISTANT

## 2023-05-12 PROCEDURE — 120N000001 HC R&B MED SURG/OB

## 2023-05-12 PROCEDURE — 97535 SELF CARE MNGMENT TRAINING: CPT | Mod: GO

## 2023-05-12 PROCEDURE — 97165 OT EVAL LOW COMPLEX 30 MIN: CPT | Mod: GO

## 2023-05-12 PROCEDURE — 250N000011 HC RX IP 250 OP 636: Performed by: NURSE PRACTITIONER

## 2023-05-12 RX ADMIN — METHYLPREDNISOLONE 8 MG: 4 TABLET ORAL at 21:20

## 2023-05-12 RX ADMIN — CEFAZOLIN 1 G: 1 INJECTION, POWDER, FOR SOLUTION INTRAMUSCULAR; INTRAVENOUS at 12:23

## 2023-05-12 RX ADMIN — SENNOSIDES AND DOCUSATE SODIUM 1 TABLET: 50; 8.6 TABLET ORAL at 08:47

## 2023-05-12 RX ADMIN — OXYCODONE HYDROCHLORIDE 10 MG: 5 TABLET ORAL at 06:52

## 2023-05-12 RX ADMIN — ALPRAZOLAM 1 MG: 0.25 TABLET ORAL at 21:22

## 2023-05-12 RX ADMIN — HYDROXYZINE HYDROCHLORIDE 25 MG: 25 TABLET, FILM COATED ORAL at 01:35

## 2023-05-12 RX ADMIN — METHYLPREDNISOLONE 4 MG: 4 TABLET ORAL at 16:27

## 2023-05-12 RX ADMIN — ACETAMINOPHEN 975 MG: 325 TABLET ORAL at 00:22

## 2023-05-12 RX ADMIN — METHYLPREDNISOLONE 4 MG: 4 TABLET ORAL at 12:20

## 2023-05-12 RX ADMIN — OXYCODONE HYDROCHLORIDE 10 MG: 5 TABLET ORAL at 21:20

## 2023-05-12 RX ADMIN — HYDROXYZINE HYDROCHLORIDE 25 MG: 25 TABLET, FILM COATED ORAL at 10:53

## 2023-05-12 RX ADMIN — TIZANIDINE 4 MG: 2 TABLET ORAL at 06:53

## 2023-05-12 RX ADMIN — OXYCODONE HYDROCHLORIDE 10 MG: 5 TABLET ORAL at 10:53

## 2023-05-12 RX ADMIN — ACETAMINOPHEN 975 MG: 325 TABLET ORAL at 08:46

## 2023-05-12 RX ADMIN — CEFAZOLIN 1 G: 1 INJECTION, POWDER, FOR SOLUTION INTRAMUSCULAR; INTRAVENOUS at 21:20

## 2023-05-12 RX ADMIN — TIZANIDINE 4 MG: 2 TABLET ORAL at 15:51

## 2023-05-12 RX ADMIN — OXYCODONE HYDROCHLORIDE 10 MG: 5 TABLET ORAL at 01:35

## 2023-05-12 RX ADMIN — CEFAZOLIN 1 G: 1 INJECTION, POWDER, FOR SOLUTION INTRAMUSCULAR; INTRAVENOUS at 04:29

## 2023-05-12 RX ADMIN — POLYETHYLENE GLYCOL 3350 17 G: 17 POWDER, FOR SOLUTION ORAL at 08:46

## 2023-05-12 RX ADMIN — OXYCODONE HYDROCHLORIDE 10 MG: 5 TABLET ORAL at 15:51

## 2023-05-12 RX ADMIN — ESCITALOPRAM OXALATE 20 MG: 20 TABLET, FILM COATED ORAL at 08:46

## 2023-05-12 RX ADMIN — METHYLPREDNISOLONE 4 MG: 4 TABLET ORAL at 10:08

## 2023-05-12 RX ADMIN — SENNOSIDES AND DOCUSATE SODIUM 1 TABLET: 50; 8.6 TABLET ORAL at 21:20

## 2023-05-12 RX ADMIN — ACETAMINOPHEN 975 MG: 325 TABLET ORAL at 15:51

## 2023-05-12 ASSESSMENT — ACTIVITIES OF DAILY LIVING (ADL)
ADLS_ACUITY_SCORE: 30
ADLS_ACUITY_SCORE: 34
ADLS_ACUITY_SCORE: 30
ADLS_ACUITY_SCORE: 30
PREVIOUS_RESPONSIBILITIES: MEAL PREP;HOUSEKEEPING;LAUNDRY;SHOPPING;DRIVING
ADLS_ACUITY_SCORE: 30
ADLS_ACUITY_SCORE: 34

## 2023-05-12 NOTE — PROGRESS NOTES
05/12/23 0919   Appointment Info   Signing Clinician's Name / Credentials (OT) Melani Patton OTR/L   Living Environment   People in Home alone   Current Living Arrangements house   Home Accessibility no concerns   Living Environment Comments Lives alone in apartment on first floor, no accessibility concerns. Brother will be assisting at home for first few days. Per EMR, pt recently .   Self-Care   Usual Activity Tolerance good   Current Activity Tolerance poor   Equipment Currently Used at Home shower chair  (tub/shower. standard height toilet)   Fall history within last six months yes   Number of times patient has fallen within last six months 3   Activity/Exercise/Self-Care Comment Patient IND with mobility and cares at baseline, no assistive device. Per physical therapy note, recently retired from being a nurse and paramedic   Instrumental Activities of Daily Living (IADL)   Previous Responsibilities meal prep;housekeeping;laundry;shopping;driving   General Information   Onset of Illness/Injury or Date of Surgery 05/10/23   Referring Physician Dalia Patel, NP   Patient/Family Therapy Goal Statement (OT) Improve pain control   Additional Occupational Profile Info/Pertinent History of Current Problem s/p Lumbar 3 to Lumbar 4 Extension Transforaminal Lumbar Interbody Fusion. Had prior lumbar fusion in 2022   Existing Precautions/Restrictions fall;spinal   Cognitive Status Examination   Affect/Mental Status (Cognitive) sad/depressed affect   Follows Commands follows two-step commands   Sensory   Sensory Comments Pt reports reduced sensation in RLE   Pain Assessment   Patient Currently in Pain Yes, see Vital Sign flowsheet   Strength Comprehensive (MMT)   Comment, General Manual Muscle Testing (MMT) Assessment Functional strength in BUEs. Refer to PT note for BLE strength   Transfers   Transfers toilet transfer;shower transfer   Shower Transfer   [place] Level (Shower Transfer) moderate  assist (50% patient effort)   Toilet Transfer   [place] Level (Toilet Transfer) moderate assist (50% patient effort)   Activities of Daily Living   BADL Assessment/Intervention lower body dressing;toileting;bathing;grooming   Bathing Assessment/Intervention   [place] Level (Bathing) moderate assist (50% patient effort)  (per clinical judgment)   Lower Body Dressing Assessment/Training   [place] Level (Lower Body Dressing) moderate assist (50% patient effort)   Grooming Assessment/Training   [place] Level (Grooming) minimum assist (75% patient effort)   Toileting   [place] Level (Toileting) minimum assist (75% patient effort)   Clinical Impression   Criteria for Skilled Therapeutic Interventions Met (OT) Yes, treatment indicated   OT Diagnosis Decline function   OT Problem List-Impairments impacting ADL activity tolerance impaired;pain;post-surgical precautions;mobility;balance   Assessment of Occupational Performance 5 or more Performance Deficits   Identified Performance Deficits LB dressing, toileting, bathing, grooming, functional mobility, strenuous IADLs   Planned Therapy Interventions (OT) ADL retraining;IADL retraining;transfer training;home program guidelines;progressive activity/exercise   Clinical Decision Making Complexity (OT) low complexity   Anticipated Equipment Needs Upon Discharge (OT)   (reacher, sock aid, long shoe horn)   Risk & Benefits of therapy have been explained evaluation/treatment results reviewed;care plan/treatment goals reviewed;risks/benefits reviewed;current/potential barriers reviewed;participants voiced agreement with care plan;participants included;patient   OT Total Evaluation Time   OT Eval, Low Complexity Minutes (21843) 8   OT Goals   Therapy Frequency (OT) Daily   OT Predicted Duration/Target Date for Goal Attainment 05/15/23   OT Goals Hygiene/Grooming;Lower Body Dressing;Toilet Transfer/Toileting;Transfers   OT: Hygiene/Grooming modified  independent;within precautions   OT: Lower Body Dressing Supervision/stand-by assist;within precautions;using adaptive equipment;including set-up/clothing retrieval   OT: Transfer Supervision/stand-by assist  (Pt will verbalize understanding of recommended tub/shower transfer)   OT: Toilet Transfer/Toileting Modified independent;toilet transfer;cleaning and garment management;using adaptive equipment;within precautions   Interventions   Interventions Quick Adds Self-Care/Home Management   Self-Care/Home Management   Self-Care/Home Mgmt/ADL, Compensatory, Meal Prep Minutes (07616) 10   Symptoms Noted During/After Treatment (Meal Preparation/Planning Training) increased pain   Treatment Detail/Skilled Intervention Upon arrival pt semi-supine in bed with lights dark. Pt with flat affect. Pt reporting very high level of pain. Pt groaning and grimacing intermittently during session and needing to frequently change position. Reports he has ordered breakfast then will take narcotic. Pt has some familiarity with spine precautions as he had fusion in 2022. Pt participated in education and simulated demonstration regarding use of sock aid, reacher, and long shoe horn for donning socks/shoes. Pt donned sock with [place]. Pt declined OOB activity due to high pain level. Encouraged to take narcotics after breakfast and then participate in AM PT. Pt reports he has work comp. Spoke with . DME order entered for sock aid, reacher, long shoe horn. Pt issued list of medical supply stores. Pt setup with breakfast tray. Pt participated in education regarding benefit of toilet seat riser and grab bars; reports no need for one at home.   OT Discharge Planning   OT Plan LB dress using AE. Toilet transfer. Educate in tub/shower transfer   OT Discharge Recommendation (DC Rec) home with assist   OT Rationale for DC Rec Pt is familiar with recovery process as he had lumbar fusion in 2022. Unlike prior surgery, pt now living alone. Pt  having high level of pain. If pain improves and pt progresses as anticipated, anticipate pt will discharge home alone with initial assist from brother with strenuous IADLs (cooking, cleaning, laundry, shopping, etc). If pt progresses slower than anticipated he may benefit from TCU.   OT Brief overview of current status Educated in LB dressing adaptive equipment. High pain level.   Total Session Time   Timed Code Treatment Minutes 10   Total Session Time (sum of timed and untimed services) 18

## 2023-05-12 NOTE — PROGRESS NOTES
Patient A&Ox4. Incision dressing CDI, JASMEET in place.  Pt stated tingling, numbness & pain still present from R hip to R knee, Neurosurgeon aware.  Patient vital signs are at baseline: Yes RA  Patient able to ambulate as they were prior to admission or with assist devices provided by therapies during their stay:  Yes ambulated in the hallway Assist of 1 GB/W  Patient MUST void prior to discharge:  Yes Via urinal/BR  Patient able to tolerate oral intake:  Yes  Pain has adequate pain control using Oral analgesics:  Yes  Does patient have an identified :  No,  Reason:  TBD  Has goal D/C date and time been discussed with patient:  No,  Reason:  TBD

## 2023-05-12 NOTE — PROGRESS NOTES
[facility]  Neurosurgery Daily Progress Note    Assessment & Plan   Procedure(s):  Lumbar 3 to Lumbar 4 Extension Transforaminal Lumbar Interbody Fusion   -2 Days Post-Op  Patient reports continued pain in low back and right thigh. Pain medications have been helpful. Medrol dosepak started yesterday. Denies any new or worsening symptoms. Incision CDI. JASMEET drain with 70ml output overnight.    Plan:  - Continue pain control measures and Medrol Dosepak taper   - PT/OT  - Continue JASMEET drain and antibiotics    - Routine wound care  - Likely discharge in 1-2 days pending JASMEET drain output and overall progress     Rhea Hyde CNP  [facility] Neurosurgery  [facility]  [address]  [place] [address]  Tel [phone]  Pager [phone]    Interval History   Back and right leg pain. Medrol dosepak started yesterday.     Physical Exam   Temp: 98.2  F (36.8  C) Temp src: Oral BP: 136/88 Pulse: 70   Resp: 18 SpO2: 95 % O2 Device: None (Room air)    Vitals:    05/10/23 0735   Weight: 74.4 kg (164 lb)       Mental status:  Alert and oriented x 3, speech is fluent.  Motor:   Iliopsoas  (hip flexion)               Right: 5/5  Left:  5/5  Quadriceps  (knee extension)       Right:  5/5  Left:  5/5  Hamstrings  (knee flexion)            Right:  5/5  Left:  5/5  Gastroc Soleus  (PF)                          Right:  5/5  Left:  5/5  Tibialis Ant  (DF)                          Right:  5/5  Left:  5/5  EHL                          Right:  5/5  Left:  5/5    Sensation:  BLE intact to light touch   Incision: CDI     Medications     sodium chloride Stopped (05/11/23 1043)        acetaminophen  975 mg Oral Q8H     ceFAZolin  1 g Intravenous Q8H     escitalopram  20 mg Oral Daily     methylPREDNISolone  8 mg Oral At Bedtime    And     methylPREDNISolone  4 mg Oral QAM AC    And     methylPREDNISolone  4 mg Oral Daily with lunch    And     methylPREDNISolone  4 mg Oral Daily  with supper    And     [START ON 5/13/2023] methylPREDNISolone  4 mg Oral At Bedtime     polyethylene glycol  17 g Oral Daily     senna-docusate  1 tablet Oral BID     sodium chloride (PF)  3 mL Intracatheter Q8H

## 2023-05-12 NOTE — PLAN OF CARE
Goal Outcome Evaluation:    A&Ox4. VSS on RA. Back dressing CDI. Pt stated tingling, numbness & pain still present from R hip to R knee. No change at this time. Pain managed with oxycodone, atarax, tylenol & zanaflex. JASMEET drain intact & patent. IV SL with intermittent IV abx. Up with SBA to BR. Voiding adequately. Will continue to monitor.

## 2023-05-13 ENCOUNTER — APPOINTMENT (OUTPATIENT)
Dept: OCCUPATIONAL THERAPY | Facility: CLINIC | Age: 58
DRG: 455 | End: 2023-05-13
Attending: NEUROLOGICAL SURGERY
Payer: OTHER MISCELLANEOUS

## 2023-05-13 ENCOUNTER — APPOINTMENT (OUTPATIENT)
Dept: PHYSICAL THERAPY | Facility: CLINIC | Age: 58
DRG: 455 | End: 2023-05-13
Attending: NEUROLOGICAL SURGERY
Payer: OTHER MISCELLANEOUS

## 2023-05-13 PROCEDURE — 250N000013 HC RX MED GY IP 250 OP 250 PS 637: Performed by: PHYSICIAN ASSISTANT

## 2023-05-13 PROCEDURE — 250N000011 HC RX IP 250 OP 636: Performed by: NURSE PRACTITIONER

## 2023-05-13 PROCEDURE — 250N000012 HC RX MED GY IP 250 OP 636 PS 637: Performed by: NURSE PRACTITIONER

## 2023-05-13 PROCEDURE — 97116 GAIT TRAINING THERAPY: CPT | Mod: GP

## 2023-05-13 PROCEDURE — 120N000001 HC R&B MED SURG/OB

## 2023-05-13 PROCEDURE — 250N000013 HC RX MED GY IP 250 OP 250 PS 637: Performed by: NURSE PRACTITIONER

## 2023-05-13 PROCEDURE — 97535 SELF CARE MNGMENT TRAINING: CPT | Mod: GO | Performed by: OCCUPATIONAL THERAPIST

## 2023-05-13 RX ADMIN — ESCITALOPRAM OXALATE 20 MG: 20 TABLET, FILM COATED ORAL at 08:37

## 2023-05-13 RX ADMIN — METHYLPREDNISOLONE 4 MG: 4 TABLET ORAL at 17:21

## 2023-05-13 RX ADMIN — METHYLPREDNISOLONE 4 MG: 4 TABLET ORAL at 13:20

## 2023-05-13 RX ADMIN — HYDROXYZINE HYDROCHLORIDE 25 MG: 25 TABLET, FILM COATED ORAL at 13:16

## 2023-05-13 RX ADMIN — POLYETHYLENE GLYCOL 3350 17 G: 17 POWDER, FOR SOLUTION ORAL at 08:36

## 2023-05-13 RX ADMIN — ALPRAZOLAM 1 MG: 0.25 TABLET ORAL at 21:13

## 2023-05-13 RX ADMIN — OXYCODONE HYDROCHLORIDE 10 MG: 5 TABLET ORAL at 04:51

## 2023-05-13 RX ADMIN — METHYLPREDNISOLONE 4 MG: 4 TABLET ORAL at 08:36

## 2023-05-13 RX ADMIN — OXYCODONE HYDROCHLORIDE 10 MG: 5 TABLET ORAL at 21:13

## 2023-05-13 RX ADMIN — SENNOSIDES AND DOCUSATE SODIUM 1 TABLET: 50; 8.6 TABLET ORAL at 08:37

## 2023-05-13 RX ADMIN — METHYLPREDNISOLONE 4 MG: 4 TABLET ORAL at 21:15

## 2023-05-13 RX ADMIN — OXYCODONE HYDROCHLORIDE 10 MG: 5 TABLET ORAL at 17:24

## 2023-05-13 RX ADMIN — ALPRAZOLAM 1 MG: 0.25 TABLET ORAL at 13:20

## 2023-05-13 RX ADMIN — CEFAZOLIN 1 G: 1 INJECTION, POWDER, FOR SOLUTION INTRAMUSCULAR; INTRAVENOUS at 21:15

## 2023-05-13 RX ADMIN — HYDROMORPHONE HYDROCHLORIDE 0.4 MG: 0.2 INJECTION, SOLUTION INTRAMUSCULAR; INTRAVENOUS; SUBCUTANEOUS at 14:45

## 2023-05-13 RX ADMIN — TIZANIDINE 4 MG: 2 TABLET ORAL at 17:24

## 2023-05-13 RX ADMIN — CEFAZOLIN 1 G: 1 INJECTION, POWDER, FOR SOLUTION INTRAMUSCULAR; INTRAVENOUS at 04:49

## 2023-05-13 RX ADMIN — CEFAZOLIN 1 G: 1 INJECTION, POWDER, FOR SOLUTION INTRAMUSCULAR; INTRAVENOUS at 13:17

## 2023-05-13 RX ADMIN — OXYCODONE HYDROCHLORIDE 10 MG: 5 TABLET ORAL at 13:16

## 2023-05-13 RX ADMIN — ACETAMINOPHEN 975 MG: 325 TABLET ORAL at 08:37

## 2023-05-13 ASSESSMENT — ACTIVITIES OF DAILY LIVING (ADL)
ADLS_ACUITY_SCORE: 34
ADLS_ACUITY_SCORE: 30
ADLS_ACUITY_SCORE: 34
ADLS_ACUITY_SCORE: 34
ADLS_ACUITY_SCORE: 30
ADLS_ACUITY_SCORE: 30
ADLS_ACUITY_SCORE: 34
ADLS_ACUITY_SCORE: 34
ADLS_ACUITY_SCORE: 30
ADLS_ACUITY_SCORE: 30

## 2023-05-13 NOTE — PROGRESS NOTES
Patient A&Ox4. CMS intact ex of N/T on the R thigh Neurosurgery aware. Incision DCI, JASMEET in place with minimum out put. x2 PIV SL    Patient vital signs are at baseline: Yes on RA   Patient able to ambulate as they were prior to admission or with assist devices provided by therapies during their stay:  Yes, up SBA in the hallway, independent in the room, pt refusing bed alarm.   Patient MUST void prior to discharge:  Yes via urinal/BR  Patient able to tolerate oral intake:  Yes  On regular dietPain has adequate pain control using Oral analgesics:  Yes PRN Oxycodone, Zaneflex PIV dilaudid, Xanax given x1 per pt request.  Does patient have an identified :  No,  Reason:  TBD  Has goal D/C date and time been discussed with patient:  No,  Reason:  TBD.

## 2023-05-13 NOTE — PLAN OF CARE
Occupational Therapy Discharge Summary    Reason for therapy discharge:    All goals and outcomes met, no further needs identified.    Progress towards therapy goal(s). See goals on Care Plan in [facility] electronic health record for goal details.  Goals met    Therapy recommendation(s):    No further therapy is recommended. Patient would benefit from assist with higher level IADLs at discharge until spinal precautions are lifted.

## 2023-05-13 NOTE — PROGRESS NOTES
[facility]  Neurosurgery Daily Progress Note    Assessment & Plan   Procedure(s):  Lumbar 3 to Lumbar 4 Extension Transforaminal Lumbar Interbody Fusion   -3 Days Post-Op  Patient reports continued pain in low back and right thigh. Pain medications have been helpful. Denies any new or worsening symptoms. Incision CDI. JASMEET drain with 40ml output overnight. We discussed post op nerve healing and pain management.     Plan:  - Continue pain control measures and Medrol Dosepak taper   - PT/OT  - Continue JASMEET drain and antibiotics    - Routine wound care  - Likely discharge in 1-2 days pending JASMEET drain output and overall progress     Rhea Hyde CNP  [facility] Neurosurgery  [facility]  [address]  [place] [address]  Tel [phone]  Pager [phone]    Interval History   Continued low back and right thigh pain    Physical Exam   Temp: 98.3  F (36.8  C) Temp src: Oral BP: 130/81 Pulse: 66   Resp: 16 SpO2: 95 % O2 Device: None (Room air)    Vitals:    05/10/23 0735   Weight: 74.4 kg (164 lb)       Mental status:  Alert and oriented x 3, speech is fluent.  Motor:   Iliopsoas  (hip flexion)               Right: 5/5  Left:  5/5  Quadriceps  (knee extension)       Right:  5/5  Left:  5/5  Hamstrings  (knee flexion)            Right:  5/5  Left:  5/5  Gastroc Soleus  (PF)                          Right:  5/5  Left:  5/5  Tibialis Ant  (DF)                          Right:  5/5  Left:  5/5  EHL                          Right:  5/5  Left:  5/5    Sensation:  BLE intact to light touch   Incision: CDI     Medications     sodium chloride Stopped (05/11/23 1043)        ceFAZolin  1 g Intravenous Q8H     escitalopram  20 mg Oral Daily     methylPREDNISolone  4 mg Oral QAM AC    And     methylPREDNISolone  4 mg Oral Daily with lunch    And     methylPREDNISolone  4 mg Oral Daily with supper    And     methylPREDNISolone  4 mg Oral At Bedtime     polyethylene  glycol  17 g Oral Daily     senna-docusate  1 tablet Oral BID     sodium chloride (PF)  3 mL Intracatheter Q8H

## 2023-05-13 NOTE — PLAN OF CARE
Physical Therapy Discharge Summary    Reason for therapy discharge:    All goals and outcomes met, no further needs identified.    Progress towards therapy goal(s). See goals on Care Plan in [facility] electronic health record for goal details.  Goals met    Therapy recommendation(s):    Continued therapy is recommended.  Rationale/Recommendations:  recommend regular ambulation w/ or without device as tolerated. Independent w/ mobility.       Will need a FWW for home.

## 2023-05-14 PROCEDURE — 120N000001 HC R&B MED SURG/OB

## 2023-05-14 PROCEDURE — 250N000013 HC RX MED GY IP 250 OP 250 PS 637: Performed by: PHYSICIAN ASSISTANT

## 2023-05-14 PROCEDURE — 250N000012 HC RX MED GY IP 250 OP 636 PS 637: Performed by: NURSE PRACTITIONER

## 2023-05-14 PROCEDURE — 250N000013 HC RX MED GY IP 250 OP 250 PS 637: Performed by: NURSE PRACTITIONER

## 2023-05-14 PROCEDURE — 250N000011 HC RX IP 250 OP 636: Performed by: NURSE PRACTITIONER

## 2023-05-14 RX ADMIN — OXYCODONE HYDROCHLORIDE 10 MG: 5 TABLET ORAL at 22:40

## 2023-05-14 RX ADMIN — TIZANIDINE 4 MG: 2 TABLET ORAL at 21:16

## 2023-05-14 RX ADMIN — OXYCODONE HYDROCHLORIDE 10 MG: 5 TABLET ORAL at 08:51

## 2023-05-14 RX ADMIN — ALPRAZOLAM 1 MG: 0.25 TABLET ORAL at 13:43

## 2023-05-14 RX ADMIN — OXYCODONE HYDROCHLORIDE 10 MG: 5 TABLET ORAL at 13:16

## 2023-05-14 RX ADMIN — CEFAZOLIN 1 G: 1 INJECTION, POWDER, FOR SOLUTION INTRAMUSCULAR; INTRAVENOUS at 13:18

## 2023-05-14 RX ADMIN — HYDROXYZINE HYDROCHLORIDE 25 MG: 25 TABLET, FILM COATED ORAL at 18:21

## 2023-05-14 RX ADMIN — OXYCODONE HYDROCHLORIDE 10 MG: 5 TABLET ORAL at 18:21

## 2023-05-14 RX ADMIN — CEFAZOLIN 1 G: 1 INJECTION, POWDER, FOR SOLUTION INTRAMUSCULAR; INTRAVENOUS at 05:08

## 2023-05-14 RX ADMIN — CEFAZOLIN 1 G: 1 INJECTION, POWDER, FOR SOLUTION INTRAMUSCULAR; INTRAVENOUS at 21:16

## 2023-05-14 RX ADMIN — METHYLPREDNISOLONE 4 MG: 4 TABLET ORAL at 13:16

## 2023-05-14 RX ADMIN — ESCITALOPRAM OXALATE 20 MG: 20 TABLET, FILM COATED ORAL at 08:51

## 2023-05-14 RX ADMIN — TIZANIDINE 4 MG: 2 TABLET ORAL at 13:16

## 2023-05-14 RX ADMIN — HYDROXYZINE HYDROCHLORIDE 25 MG: 25 TABLET, FILM COATED ORAL at 08:51

## 2023-05-14 RX ADMIN — METHYLPREDNISOLONE 4 MG: 4 TABLET ORAL at 10:02

## 2023-05-14 RX ADMIN — ALPRAZOLAM 1 MG: 0.25 TABLET ORAL at 21:16

## 2023-05-14 RX ADMIN — METHYLPREDNISOLONE 4 MG: 4 TABLET ORAL at 21:16

## 2023-05-14 ASSESSMENT — ACTIVITIES OF DAILY LIVING (ADL)
ADLS_ACUITY_SCORE: 34

## 2023-05-14 NOTE — PROGRESS NOTES
Patient A&Ox4. Incision dressing CDI, JASMEET in place.  Pt stated tingling, numbness & pain still present from R hip to R knee, Neurosurgeon aware.  Patient vital signs are at baseline: Yes RA  Patient able to ambulate as they were prior to admission or with assist devices provided by therapies during their stay:  Yes ambulated in the hallway Assist of 1 GB/W  Patient MUST void prior to discharge:  Yes Via urinal/BR  Patient able to tolerate oral intake:  Yes  Pain has adequate pain control using Oral analgesics:  Yes  Does patient have an identified :  No,  Reason:  Yes  Has goal D/C date and time been discussed with patient:  No,  Reason:  Yes. Home tomorrow, but patient need assistance from  to set up transportation, pt was unable to find a ride from friends/family per Pt words.

## 2023-05-14 NOTE — PROGRESS NOTES
[facility]  Neurosurgery Daily Progress Note    Assessment & Plan   Procedure(s):  Lumbar 3 to Lumbar 4 Extension Transforaminal Lumbar Interbody Fusion   -4 Days Post-Op  Patient reports continued pain in low back and right thigh, managed with medications. Denies any new or worsening symptoms. Incision CDI. JASMEET drain with 30ml output overnight. We discussed post op nerve healing and pain management. PT approves discharge to home. Patient is coordinating a .     Plan:  - Remove JASMEET drain today  - Discharge home pending if patient has a  today vs tomorrow   - Continue pain control measures and Medrol Dosepak taper   - PT/OT  - Routine wound care  - Follow up with NSGY clinic at 2 weeks post op    Discussed with Dr. Chandrakant Hyde, CNP  [facility]  [address] [address]  Tel [phone]  Pager [phone]    Interval History   Improving     Physical Exam   Temp: 97.2  F (36.2  C) Temp src: Oral BP: (!) 141/92 Pulse: 70   Resp: 16 SpO2: 99 % O2 Device: None (Room air)    Vitals:    05/10/23 0735   Weight: 74.4 kg (164 lb)       Mental status:  Alert and oriented x 3, speech is fluent.  Motor:   Iliopsoas  (hip flexion)               Right: 5/5  Left:  5/5  Quadriceps  (knee extension)       Right:  5/5  Left:  5/5  Hamstrings  (knee flexion)            Right:  5/5  Left:  5/5  Gastroc Soleus  (PF)                          Right:  5/5  Left:  5/5  Tibialis Ant  (DF)                          Right:  5/5  Left:  5/5  EHL                          Right:  5/5  Left:  5/5    Sensation:  BLE intact to light touch   Incision: CDI     Medications     sodium chloride Stopped (05/11/23 1043)        ceFAZolin  1 g Intravenous Q8H     escitalopram  20 mg Oral Daily     methylPREDNISolone  4 mg Oral QAM AC    And     methylPREDNISolone  4 mg Oral Daily with lunch    And     methylPREDNISolone  4 mg Oral At  Bedtime     polyethylene glycol  17 g Oral Daily     senna-docusate  1 tablet Oral BID     sodium chloride (PF)  3 mL Intracatheter Q8H

## 2023-05-15 VITALS
TEMPERATURE: 97.7 F | HEIGHT: 70 IN | DIASTOLIC BLOOD PRESSURE: 75 MMHG | HEART RATE: 63 BPM | WEIGHT: 164 LBS | SYSTOLIC BLOOD PRESSURE: 128 MMHG | BODY MASS INDEX: 23.48 KG/M2 | RESPIRATION RATE: 16 BRPM | OXYGEN SATURATION: 97 %

## 2023-05-15 PROCEDURE — 250N000012 HC RX MED GY IP 250 OP 636 PS 637: Performed by: NURSE PRACTITIONER

## 2023-05-15 PROCEDURE — 250N000011 HC RX IP 250 OP 636: Performed by: NURSE PRACTITIONER

## 2023-05-15 PROCEDURE — 250N000013 HC RX MED GY IP 250 OP 250 PS 637: Performed by: NURSE PRACTITIONER

## 2023-05-15 RX ORDER — METHYLPREDNISOLONE 4 MG/1
4 TABLET ORAL DAILY
Qty: 3 TABLET | Refills: 0 | Status: SHIPPED | OUTPATIENT
Start: 2023-05-15

## 2023-05-15 RX ORDER — OXYCODONE HYDROCHLORIDE 5 MG/1
5-10 TABLET ORAL EVERY 4 HOURS PRN
Qty: 30 TABLET | Refills: 0 | Status: SHIPPED | OUTPATIENT
Start: 2023-05-15 | End: 2023-05-17

## 2023-05-15 RX ORDER — AMOXICILLIN 250 MG
1 CAPSULE ORAL 2 TIMES DAILY PRN
Qty: 60 TABLET | Refills: 0 | Status: SHIPPED | OUTPATIENT
Start: 2023-05-15

## 2023-05-15 RX ADMIN — OXYCODONE HYDROCHLORIDE 10 MG: 5 TABLET ORAL at 05:06

## 2023-05-15 RX ADMIN — OXYCODONE HYDROCHLORIDE 10 MG: 5 TABLET ORAL at 10:44

## 2023-05-15 RX ADMIN — TIZANIDINE 4 MG: 2 TABLET ORAL at 10:44

## 2023-05-15 RX ADMIN — CEFAZOLIN 1 G: 1 INJECTION, POWDER, FOR SOLUTION INTRAMUSCULAR; INTRAVENOUS at 05:06

## 2023-05-15 RX ADMIN — ESCITALOPRAM OXALATE 20 MG: 20 TABLET, FILM COATED ORAL at 07:50

## 2023-05-15 RX ADMIN — METHYLPREDNISOLONE 4 MG: 4 TABLET ORAL at 07:50

## 2023-05-15 ASSESSMENT — ACTIVITIES OF DAILY LIVING (ADL)
ADLS_ACUITY_SCORE: 30
ADLS_ACUITY_SCORE: 34

## 2023-05-15 NOTE — PLAN OF CARE
"Goal Outcome Evaluation:    Denies CP, SOB. Pt stated that his RLE numbness was \"improving\". All pt needs met at this time. Possible discharge home today. RN gave pt IS with verbal ed and he self administered up to 3000.     "

## 2023-05-15 NOTE — PLAN OF CARE
Goal Outcome Evaluation:  Denies CP, SOB. Author put a 4x4 gauze dressing on drain removal site per provider request. Incision GAL per order. All pt needs met at this time. PIV out. Pt left with all belongings. Pt numbness in RLE is improving per pt. Pt verbalized understanding to all discharge and med instructions.

## 2023-05-15 NOTE — DISCHARGE SUMMARY
Admit Date: 05/10/2023  Discharge Date:     PRIMARY DIAGNOSIS:  History of prior lumbar fusion with adjacent segment degeneration.    OPERATION PERFORMED:  Removal of L4-L5 pedicle screws, exploration of prior L4 through S1 fusion with an L3 through L5 posterior instrumentation and fusion performed by Dr. Stallings on 05/10/2023.    HOSPITAL COURSE:  The patient is a 57-year-old male who presented to the clinic with the above diagnosis in which surgical management was deemed appropriate and the patient was prepared for surgery on 05/10/2023 at which time he underwent the above-described procedure.  Please see the dictated operative report for further details.  He tolerated surgery well and there were no complications.    Postoperatively, he has made an adequate neurosurgical recovery.  He has remained afebrile with stable vital signs.  He is tolerating a regular diet without difficulty and ambulating well with the assistance of a walker.  He is ready to be discharged to home on 05/15/2023 in improved condition as compared to admission.    All instructions regarding diet, activity, wound care, bowel management and followup were given to the patient who was released in good condition.    DISCHARGE MEDICATIONS:    1.  Oxycodone 5 mg, instructed to take 1-2 tablets every 4 hours as needed for pain, #30, no refills.  2.  He has Zanaflex refills at home to take for his use.  3.  Methylprednisolone 4 mg for 3 days to continue on with his taper.   4.  Senna 8.6 mg # 50 instructed to take 1 tablet twice daily as needed for constipation.    PLAN:  Follow up in Neurosurgery Clinic ,  and 08/10/2023.  As always, he was instructed to call or return to the clinic for any worsening or changes in symptoms.  Dictated with Dr. Stallings.    PRANEETH HOLM        D: 05/15/2023   T: 05/15/2023   MT: dick    Name:     KERRI GUERRA  MRN:      [id number]        Account:      [id number]   :      1965           Service  Date: 05/15/2023       Document: [id number]

## 2023-05-15 NOTE — PROGRESS NOTES
Pt A/Ox4, VSS on RA, dressing is CDI, PRN oxy & tizanadine for pain, xanax for anxiety, independent in room. Pt states he did find a ride home today, pending the time.

## 2023-05-16 ENCOUNTER — PATIENT OUTREACH (OUTPATIENT)
Dept: CARE COORDINATION | Facility: CLINIC | Age: 58
End: 2023-05-16
Payer: COMMERCIAL

## 2023-05-16 NOTE — PROGRESS NOTES
"Clinic Care Coordination Contact  [facility]: Post-Discharge Note  SITUATION                                                      Admission:    Admission Date: 05/10/23   Reason for Admission: Lumbar fusion  Discharge:   Discharge Date: 05/15/23  Discharge Diagnosis: Lumbar fusion    BACKGROUND                                                      Per hospital discharge summary and inpatient provider notes:    The patient is a 57-year-old male who presented to the clinic with the above diagnosis in which surgical management was deemed appropriate and the patient was prepared for surgery on 05/10/2023 at which time he underwent the above-described procedure.  Please see the dictated operative report for further details.  He tolerated surgery well and there were no complications.    ASSESSMENT           Discharge Assessment  How are you doing now that you are home?: \"I'm kind of having a lot of pain today\"  How are your symptoms? (Red Flag symptoms escalate to triage hotline per guidelines): Unchanged  Do you feel your condition is stable enough to be safe at home until your provider visit?: Yes  Does the patient have their discharge instructions? : Yes  Does the patient have questions regarding their discharge instructions? : No  Were you started on any new medications or were there changes to any of your previous medications? : Yes  Does the patient have all of their medications?: Yes  Do you have questions regarding any of your medications? : No  Do you have all of your needed medical supplies or equipment (DME)?  (i.e. oxygen tank, CPAP, cane, etc.): Yes                  PLAN                                                      Outpatient Plan:      Future Appointments   Date Time Provider Department [place]   5/26/2023  2:30 PM [facility]   6/22/2023  2:00 PM Dalia Patel NP [facility]   8/10/2023  2:00 PM Dalia Patel NP [facility] "         For any urgent concerns, please contact our 24 hour nurse triage line: [phone] ([phone])         Keara Sullivan  Community Health Worker  [facility] [place]  [phone]

## 2023-05-22 DIAGNOSIS — Z98.1 S/P LUMBAR SPINAL FUSION: ICD-10-CM

## 2023-05-22 DIAGNOSIS — M54.16 LUMBAR RADICULOPATHY: ICD-10-CM

## 2023-05-22 RX ORDER — OXYCODONE HYDROCHLORIDE 5 MG/1
5-10 TABLET ORAL EVERY 4 HOURS PRN
Qty: 40 TABLET | Refills: 0 | Status: SHIPPED | OUTPATIENT
Start: 2023-05-22 | End: 2023-05-26

## 2023-05-22 NOTE — TELEPHONE ENCOUNTER
Patient calling for a refill of Oxycodone      DOS: 5/10/23  Procedure: Lumbar 3 to Lumbar 4 Extension Transforaminal Lumbar Interbody Fusion  Surgeon: Dr Chandrakant Krause Post Op: 1 week, 5 days     Current symptom(s): Pain is all right side going to hip and down to almost the knee. It becomes serve Stabing pain to the right hip area when I try to get up from a rycliner and out of bed.   Advised to take 2 tabs every 4 hours instead of 1 every 6. This has provided better relief.   Hurts to lay on staples as they have started to come out on their own   Increase quantity to 40 due to increase in doses  Current pain management:   Oxycodone: 2 tablets every 4 hours as needed    Tizanidine: TID  [place] in [place]       Last fill: Oxy 5/19 #30  Next visit: 5/26     Medication pended for your approval, if appropriate. Pharmacy verified.      Any patient questions or concerns: patient reports staples on one side have started to come out.  Advised to send pictures.   Options: come to [place] for staple removal vs have removed by provider in [place] vs wait til Friday.   TBD     Informed patient request will be forwarded to care team.

## 2023-05-26 ENCOUNTER — ALLIED HEALTH/NURSE VISIT (OUTPATIENT)
Dept: NEUROSURGERY | Facility: CLINIC | Age: 58
End: 2023-05-26
Payer: OTHER MISCELLANEOUS

## 2023-05-26 VITALS — TEMPERATURE: 97.8 F

## 2023-05-26 DIAGNOSIS — Z98.1 S/P LUMBAR SPINAL FUSION: ICD-10-CM

## 2023-05-26 DIAGNOSIS — M54.16 LUMBAR RADICULOPATHY: ICD-10-CM

## 2023-05-26 PROCEDURE — 99207 PR NO CHARGE NURSE ONLY: CPT

## 2023-05-26 RX ORDER — OXYCODONE HYDROCHLORIDE 5 MG/1
5-10 TABLET ORAL EVERY 4 HOURS PRN
Qty: 40 TABLET | Refills: 0 | Status: SHIPPED | OUTPATIENT
Start: 2023-05-26 | End: 2023-05-30

## 2023-05-26 ASSESSMENT — PAIN SCALES - GENERAL: PAINLEVEL: MODERATE PAIN (4)

## 2023-05-26 NOTE — PATIENT INSTRUCTIONS
Instructions for Patient    smoking cessation    Incision  Steri-strips were applied today, they will fall off in 7-10 days. Do not to pull them off.   Keep your incision clean and dry at all times.   It is okay to shower, just pat the incision dry   No submerging incision in water such as pools, hot tubs, or baths for at least 8 weeks and until the incision is healed  Do not apply lotions or ointments to incision    Activity  No lifting greater than 10 pounds. No bending, twisting, or overhead reaching.  Walking is the best way to start exercise after surgery. Take short frequent walks. You may gradually increase the distance as tolerated. If you feel pain, decrease your activity, but DO NOT stop walking. Walking will help you gain strength, prevent muscle soreness and spasms, and prevent blood clots.   Avoid bed rest and prolonged sitting for longer than 30 minutes (change positions frequently while awake)  No contact sports or high impact activities such as; running/jogging, snowmobile or 4 lyles riding or any other recreational vehicles until after given clearance at one of your follow up visits    Medications   Refills of pain medication:   Please call the neurosurgery clinic to request 2-3 days before you run out  Weaning from narcotic pain medications  When it is time, start weaning by extending the time between doses.   For example, if you're taking 2 tabs every 4 hours, spread it out to 2 tabs over 4.5, 5, 6 hours. At that point you can certainly cut down to 1 tab, then wean to an as needed basis until completely done with them.Refills: call our clinic 2-3 business days before you are out of medication. A nurse will call you back to obtain a pain assessment.   Don't take more than 3000mg of Acetaminophen in 24 hours  Avoid Aspirin and NSAIDs (ex: ibuprofen/Advil) until given clearance (likely at the 6-week post-op appointment).  Encouraged icing for at least 3-4 times throughout the day for 20-30 minutes  at a time. Avoid heat to the incision area.   Taking stool softeners regularly can reduce constipation commonly caused by narcotic pain medications.    Contact clinic or Emergency Room if you develop:   Infection (redness, swelling, warmth, drainage, fever over 101 F)  New injury  Bladder or bowel changes or loss of control    Signs of blood clot:  Swelling and/or warmth in one or both legs  Pain or tenderness in your leg, ankle, foot, or arm   Red or discolored skin     Go to the Emergency Room   If sudden onset of severe headache, weakness, confusion, change in level of consciousness, pain, or loss of movement.  Chest pain  Trouble breathing     Post-operative appointments  Arrive 30 minutes before your 6 week, 3 month, 6 month, and 1 year post-op appointments to allow time for an x-ray before each    [facility]  [facility]  [address] [place] [address]  Telephone:  [phone]   Fax:  [phone]

## 2023-05-26 NOTE — PROGRESS NOTES
Post-op Nurse Visit:    Patient seen today per the order of  Demetri Stallings MD .   DOS: 5/10/23  Procedure: L3-4 extension TLIF    Pain/Neuro Assessment  4/10 to low back to right hip described as sharp/stabbing.   Numbness/tingling: denies   Strength: Equal strength to bilateral lower extremities. Denies weakness.     Smoking cessation: printout given and encouraged cessation    Pain Relief Measures:  Oxycodone: 2 tabs q4h -took last tab today and needs refill. Last refilled 5/22/23. Ok to fill.  Tylenol: rarely  tizanidine: TID  Ice: frequently     Incision   Incision inspected. Edges well-approximated. No redness, drainage, or warmth noted.slight swelling to superior portion of incision. Incision prepped with betadine and staple(s)  removed without difficulty. Steri-strips applied.    Activity  Following restrictions   Falls:  none  Patient is walking frequently without difficulty.   Denies redness, swelling, or warmth to bilateral calves.     GI/  Patient's appetite is normal  Bowel/bladder problems? No  Taking stool softeners? No     Refills/x-rays/return to work  Refills given at this appointment? Yes  Sent for x-rays after this appointment? No  Ordered future x-rays? Yes  Return to work discussed at this appointment? n/a     All of patient's questions addressed today. Patient was instructed to call with any additional questions/concerns.     Anabell Wang RN on 5/26/2023 at 2:51 PM

## 2023-05-27 ENCOUNTER — HEALTH MAINTENANCE LETTER (OUTPATIENT)
Age: 58
End: 2023-05-27

## 2023-05-30 DIAGNOSIS — Z98.1 S/P LUMBAR SPINAL FUSION: ICD-10-CM

## 2023-05-30 DIAGNOSIS — M54.16 LUMBAR RADICULOPATHY: ICD-10-CM

## 2023-05-30 RX ORDER — OXYCODONE HYDROCHLORIDE 5 MG/1
5-10 TABLET ORAL EVERY 4 HOURS PRN
Qty: 40 TABLET | Refills: 0 | Status: SHIPPED | OUTPATIENT
Start: 2023-05-30 | End: 2023-06-02

## 2023-05-30 NOTE — TELEPHONE ENCOUNTER
Patient calling for a refill of oxycodone.     DOS: 5/10/23  Procedure: L3-4 extension TLIF  Surgeon: Dr. Chandrakant Krause Post Op: 2 weeks 6 days    Current symptom(s): Pain is near patients incision and into his right hip. Pain is a 10/10 per patient report. Patient reports his hip pain started Friday afternoon, denied any new activity that could have caused this. He will monitor his right hip pain and let us know if it is worsening or not improving.   Current pain management: icing throughout the day, oxycodone 2 tabs Q4H, Zanaflex TID    Last fill: 5/26 #40  Next visit: 6/22    Medication pended for your approval, if appropriate. Pharmacy verified.     Any patient questions or concerns:     Informed patient request will be forwarded to care team.

## 2023-06-02 DIAGNOSIS — M54.16 LUMBAR RADICULOPATHY: ICD-10-CM

## 2023-06-02 DIAGNOSIS — Z98.1 S/P LUMBAR SPINAL FUSION: ICD-10-CM

## 2023-06-02 RX ORDER — OXYCODONE HYDROCHLORIDE 5 MG/1
5-10 TABLET ORAL EVERY 4 HOURS PRN
Qty: 40 TABLET | Refills: 0 | Status: SHIPPED | OUTPATIENT
Start: 2023-06-02 | End: 2023-06-02

## 2023-06-02 NOTE — TELEPHONE ENCOUNTER
Patient called to request refill of Oxycodone. Please call patient back at [phone]. Thank you~

## 2023-06-02 NOTE — CONFIDENTIAL NOTE
Attempted to reach out to patient, no answer. Left voice message for them to call clinic back to further discuss.      Patient calling for a refill of oxycodone.     DOS: 5/10/23  Procedure: L3-4 extension TLIF  Surgeon: Dr. Stallings  Weeks Post Op: 3 weeks 2 days    Current symptom(s):   Current pain management:   Oxycodone  Tizanidine  Ice  Tylenol    Last fill: 5/30 #40  Next visit: 6/22

## 2023-06-02 NOTE — CONFIDENTIAL NOTE
Patient calling for a refill of oxycodone.      DOS: 5/10/23  Procedure: L3-4 extension TLIF  Surgeon: Dr. Chandrakant Krause Post Op: 3 weeks 2 days     Current symptom(s): 5/10 at top of incision with swelling - patient will send a photo of incision for care team to review. Some swelling noted at 2 week visit per chart review. Hip pain improving. Denies new numbness, tingling, weakness.   Current pain management:   Oxycodone - 1-2 pills Q4 hours  Tizanidine - 1 pill TID  Ice - Yes  Tylenol - 1,000 mg TID     Last fill: 5/30 #40  Next visit: 6/22    Medication pended for your approval, if appropriate. Pharmacy verified.     Any patient questions or concerns:     Informed patient request will be forwarded to care team.

## 2023-06-08 DIAGNOSIS — Z98.1 S/P LUMBAR SPINAL FUSION: ICD-10-CM

## 2023-06-08 DIAGNOSIS — M54.16 LUMBAR RADICULOPATHY: ICD-10-CM

## 2023-06-08 RX ORDER — OXYCODONE HYDROCHLORIDE 5 MG/1
5-10 TABLET ORAL EVERY 4 HOURS PRN
Qty: 40 TABLET | Refills: 0 | Status: SHIPPED | OUTPATIENT
Start: 2023-06-08 | End: 2023-06-13

## 2023-06-08 NOTE — TELEPHONE ENCOUNTER
Patient calling for a refill of Oxycodone    DOS: 5/10/23  Procedure: Lumbar fusion  Surgeon:Dr Chandarkant Krause Post Op: 4    Current symptom(s): 9/10 LBP to right hip. Good days and bad days. Reported swelling on 6/2 has improved slightly. No new symptoms. Will send additional photos for review.   Current pain management:2 tabs of Oxy every 4 hours. Trying to wean.  Zanaflex TID, Tylenol, ice. Following restrictions     Last fill: 6/2/23  Next visit: 6/22/23    Medication pended for your approval, if appropriate. Pharmacy verified.     Any patient questions or concerns:     Informed patient request will be forwarded to care team.

## 2023-06-08 NOTE — TELEPHONE ENCOUNTER
Patient stated that his pharmacy is out of stock of Oxy- he would be willing to use Perocet if another pharmacy doesn't have the oxy.

## 2023-06-13 DIAGNOSIS — Z98.1 S/P LUMBAR SPINAL FUSION: ICD-10-CM

## 2023-06-13 DIAGNOSIS — M54.16 LUMBAR RADICULOPATHY: ICD-10-CM

## 2023-06-13 DIAGNOSIS — M48.061 SPINAL STENOSIS OF LUMBAR REGION WITH RADICULOPATHY: ICD-10-CM

## 2023-06-13 DIAGNOSIS — M54.16 SPINAL STENOSIS OF LUMBAR REGION WITH RADICULOPATHY: ICD-10-CM

## 2023-06-13 RX ORDER — TIZANIDINE 2 MG/1
2 TABLET ORAL 3 TIMES DAILY
Qty: 30 TABLET | Refills: 0 | Status: SHIPPED | OUTPATIENT
Start: 2023-06-13 | End: 2023-06-26

## 2023-06-13 RX ORDER — OXYCODONE HYDROCHLORIDE 5 MG/1
5-10 TABLET ORAL EVERY 4 HOURS PRN
Qty: 40 TABLET | Refills: 0 | Status: SHIPPED | OUTPATIENT
Start: 2023-06-13 | End: 2023-06-19

## 2023-06-13 NOTE — TELEPHONE ENCOUNTER
Patient calling for a refill of Oxycodone and Zanaflex.     DOS: 5/10/23  Procedure: L3 to L4 TLIF  Surgeon: Chandrakant  Weeks Post Op: 5    Current symptom(s):   Please rate your pain from 0-10, identify the location of your pain, and describe the quality of your pain (aching, throbbing, burning, shooting, etc.)  Cant sleep, R back pain to waist, 8/10  Are you experiencing any new numbness/tingling/pain down your arms/legs?  no  What are you taking for pain (how much and how often)?  Narcotic: 10 q4  Muscle relaxer: TID  Tylenol: 500-1000 x2  Ice: Intermittent   Heat: no  Are you following your activity limitations? Any new injuries?  Yes    Current pain management: Oxycodone 10mg q4, Zanaflex TID, intermittent ice. Following restrictions.    Last fill: Oxycodone 6/8/23 Zanaflex 5/4/23  Next visit: 6/22/23  Pharmacy: [facility]  Current Supply: Out of Oxycodone, 2 Zanaflex left    Medication pended for your approval, if appropriate. Pharmacy verified.     Any patient questions or concerns: No    Informed patient request will be forwarded to care team.

## 2023-06-13 NOTE — TELEPHONE ENCOUNTER
Patient called to request refills on both his pain medication and muscle relaxer. Please call him back to confirm at [phone]. Thank you

## 2023-06-19 ENCOUNTER — TELEPHONE (OUTPATIENT)
Dept: NEUROSURGERY | Facility: CLINIC | Age: 58
End: 2023-06-19

## 2023-06-19 DIAGNOSIS — Z98.1 S/P LUMBAR SPINAL FUSION: ICD-10-CM

## 2023-06-19 DIAGNOSIS — M54.16 LUMBAR RADICULOPATHY: ICD-10-CM

## 2023-06-19 RX ORDER — OXYCODONE HYDROCHLORIDE 5 MG/1
5-10 TABLET ORAL EVERY 4 HOURS PRN
Qty: 40 TABLET | Refills: 0 | Status: SHIPPED | OUTPATIENT
Start: 2023-06-19 | End: 2023-06-23

## 2023-06-19 NOTE — TELEPHONE ENCOUNTER
"Patient calling for a refill of oxycodone.     DOS: 5/10/23  Procedure: L3 to L4 TLIF  Surgeon: Chandrakant  Weeks Post Op: 5 weeks 5 days    Current symptom(s): pain is \"better today\", still at a 5-6/10. Pain is in his low back to right hip, it no longer goes down his right leg. No new symptoms   Current pain management: 1-2 tabs oxy Q4H, tylenol throughout the day, zanaflex TID    Last fill: 6/13 #40  Next visit: 6/22    Medication pended for your approval, if appropriate. Pharmacy verified.     Any patient questions or concerns:     Informed patient request will be forwarded to care team.     "

## 2023-06-22 ENCOUNTER — OFFICE VISIT (OUTPATIENT)
Dept: NEUROSURGERY | Facility: CLINIC | Age: 58
End: 2023-06-22
Payer: OTHER MISCELLANEOUS

## 2023-06-22 ENCOUNTER — ANCILLARY PROCEDURE (OUTPATIENT)
Dept: GENERAL RADIOLOGY | Facility: CLINIC | Age: 58
End: 2023-06-22
Attending: NURSE PRACTITIONER
Payer: COMMERCIAL

## 2023-06-22 VITALS
TEMPERATURE: 97.6 F | SYSTOLIC BLOOD PRESSURE: 120 MMHG | BODY MASS INDEX: 22.81 KG/M2 | WEIGHT: 159.3 LBS | HEIGHT: 70 IN | DIASTOLIC BLOOD PRESSURE: 70 MMHG

## 2023-06-22 DIAGNOSIS — Z98.1 S/P LUMBAR SPINAL FUSION: ICD-10-CM

## 2023-06-22 DIAGNOSIS — Z98.1 S/P LUMBAR SPINAL FUSION: Primary | ICD-10-CM

## 2023-06-22 PROCEDURE — 99024 POSTOP FOLLOW-UP VISIT: CPT | Performed by: NURSE PRACTITIONER

## 2023-06-22 PROCEDURE — 72100 X-RAY EXAM L-S SPINE 2/3 VWS: CPT | Mod: TC | Performed by: RADIOLOGY

## 2023-06-22 ASSESSMENT — PAIN SCALES - GENERAL: PAINLEVEL: MODERATE PAIN (5)

## 2023-06-22 NOTE — PATIENT INSTRUCTIONS
- May increase lifting restriction to 20 pounds with continued bending and twisting restrictions.   - Follow up in 6 weeks with lumbar xray prior.    - Call the clinic at [phone] for increased pain or any other questions and concerns.

## 2023-06-22 NOTE — PROGRESS NOTES
"[facility]  Neurosurgery Follow Up:    HPI: 6 weeks s/p L3-4 extension TLIF with Dr. Stallings on 5/10/2023. Doing well. Improvement in preoperative symptoms. He denies any radicular leg pain. He states the back pain intermittently radiates to the right side. He denies overt weakness. Incision CDI.    Medical, surgical, family, and social history unchanged since prior exam.  Exam:  Constitutional:  Alert, well nourished, NAD.  HEENT: Normocephalic, atraumatic.   Pulm:  Without shortness of breath   CV:  No pitting edema of BLE.      Vital Signs:  /70   Temp 97.6  F (36.4  C) (Temporal)   Ht 5' 10\" (1.778 m)   Wt 159 lb 4.8 oz (72.3 kg)   BMI 22.86 kg/m      Neurological:  Awake  Alert  Oriented x 3  Motor exam:        IP Q DF PF EHL  R   5  5   5   5    5  L   5  5   5   5    5     Able to spontaneously move L/E bilaterally  Sensation intact throughout all L/E dermatomes     Incisions:  Healing nicely.  Imaging: AP and lateral films reveal intact and stable hardware.     A/P: s/p lumbar spine fusion  Reviewed lumbar XR. Ok to increase activity to 20 pounds with continued bending and twisting restrictions. Follow up in 6 weeks as scheduled with lumbar xray prior. He verbalized understanding and agreement.  Patient Instructions   - May increase lifting restriction to 20 pounds with continued bending and twisting restrictions.   - Follow up in 6 weeks with lumbar xray prior.    - Call the clinic at [phone] for increased pain or any other questions and concerns.    Dalia Patel, MANAS  [facility]  [address] [place] [address]  [address] [place] [address]  Tel [phone]  Fax [phone]    "

## 2023-06-22 NOTE — LETTER
"    6/22/2023         RE: Waqas Lechuga  [address]  [facility] [address]        Dear Colleague,    Thank you for referring your patient, Waqas Lechuga, to the [facility] [place]. Please see a copy of my visit note below.    [facility]  Neurosurgery Follow Up:    HPI: 6 weeks s/p L3-4 extension TLIF with Dr. Stallings on 5/10/2023. Doing well. Improvement in preoperative symptoms. He denies any radicular leg pain. He states the back pain intermittently radiates to the right side. He denies overt weakness. Incision CDI.    Medical, surgical, family, and social history unchanged since prior exam.  Exam:  Constitutional:  Alert, well nourished, NAD.  HEENT: Normocephalic, atraumatic.   Pulm:  Without shortness of breath   CV:  No pitting edema of BLE.      Vital Signs:  /70   Temp 97.6  F (36.4  C) (Temporal)   Ht 5' 10\" (1.778 m)   Wt 159 lb 4.8 oz (72.3 kg)   BMI 22.86 kg/m      Neurological:  Awake  Alert  Oriented x 3  Motor exam:        IP Q DF PF EHL  R   5  5   5   5    5  L   5  5   5   5    5     Able to spontaneously move L/E bilaterally  Sensation intact throughout all L/E dermatomes     Incisions:  Healing nicely.  Imaging: AP and lateral films reveal intact and stable hardware.     A/P: s/p lumbar spine fusion  Reviewed lumbar XR. Ok to increase activity to 20 pounds with continued bending and twisting restrictions. Follow up in 6 weeks as scheduled with lumbar xray prior. He verbalized understanding and agreement.  Patient Instructions   - May increase lifting restriction to 20 pounds with continued bending and twisting restrictions.   - Follow up in 6 weeks with lumbar xray prior.    - Call the clinic at [phone] for increased pain or any other questions and concerns.    Dalia Patel, MANAS  [facility]  [address] [place] [address]  [address] [place] [address]  Tel [phone]  Fax [phone]        Again, " thank you for allowing me to participate in the care of your patient.        Sincerely,        Dalia Patel NP

## 2023-06-23 DIAGNOSIS — M54.16 LUMBAR RADICULOPATHY: ICD-10-CM

## 2023-06-23 DIAGNOSIS — Z98.1 S/P LUMBAR SPINAL FUSION: ICD-10-CM

## 2023-06-23 RX ORDER — OXYCODONE HYDROCHLORIDE 5 MG/1
5-10 TABLET ORAL EVERY 4 HOURS PRN
Qty: 40 TABLET | Refills: 0 | Status: SHIPPED | OUTPATIENT
Start: 2023-06-23 | End: 2023-06-26

## 2023-06-23 NOTE — TELEPHONE ENCOUNTER
Patient calling for a refill of Oxycodone.     DOS: 5/10/23  Procedure: Lumbar fusion  Surgeon: Dr Stallings  Weeks Post Op: 6    Current symptom(s): right low back pain, 5/10, no new symptoms  Current pain management: 2 tabs Oxycodone every 4 hours, Zanaflex TID, Tylenol, ice    Last fill: 6/19/23  Next visit: 8/10/23    Medication pended for your approval, if appropriate. Pharmacy verified.     Any patient questions or concerns:     Informed patient request will be forwarded to care team.

## 2023-06-26 DIAGNOSIS — M48.061 SPINAL STENOSIS OF LUMBAR REGION WITH RADICULOPATHY: ICD-10-CM

## 2023-06-26 DIAGNOSIS — M54.16 SPINAL STENOSIS OF LUMBAR REGION WITH RADICULOPATHY: ICD-10-CM

## 2023-06-26 DIAGNOSIS — M54.16 LUMBAR RADICULOPATHY: ICD-10-CM

## 2023-06-26 DIAGNOSIS — Z98.1 S/P LUMBAR SPINAL FUSION: ICD-10-CM

## 2023-06-26 RX ORDER — TIZANIDINE 2 MG/1
2 TABLET ORAL 3 TIMES DAILY
Qty: 30 TABLET | Refills: 0 | Status: SHIPPED | OUTPATIENT
Start: 2023-06-26 | End: 2023-07-13

## 2023-06-26 RX ORDER — OXYCODONE HYDROCHLORIDE 5 MG/1
5-10 TABLET ORAL EVERY 4 HOURS PRN
Qty: 40 TABLET | Refills: 0 | Status: SHIPPED | OUTPATIENT
Start: 2023-06-26 | End: 2023-07-06

## 2023-06-26 NOTE — TELEPHONE ENCOUNTER
Patient requesting refill of the following    Oxycodone- 8 tablets left    Tizanidine- muscle relaxer- 0 tablets left    Pharmacy- [place], [place]

## 2023-06-26 NOTE — TELEPHONE ENCOUNTER
Patient calling for a refill of Oxycodone and Tizanidine    DOS: 5/10/23  Procedure: Lumbar fusion  Surgeon: Dr Chandrakant Krause Post Op: 7    Current symptom(s): right low back pain 6/10. Was starting to improve, but overdid it the other day.   Current pain management: Current pain management: 2 tabs Oxycodone every 4 hours, Zanaflex TID, Tylenol, ice    Encouraged to start to wean.     6 tabs of Oxycodone    Last fill: 6/23/23  Next visit: 8/10/23    Medication pended for your approval, if appropriate. Pharmacy verified.     Any patient questions or concerns:     Informed patient request will be forwarded to care team.

## 2023-07-05 ENCOUNTER — TELEPHONE (OUTPATIENT)
Dept: NEUROSURGERY | Facility: CLINIC | Age: 58
End: 2023-07-05
Payer: COMMERCIAL

## 2023-07-05 DIAGNOSIS — M54.16 LUMBAR RADICULOPATHY: ICD-10-CM

## 2023-07-05 DIAGNOSIS — Z98.1 S/P LUMBAR SPINAL FUSION: ICD-10-CM

## 2023-07-06 RX ORDER — OXYCODONE HYDROCHLORIDE 5 MG/1
5-10 TABLET ORAL EVERY 4 HOURS PRN
Qty: 40 TABLET | Refills: 0 | Status: SHIPPED | OUTPATIENT
Start: 2023-07-06 | End: 2023-07-12

## 2023-07-06 NOTE — TELEPHONE ENCOUNTER
Patient calling for a refill of oxycodone.     DOS: 5/10/23  Procedure: Lumbar fusion  Surgeon: Dr Stallings  Weeks Post Op: 8 weeks 1 day    Current symptom(s): pain is a 7/10. Pain is located in low back. Patient is trying to use the oxycodone only as needed and starting with 1 tab instead of 2 at a time.   Current pain management: using  Oxycodone as needed, tylenol prn, Zanaflex TID, icing    Last fill: 6/26 #40  Next visit: 8/10    Medication pended for your approval, if appropriate. Pharmacy verified.     Any patient questions or concerns:     Informed patient request will be forwarded to care team.

## 2023-07-12 DIAGNOSIS — M54.16 SPINAL STENOSIS OF LUMBAR REGION WITH RADICULOPATHY: ICD-10-CM

## 2023-07-12 DIAGNOSIS — M48.061 SPINAL STENOSIS OF LUMBAR REGION WITH RADICULOPATHY: ICD-10-CM

## 2023-07-12 DIAGNOSIS — Z98.1 S/P LUMBAR SPINAL FUSION: ICD-10-CM

## 2023-07-12 DIAGNOSIS — M54.16 LUMBAR RADICULOPATHY: ICD-10-CM

## 2023-07-12 NOTE — CONFIDENTIAL NOTE
Patient calling for a refill of oxycodone and tizanidine.      DOS: 5/10/23  Procedure: Lumbar fusion  Surgeon: Dr. Chandrakant Krause Post Op: 9 weeks      Current symptom(s): 6/10 low back pain. Patient denies new symptoms.   Current pain management:   Oxycodone: 1-2 pills Q4 hours  Tizanidine: 1 pill TID  Tylenol: PRN  Ibuprofen: PRN  Ice: PRN    Discussed weaning with patient. Rx changed from Q4 hours to Q6 hours. Advised patient that at next refill we can decrease dose from 1-2 pills to 1 pill. Patient agreeable with weaning plan.     Last fill: 7/6 #40  Next visit: 8/10/23    Medication pended for your approval, if appropriate. Pharmacy verified.     Any patient questions or concerns:     Informed patient request will be forwarded to care team.

## 2023-07-12 NOTE — CONFIDENTIAL NOTE
Attempted to reach out to patient, no answer. Left voice message for them to call clinic back to further discuss.      Patient calling for a refill of oxycodone and tizanidine.     DOS: 5/10/23  Procedure: Lumbar fusion  Surgeon: Dr. Chandrakant Krause Post Op: 9 weeks     Current symptom(s):   Current pain management:     Last fill: 7/6 #40  Next visit: 8/10/23

## 2023-07-12 NOTE — TELEPHONE ENCOUNTER
Patient requesting medication refill for   oxyCODONE (ROXICODONE) 5 MG tablet and tiZANidine (ZANAFLEX) 2 MG tablet.

## 2023-07-12 NOTE — TELEPHONE ENCOUNTER
Patient returning missed call. I informed him about the [employer] message and questions to fill out.

## 2023-07-13 RX ORDER — TIZANIDINE 2 MG/1
2 TABLET ORAL 3 TIMES DAILY
Qty: 30 TABLET | Refills: 1 | Status: SHIPPED | OUTPATIENT
Start: 2023-07-13

## 2023-07-13 RX ORDER — OXYCODONE HYDROCHLORIDE 5 MG/1
5-10 TABLET ORAL EVERY 6 HOURS PRN
Qty: 40 TABLET | Refills: 0 | Status: SHIPPED | OUTPATIENT
Start: 2023-07-13 | End: 2023-07-21

## 2023-07-21 DIAGNOSIS — M54.16 LUMBAR RADICULOPATHY: ICD-10-CM

## 2023-07-21 DIAGNOSIS — Z98.1 S/P LUMBAR SPINAL FUSION: ICD-10-CM

## 2023-07-21 RX ORDER — OXYCODONE HYDROCHLORIDE 5 MG/1
5-10 TABLET ORAL EVERY 6 HOURS PRN
Qty: 40 TABLET | Refills: 0 | Status: SHIPPED | OUTPATIENT
Start: 2023-07-21

## 2023-07-21 NOTE — TELEPHONE ENCOUNTER
Patient calling for a refill of oxycodone and zanaflex. Patient has refill of Zanaflex available to fill, he will call pharmacy.      DOS: 5/10/23  Procedure: Lumbar fusion  Surgeon: Dr. Stallings  Weeks Post Op: 10 weeks 2 days    Current symptom(s): Pain is currently a 7/10. Pain is located in his low back and into right hip. No new n/t or weakness.   Current pain management: Oxycodone 1-2 tablets every 6 hours, Zanaflex TID, tylenol and ibuprofen throughout the day, using ice     Last fill: oxy 7/13 #40  Next visit: 8/10     Medication pended for your approval, if appropriate. Pharmacy verified.     Any patient questions or concerns:     Informed patient request will be forwarded to care team.     Discussed weaning with patient. He is going to try to use only 1 tablet every 6 hours or 2 tablets every 8 hours. Reviewed we typically are unable to provide refills after 12 weeks post-op. He verbalized understanding.

## 2023-07-21 NOTE — TELEPHONE ENCOUNTER
Patient requesting refill of following:    Oxy- no tablets left    Muscle Relaxer- patient states starts with a T- didn't know the name    Pharmacy is [facility]

## 2023-12-07 ENCOUNTER — TELEPHONE (OUTPATIENT)
Dept: NEUROSURGERY | Facility: CLINIC | Age: 58
End: 2023-12-07
Payer: COMMERCIAL

## 2023-12-07 NOTE — TELEPHONE ENCOUNTER
[facility] Call Center    Phone Message    May a detailed message be left on voicemail: yes     Reason for Call: Other: Per pt DMV is requesting a letter from Dr. Stallings stating pt can drive after back surgery. Pt states pt's back feels fine. DOS 05/10/23. Can care team send that note via [employer]? They are going to suspend his license if he doesn't have that by tomorrow 12/08/23     Action Taken: Other: [facility] [[id number]]    Travel Screening: Not Applicable

## 2023-12-07 NOTE — TELEPHONE ENCOUNTER
Per chart review patient no showed 12 week follow-up appointment and XR on 8/10/23. Patient underwent lumbar fusion with Dr. Stallings on 5/10/23.     Patient calling requesting letter for DMV stating he is cleared to drive from our standpoint.     Attempted to reach out to patient, no answer. Left voice message for them to call clinic back to further discuss.

## 2023-12-12 ENCOUNTER — TELEPHONE (OUTPATIENT)
Dept: NEUROSURGERY | Facility: CLINIC | Age: 58
End: 2023-12-12
Payer: COMMERCIAL

## 2023-12-12 NOTE — TELEPHONE ENCOUNTER
LVM for patient to call back to reschedule his missed 12 week follow up from surgery appointment from back in August. Also needs XR.

## 2024-02-01 ENCOUNTER — TELEPHONE (OUTPATIENT)
Dept: NEUROSURGERY | Facility: CLINIC | Age: 59
End: 2024-02-01
Payer: COMMERCIAL

## 2024-02-01 NOTE — TELEPHONE ENCOUNTER
Appointment reminder message  was left for patient's neurosurgery visit on 2-2-24 at the [facility].

## 2024-02-02 ENCOUNTER — ANCILLARY PROCEDURE (OUTPATIENT)
Dept: GENERAL RADIOLOGY | Facility: CLINIC | Age: 59
End: 2024-02-02
Attending: NURSE PRACTITIONER
Payer: OTHER MISCELLANEOUS

## 2024-02-02 ENCOUNTER — OFFICE VISIT (OUTPATIENT)
Dept: NEUROSURGERY | Facility: CLINIC | Age: 59
End: 2024-02-02
Payer: OTHER MISCELLANEOUS

## 2024-02-02 ENCOUNTER — TELEPHONE (OUTPATIENT)
Dept: NEUROSURGERY | Facility: CLINIC | Age: 59
End: 2024-02-02
Payer: COMMERCIAL

## 2024-02-02 VITALS
TEMPERATURE: 98.1 F | WEIGHT: 168 LBS | HEIGHT: 70 IN | SYSTOLIC BLOOD PRESSURE: 126 MMHG | DIASTOLIC BLOOD PRESSURE: 74 MMHG | BODY MASS INDEX: 24.05 KG/M2

## 2024-02-02 DIAGNOSIS — Z98.1 S/P LUMBAR SPINAL FUSION: ICD-10-CM

## 2024-02-02 DIAGNOSIS — Z98.1 S/P LUMBAR SPINAL FUSION: Primary | ICD-10-CM

## 2024-02-02 LAB — RADIOLOGIST FLAGS: NORMAL

## 2024-02-02 PROCEDURE — 72100 X-RAY EXAM L-S SPINE 2/3 VWS: CPT | Mod: TC | Performed by: PREVENTIVE MEDICINE

## 2024-02-02 PROCEDURE — 99213 OFFICE O/P EST LOW 20 MIN: CPT | Performed by: NURSE PRACTITIONER

## 2024-02-02 ASSESSMENT — PAIN SCALES - GENERAL: PAINLEVEL: MODERATE PAIN (4)

## 2024-02-02 NOTE — TELEPHONE ENCOUNTER
Received call from  imaging access center with results to be reported to provider -     1. L3-L5 pedicle screw and luke instrumentation. Vertically oriented lucency through the left L3 vertebral body screw compatible with screw fracture. Increased conspicuity of lucency surrounding the right L3 pedicle screw which may represent loosening.    Per chart review, Dalia Patel NP reviewed in appointment today and CT was ordered. Encounter routed to providers for review.

## 2024-02-02 NOTE — LETTER
"    2/2/2024         RE: Waqas Lechuga  [id number] [facility] [address]        Dear Colleague,    Thank you for referring your patient, Waqas Lechuga, to the [facility] NEUROSURGERY CLINIC [place]. Please see a copy of my visit note below.    Waqas Lechuga is a 58 year old male who presents for:  Chief Complaint   Patient presents with     Neurologic Problem        Initial Vitals:  /74 (BP Location: Right arm, Patient Position: Sitting, Cuff Size: Adult Regular)   Temp 98.1  F (36.7  C) (Temporal)   Ht 5' 10\" (1.778 m)   Wt 168 lb (76.2 kg)   BMI 24.11 kg/m   Estimated body mass index is 24.11 kg/m  as calculated from the following:    Height as of this encounter: 5' 10\" (1.778 m).    Weight as of this encounter: 168 lb (76.2 kg).. Body surface area is 1.94 meters squared. BP completed using cuff size: regular  Moderate Pain (4)    Nursing Comments:     Anju Matthew MA      [facility] Neurosurgery  Neurosurgery Follow Up:    HPI: 9.5 months s/p L3-4 extension TLIF with Dr. Stallings on 5/10/2023. Doing well. Reports significant improvement in preoperative symptoms. Has some intermittent left low back soreness. No radicular leg pain, paresthesias, or overt weakness. No incisional concerns.     Medical, surgical, family, and social history unchanged since prior exam.  Exam:  Constitutional:  Alert, well nourished, NAD.  HEENT: Normocephalic, atraumatic.   Pulm:  Without shortness of breath   CV:  No pitting edema of BLE.      Vital Signs:  /74 (BP Location: Right arm, Patient Position: Sitting, Cuff Size: Adult Regular)   Temp 98.1  F (36.7  C) (Temporal)   Ht 5' 10\" (1.778 m)   Wt 168 lb (76.2 kg)   BMI 24.11 kg/m      Neurological:  Awake  Alert  Oriented x 3  Motor exam:        IP Q DF PF EHL  R   5  5   5   5    5  L   5  5   5   5    5     Able to spontaneously move L/E bilaterally  Sensation intact throughout all L/E dermatomes     Incisions:  Healing " nicely  Imaging: AP and lateral films reveal concern for left L3 pedicle screw.    A/P: s/p lumbar spine fusion  Reviewed lumbar XR in clinic. Will discuss with Dr. Stallings and contact him with further recommendations. He states he needed an updated letter saying he is ok to drive. He verbalized understanding and agreement.  Patient Instructions   -I will contact you with further recommendations.  -Please contact our clinic with questions or concerns at [phone].    Dalia Patel CNP  [facility]surgery  [address] [place] [address]  Tel [phone]  Fax [phone]      Again, thank you for allowing me to participate in the care of your patient.        Sincerely,        Dalia Patel, TAMERA

## 2024-02-02 NOTE — TELEPHONE ENCOUNTER
[facility]-schedule lumbar CT and follow up with Dr. Stallings after the CT is completed per staff message.

## 2024-02-02 NOTE — PATIENT INSTRUCTIONS
-Lumbar CT ordered. They will contact you to schedule.  -Follow up in clinic with Dr. Stallings to discuss lumbar CT results and recommendations.   -Please contact our clinic with questions or concerns at [phone].
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
## 2024-02-02 NOTE — PROGRESS NOTES
"[facility] Neurosurgery  Neurosurgery Follow Up:    HPI: 9.5 months s/p L3-4 extension TLIF with Dr. Stallings on 5/10/2023. Doing well. Reports significant improvement in preoperative symptoms. Has some intermittent left low back soreness. No radicular leg pain, paresthesias, or overt weakness. No incisional concerns.     Medical, surgical, family, and social history unchanged since prior exam.  Exam:  Constitutional:  Alert, well nourished, NAD.  HEENT: Normocephalic, atraumatic.   Pulm:  Without shortness of breath   CV:  No pitting edema of BLE.      Vital Signs:  /74 (BP Location: Right arm, Patient Position: Sitting, Cuff Size: Adult Regular)   Temp 98.1  F (36.7  C) (Temporal)   Ht 5' 10\" (1.778 m)   Wt 168 lb (76.2 kg)   BMI 24.11 kg/m      Neurological:  Awake  Alert  Oriented x 3  Motor exam:        IP Q DF PF EHL  R   5  5   5   5    5  L   5  5   5   5    5     Able to spontaneously move L/E bilaterally  Sensation intact throughout all L/E dermatomes     Incisions:  Healing nicely  Imaging: AP and lateral films reveal concern for left L3 pedicle screw.    A/P: s/p lumbar spine fusion  Reviewed lumbar XR in clinic. Due to concern for fractured screw, will plan to obtain lumbar CT and follow up in clinic with Dr. Stallings to discuss next steps.  He states he needed an updated letter saying he is ok to drive. He verbalized understanding and agreement.  Patient Instructions   -Lumbar CT ordered. They will contact you to schedule.  -Follow up in clinic with Dr. Stallings to discuss lumbar CT results and recommendations.   -Please contact our clinic with questions or concerns at [phone].    Dalia Patel, MANAS  [facility]  [address] [place] [address]  [address] [place] [address]  Tel [phone]  Fax [phone]    "

## 2024-02-02 NOTE — PROGRESS NOTES
"Waqas Lechuga is a 58 year old male who presents for:  Chief Complaint   Patient presents with    Neurologic Problem        Initial Vitals:  /74 (BP Location: Right arm, Patient Position: Sitting, Cuff Size: Adult Regular)   Temp 98.1  F (36.7  C) (Temporal)   Ht 5' 10\" (1.778 m)   Wt 168 lb (76.2 kg)   BMI 24.11 kg/m   Estimated body mass index is 24.11 kg/m  as calculated from the following:    Height as of this encounter: 5' 10\" (1.778 m).    Weight as of this encounter: 168 lb (76.2 kg).. Body surface area is 1.94 meters squared. BP completed using cuff size: regular  Moderate Pain (4)    Nursing Comments:     Anju Matthew MA    "

## 2024-02-05 NOTE — TELEPHONE ENCOUNTER
2nd attempt. [facility]-schedule lumbar CT and follow up with Dr. Stallings after the CT is completed per staff message.

## 2024-02-12 NOTE — TELEPHONE ENCOUNTER
3rd attempt. [facility]-schedule lumbar CT and follow up with Dr. Stallings after the CT is completed per staff message

## 2024-03-13 ENCOUNTER — DOCUMENTATION ONLY (OUTPATIENT)
Dept: NEUROSURGERY | Facility: CLINIC | Age: 59
End: 2024-03-13
Payer: COMMERCIAL

## 2024-03-13 ENCOUNTER — TELEPHONE (OUTPATIENT)
Dept: NEUROSURGERY | Facility: CLINIC | Age: 59
End: 2024-03-13
Payer: COMMERCIAL

## 2024-03-13 NOTE — CONFIDENTIAL NOTE
Requesting images for review at appointment with Dr. Stallings.    Action P [phone] [place] Joyce  3/13   Action Taken CT Lumbar 2/26/24   Status: pushed to PACs    Created Anc order and exam, accession number [id number].  CT received in exceptions in PACs [place].  Resolving into patients [facility] chart, attached to accession number above. Resolved.  Report has already been scanned in, images in.      Signed by Pearl Tiwari on March 13, 2024 2:08 PM

## 2024-03-13 NOTE — TELEPHONE ENCOUNTER
Attempted to reach patient to remind them about appointment scheduled with Demetri Stallings MD on 3/14/24 in our [place] location.  A voicemail was left with a call back number if the patient has questions or would like to reschedule.

## 2024-06-06 DIAGNOSIS — Z98.1 S/P LUMBAR SPINAL FUSION: Primary | ICD-10-CM

## 2024-06-06 NOTE — PROGRESS NOTES
LM requesting patient arrive 15 minutes prior to scheduled visit on 6/13/24 @ 9:00am to allow time for x-rays. Orders placed foR 1 year x-rays post-op TLIF.    Anabell Wang RN on 6/6/2024 at 10:41 AM

## 2024-06-12 ENCOUNTER — TELEPHONE (OUTPATIENT)
Dept: NEUROSURGERY | Facility: CLINIC | Age: 59
End: 2024-06-12
Payer: COMMERCIAL

## 2024-06-12 NOTE — TELEPHONE ENCOUNTER
Left appointment reminder for patient. Instructed to call [phone] if this appointment needs to be changed or rescheduled BH

## 2024-06-13 ENCOUNTER — ANCILLARY PROCEDURE (OUTPATIENT)
Dept: GENERAL RADIOLOGY | Facility: CLINIC | Age: 59
End: 2024-06-13
Attending: NEUROLOGICAL SURGERY
Payer: OTHER MISCELLANEOUS

## 2024-06-13 ENCOUNTER — OFFICE VISIT (OUTPATIENT)
Dept: NEUROSURGERY | Facility: CLINIC | Age: 59
End: 2024-06-13
Payer: OTHER MISCELLANEOUS

## 2024-06-13 VITALS
SYSTOLIC BLOOD PRESSURE: 138 MMHG | BODY MASS INDEX: 23.24 KG/M2 | HEART RATE: 91 BPM | OXYGEN SATURATION: 98 % | RESPIRATION RATE: 16 BRPM | WEIGHT: 162 LBS | DIASTOLIC BLOOD PRESSURE: 80 MMHG | TEMPERATURE: 97.1 F

## 2024-06-13 DIAGNOSIS — Z98.1 S/P LUMBAR FUSION: Primary | ICD-10-CM

## 2024-06-13 DIAGNOSIS — Z98.1 S/P LUMBAR SPINAL FUSION: ICD-10-CM

## 2024-06-13 PROCEDURE — 99213 OFFICE O/P EST LOW 20 MIN: CPT | Performed by: NEUROLOGICAL SURGERY

## 2024-06-13 PROCEDURE — 72100 X-RAY EXAM L-S SPINE 2/3 VWS: CPT | Mod: TC | Performed by: RADIOLOGY

## 2024-06-13 ASSESSMENT — PAIN SCALES - GENERAL: PAINLEVEL: MILD PAIN (2)

## 2024-06-13 NOTE — PROGRESS NOTES
[facility] Neurosurgery  Neurosurgery Follow Up:    HPI: 1 year s/p L3-4 extension TLIF on 5/10/2023. Doing well. Reports significant improvement in preoperative symptoms. Has continued intermittent left low back soreness over the SI joint. No radicular leg pain, paresthesias, or overt weakness. No incisional concerns.  XRs and CT Lumbar, personally reviewed, with L3 haloed screws and left L3 fractured screw, stable interbody position, good facet fusion at L4-5, no clear fusion at L3-4 but possible early bridging bone.    Medical, surgical, family, and social history unchanged since prior exam.  Exam:  Constitutional:  Alert, well nourished, NAD.  HEENT: Normocephalic, atraumatic.   Pulm:  Without shortness of breath   CV:  No pitting edema of BLE.      Vital Signs:  /80   Pulse 91   Temp 97.1  F (36.2  C) (Temporal)   Resp 16   Wt 73.5 kg (162 lb)   SpO2 98%   BMI 23.24 kg/m      Neurological:  Awake  Alert  Oriented x 3  Motor exam:        IP Q DF PF EHL  R   5  5   5   5    5  L   5  5   5   5    5     Able to spontaneously move L/E bilaterally  Sensation intact throughout all L/E dermatomes     Incisions:  Healing nicely  Imaging: AP and lateral films reveal concern for left L3 pedicle screw.    A/P: s/p lumbar spine fusion  Fractured/haloed screws at L3, but patient doing well clinically with good activity levels, and stable interbody position  Discussed that fusion process evolves over up to 2 years after surgery  Continue to advance activity, and he will notify us if developing new/worsening symptoms

## 2024-06-13 NOTE — NURSING NOTE
"Waqas Lechuga is a 58 year old male who presents for:  Chief Complaint   Patient presents with    Neurologic Problem     Review CT Scan  and 1 year follow up S/P TLIF         Initial Vitals:  /80   Pulse 91   Temp 97.1  F (36.2  C) (Temporal)   Resp 16   Wt 162 lb (73.5 kg)   SpO2 98%   BMI 23.24 kg/m   Estimated body mass index is 23.24 kg/m  as calculated from the following:    Height as of 2/2/24: 5' 10\" (1.778 m).    Weight as of this encounter: 162 lb (73.5 kg).. Body surface area is 1.91 meters squared. BP completed using cuff size: regular  Mild Pain (2)    Nursing Comments: Waqas to follow up with Primary Care provider regarding elevated blood pressure.      Nguyen Willard    "

## 2024-06-13 NOTE — LETTER
6/13/2024      Waqas Lechuga  [address] [facility] [address]      Dear Colleague,    Thank you for referring your patient, Waqas Lechuga, to the [facility] NEUROSURGERY CLINIC [place]. Please see a copy of my visit note below.    [facility]surgery  Neurosurgery Follow Up:    HPI: 1 year s/p L3-4 extension TLIF on 5/10/2023. Doing well. Reports significant improvement in preoperative symptoms. Has continued intermittent left low back soreness over the SI joint. No radicular leg pain, paresthesias, or overt weakness. No incisional concerns.  XRs and CT Lumbar, personally reviewed, with L3 haloed screws and left L3 fractured screw, stable interbody position, good facet fusion at L4-5, no clear fusion at L3-4 but possible early bridging bone.    Medical, surgical, family, and social history unchanged since prior exam.  Exam:  Constitutional:  Alert, well nourished, NAD.  HEENT: Normocephalic, atraumatic.   Pulm:  Without shortness of breath   CV:  No pitting edema of BLE.      Vital Signs:  /80   Pulse 91   Temp 97.1  F (36.2  C) (Temporal)   Resp 16   Wt 73.5 kg (162 lb)   SpO2 98%   BMI 23.24 kg/m      Neurological:  Awake  Alert  Oriented x 3  Motor exam:        IP Q DF PF EHL  R   5  5   5   5    5  L   5  5   5   5    5     Able to spontaneously move L/E bilaterally  Sensation intact throughout all L/E dermatomes     Incisions:  Healing nicely  Imaging: AP and lateral films reveal concern for left L3 pedicle screw.    A/P: s/p lumbar spine fusion  Fractured/haloed screws at L3, but patient doing well clinically with good activity levels, and stable interbody position  Discussed that fusion process evolves over up to 2 years after surgery  Continue to advance activity, and he will notify us if developing new/worsening symptoms      Again, thank you for allowing me to participate in the care of your patient.        Sincerely,        Demetri Stallings MD

## 2024-08-04 ENCOUNTER — HEALTH MAINTENANCE LETTER (OUTPATIENT)
Age: 59
End: 2024-08-04

## 2024-09-09 ENCOUNTER — TELEPHONE (OUTPATIENT)
Dept: NEUROSURGERY | Facility: CLINIC | Age: 59
End: 2024-09-09
Payer: COMMERCIAL

## 2024-09-09 ENCOUNTER — NURSE TRIAGE (OUTPATIENT)
Dept: NURSING | Facility: CLINIC | Age: 59
End: 2024-09-09
Payer: COMMERCIAL

## 2024-09-09 DIAGNOSIS — Z98.1 S/P LUMBAR FUSION: Primary | ICD-10-CM

## 2024-09-09 NOTE — TELEPHONE ENCOUNTER
[facility] Call Center    Phone Message    May a detailed message be left on voicemail: yes     Reason for Call: Other: Patient is returning phone call to nurse, please call back.      Action Taken: Message routed to:  Other: [facility]    Travel Screening: Not Applicable

## 2024-09-09 NOTE — TELEPHONE ENCOUNTER
58 year old s/p Lumbar 3 to Lumbar 4 Extension Transforaminal Lumbar Interbody Fusion on 5/10/24   He calls today with lower back pain that radiates down right leg  He states it started 3 weeks ago and is getting worse  He has been using NSAIDS and ice   Hurts worse when wakes up in morning   Feels better after warm shower or exercise  Would like to be seen in [place]           Reason for Disposition   Patient wants to be seen    Additional Information   Negative: Passed out (i.e., fainted, collapsed and was not responding)   Negative: Shock suspected (e.g., cold/pale/clammy skin, too weak to stand, low BP, rapid pulse)   Negative: Sounds like a life-threatening emergency to the triager   Negative: SEVERE back pain (e.g., excruciating, unable to do any normal activities) and not improved after pain medicine and CARE ADVICE   Negative: Numbness in an arm or hand (i.e., loss of sensation) and upper back pain   Negative: Numbness in a leg or foot (i.e., loss of sensation)   Negative: High-risk adult (e.g., history of cancer, history of HIV, or history of IV Drug Use)   Negative: Soft tissue infection (e.g., abscess, cellulitis) or other serious infection (e.g., bacteremia) in last 2 weeks   Negative: Painful rash with multiple small blisters grouped together (i.e., dermatomal distribution or 'band' or 'stripe')   Negative: Pain radiates into the thigh or further down the leg, and in both legs   Negative: Age > 50 and no history of prior similar back pain   Negative: Fever > 100.4 F (38.0 C) and flank pain   Negative: Pain or burning with passing urine (urination)   Negative: Pain radiates into groin, scrotum   Negative: Blood in urine (red, pink, or tea-colored)   Negative: Vomiting and pain over lower ribs of back (i.e., flank - kidney area)   Negative: Weakness of a leg or foot (e.g., unable to bear weight, dragging foot)   Negative: Patient sounds very sick or weak to the triager   Negative: SEVERE back pain of  "sudden onset and age > 60 years   Negative: SEVERE abdominal pain (e.g., excruciating)   Negative: Abdominal pain and age > 60 years   Negative: Unable to urinate (or only a few drops) and bladder feels very full   Negative: Loss of bladder or bowel control (urine or bowel incontinence; wetting self, leaking stool) of new-onset   Negative: Numbness (loss of sensation) in groin or rectal area   Negative: Major injury to the back (e.g., MVA, fall > 10 feet or 3 meters, penetrating injury, etc.)   Negative: Pain in the upper back over the ribs (rib cage) that radiates (travels) into the chest   Negative: Pain in the upper back over the ribs (rib cage) and worsened by coughing (or clearly increases with breathing)   Negative: Back pain during pregnancy    Answer Assessment - Initial Assessment Questions  1. ONSET: \"When did the pain begin?\"       Lower back  belt line  2. LOCATION: \"Where does it hurt?\" (upper, mid or lower back)      Lower back  3. SEVERITY: \"How bad is the pain?\"  (e.g., Scale 1-10; mild, moderate, or severe)    - MILD (1-3): Doesn't interfere with normal activities.     - MODERATE (4-7): Interferes with normal activities or awakens from sleep.     - SEVERE (8-10): Excruciating pain, unable to do any normal activities.       7/10  when waking up 10/10  4. PATTERN: \"Is the pain constant?\" (e.g., yes, no; constant, intermittent)       constant  5. RADIATION: \"Does the pain shoot into your legs or somewhere else?\"      Yes down right leg  6. CAUSE:  \"What do you think is causing the back pain?\"       Not sure   7. BACK OVERUSE:  \"Any recent lifting of heavy objects, strenuous work or exercise?\"      no  8. MEDICINES: \"What have you taken so far for the pain?\" (e.g., nothing, acetaminophen, NSAIDS)      Nsaid and ice  9. NEUROLOGIC SYMPTOMS: \"Do you have any weakness, numbness, or problems with bowel/bladder control?\"      no  10. OTHER SYMPTOMS: \"Do you have any other symptoms?\" (e.g., fever, abdomen " "pain, burning with urination, blood in urine)        no  11. PREGNANCY: \"Is there any chance you are pregnant?\" \"When was your last menstrual period?\"        no    Protocols used: Back Pain-A-[place]    "

## 2024-09-09 NOTE — TELEPHONE ENCOUNTER
Reviewed triage notes from Anabell Fuentes RN.     LOV with Dr. Stallings on 6/13/24 had lumbar XR prior for One year post op follow-up.     He is s/p L3-4 extension TLIF on 5/10/2023.      Attempted to reach out to patient, no answer. Left voice message asking patient to call clinic back to further discuss [phone].

## 2024-09-10 NOTE — CONFIDENTIAL NOTE
Attempted to reach out to patient, no answer. Left voice message asking patient to call clinic back to further discuss.     LOV with Dr. Stallings on 6/13/24 had lumbar XR prior for One year post op follow-up.      He is s/p L3-4 extension TLIF on 5/10/2023.

## 2024-09-11 NOTE — TELEPHONE ENCOUNTER
Dr. Stallings recommends patient see an SHIREEN with XR prior to start.     XR ordered. Routed to scheduling team to coordinate.

## 2024-09-11 NOTE — TELEPHONE ENCOUNTER
Message left with patient to schedule imaging along with a follow up visit with the following SHIREEN: Dalia Patel, Rhea Hyde, or Vannessa Lawson    Patient also has an imaging order that needs to be scheduled as well.

## 2025-08-16 ENCOUNTER — HEALTH MAINTENANCE LETTER (OUTPATIENT)
Age: 60
End: 2025-08-16

## (undated) DEVICE — TOOL DISSECT MIDAS MR8 14CM TELESC MATCH 2.5 MR8-T14MH25

## (undated) DEVICE — SYR EAR BULB 3OZ 0035830

## (undated) DEVICE — BLADE BONE MILL STRK 3.2MM FINE 5400-702-000

## (undated) DEVICE — MANIFOLD NEPTUNE 4 PORT 700-20

## (undated) DEVICE — DRAPE MICROSCOPE LEICA 54X150" AR8033650

## (undated) DEVICE — RX SURGIFLO HEMOSTATIC MATRIX W/THROMBIN 8ML 2994

## (undated) DEVICE — WIPES FOLEY CARE SURESTEP PROVON DFC100

## (undated) DEVICE — SPONGE COTTONOID 1/2X3" 80-1407

## (undated) DEVICE — PACK UNIVERSAL SPLIT 29131

## (undated) DEVICE — MARKER SPHERES PASSIVE MEDT PACK 5 8801075

## (undated) DEVICE — LINEN TOWEL PACK X5 5464

## (undated) DEVICE — SU VICRYL 0 CT-1 CR 8X18" J740D

## (undated) DEVICE — ESU BIPOLAR SEALER AQUAMANTYS 6MM 23-112-1

## (undated) DEVICE — SU VICRYL 0 CT-2 CR 8X18" J727D

## (undated) DEVICE — GLOVE PROTEXIS BLUE W/NEU-THERA 6.5  2D73EB65

## (undated) DEVICE — GLOVE BIOGEL PI MICRO INDICATOR UNDERGLOVE SZ 8.0 48980

## (undated) DEVICE — ESU GROUND PAD UNIVERSAL W/O CORD

## (undated) DEVICE — SYR 20ML LL W/O NDL 302830

## (undated) DEVICE — KIT PATIENT JACKSON 1 SPK10118

## (undated) DEVICE — PACK LARGE SPINE SNE15LSFSE

## (undated) DEVICE — CATH TRAY FOLEY COUDE SURESTEP 16FR W/DRN BAG LATEX A304416A

## (undated) DEVICE — SPONGE SURGIFOAM 50

## (undated) DEVICE — ESU ELEC BLADE 2.75" COATED/INSULATED E1455

## (undated) DEVICE — KIT MAZOR X ROBOTIC SPINE DISP KIT0574

## (undated) DEVICE — SOL WATER IRRIG 1000ML BOTTLE 2F7114

## (undated) DEVICE — NDL 19GA 1.5"

## (undated) DEVICE — DRAPE MAYO STAND 23X54 8337

## (undated) DEVICE — Device

## (undated) DEVICE — STPL SKIN 35W ROTATING HEAD PRW35

## (undated) DEVICE — DRAIN JACKSON PRATT 07MM FLAT SU130-1310

## (undated) DEVICE — SU ETHILON 2-0 FS 18" 664H

## (undated) DEVICE — PACK SPINE SM CUSTOM SNE15SSFSK

## (undated) DEVICE — SPONGE SURGIFOAM 100 1974

## (undated) DEVICE — TUBING SUCTION SOFT 20'X3/16" 0036570

## (undated) DEVICE — POSITIONER PT PRONESAFE HEAD REST W/DERMAPROX INSERT 40599

## (undated) DEVICE — DRSG KERLIX FLUFFS X5

## (undated) DEVICE — SPONGE LAP 18X18" X8435

## (undated) DEVICE — DRAPE SHEET REV FOLD 3/4 9349

## (undated) DEVICE — DRAPE IOBAN INCISE 23X17" 6650EZ

## (undated) DEVICE — DRSG ABDOMINAL 07 1/2X8" 7197D

## (undated) DEVICE — DRAIN JACKSON PRATT RESERVOIR 100ML SU130-1305

## (undated) DEVICE — ESU PENCIL W/HOLSTER E2350H

## (undated) DEVICE — PREP DURAPREP 26ML APL 8630

## (undated) DEVICE — ESU ELEC BLADE 6" COATED/INSULATED E1455-6

## (undated) DEVICE — SOL NACL 0.9% INJ 250ML BAG 2B1322Q

## (undated) DEVICE — TOOL DISSECT MIDAS MR8 14CM MATCH HEAD 3MM MR8-14MH30

## (undated) DEVICE — SU VICRYL 2-0 CT-2 CR 8X18" J726D

## (undated) DEVICE — DRAPE COVER C-ARM SEAMLESS SNAP-KAP 03-KP26 LATEX FREE

## (undated) DEVICE — SU ETHILON 3-0 FS-1 18" 669H

## (undated) DEVICE — GLOVE PROTEXIS W/NEU-THERA 7.5  2D73TE75

## (undated) DEVICE — GLOVE PROTEXIS BLUE W/NEU-THERA 8.0  2D73EB80

## (undated) DEVICE — SPONGE RAY-TEC 4X8" 7318

## (undated) DEVICE — GLOVE BIOGEL PI MICRO SZ 7.5 48575

## (undated) DEVICE — NDL SPINAL 18GA 3.5" 405184

## (undated) DEVICE — DRSG GAUZE 4X4" 2187

## (undated) DEVICE — GLOVE PROTEXIS W/NEU-THERA 6.5  2D73TE65

## (undated) RX ORDER — FENTANYL CITRATE 50 UG/ML
INJECTION, SOLUTION INTRAMUSCULAR; INTRAVENOUS
Status: DISPENSED
Start: 2022-02-25

## (undated) RX ORDER — HYDROMORPHONE HCL IN WATER/PF 6 MG/30 ML
PATIENT CONTROLLED ANALGESIA SYRINGE INTRAVENOUS
Status: DISPENSED
Start: 2022-02-25

## (undated) RX ORDER — HYDROMORPHONE HYDROCHLORIDE 1 MG/ML
INJECTION, SOLUTION INTRAMUSCULAR; INTRAVENOUS; SUBCUTANEOUS
Status: DISPENSED
Start: 2022-02-25

## (undated) RX ORDER — FENTANYL CITRATE 0.05 MG/ML
INJECTION, SOLUTION INTRAMUSCULAR; INTRAVENOUS
Status: DISPENSED
Start: 2023-05-10

## (undated) RX ORDER — DEXAMETHASONE SODIUM PHOSPHATE 4 MG/ML
INJECTION, SOLUTION INTRA-ARTICULAR; INTRALESIONAL; INTRAMUSCULAR; INTRAVENOUS; SOFT TISSUE
Status: DISPENSED
Start: 2022-02-25

## (undated) RX ORDER — GABAPENTIN 300 MG/1
CAPSULE ORAL
Status: DISPENSED
Start: 2022-02-25

## (undated) RX ORDER — GABAPENTIN 300 MG/1
CAPSULE ORAL
Status: DISPENSED
Start: 2023-05-10

## (undated) RX ORDER — KETAMINE HCL IN NACL, ISO-OSM 100MG/10ML
SYRINGE (ML) INJECTION
Status: DISPENSED
Start: 2022-02-25

## (undated) RX ORDER — HYDROMORPHONE HYDROCHLORIDE 1 MG/ML
INJECTION, SOLUTION INTRAMUSCULAR; INTRAVENOUS; SUBCUTANEOUS
Status: DISPENSED
Start: 2023-05-10

## (undated) RX ORDER — LIDOCAINE HYDROCHLORIDE 20 MG/ML
INJECTION, SOLUTION EPIDURAL; INFILTRATION; INTRACAUDAL; PERINEURAL
Status: DISPENSED
Start: 2022-02-25

## (undated) RX ORDER — FENTANYL CITRATE 0.05 MG/ML
INJECTION, SOLUTION INTRAMUSCULAR; INTRAVENOUS
Status: DISPENSED
Start: 2022-02-25

## (undated) RX ORDER — ONDANSETRON 2 MG/ML
INJECTION INTRAMUSCULAR; INTRAVENOUS
Status: DISPENSED
Start: 2023-05-10

## (undated) RX ORDER — PROPOFOL 10 MG/ML
INJECTION, EMULSION INTRAVENOUS
Status: DISPENSED
Start: 2023-05-10

## (undated) RX ORDER — NEOSTIGMINE METHYLSULFATE 1 MG/ML
VIAL (ML) INJECTION
Status: DISPENSED
Start: 2022-02-25

## (undated) RX ORDER — CEFAZOLIN SODIUM 1 G/3ML
INJECTION, POWDER, FOR SOLUTION INTRAMUSCULAR; INTRAVENOUS
Status: DISPENSED
Start: 2022-02-25

## (undated) RX ORDER — ALBUMIN, HUMAN INJ 5% 5 %
SOLUTION INTRAVENOUS
Status: DISPENSED
Start: 2022-02-25

## (undated) RX ORDER — CEFAZOLIN SODIUM/WATER 2 G/20 ML
SYRINGE (ML) INTRAVENOUS
Status: DISPENSED
Start: 2022-02-25

## (undated) RX ORDER — GLYCOPYRROLATE 0.2 MG/ML
INJECTION, SOLUTION INTRAMUSCULAR; INTRAVENOUS
Status: DISPENSED
Start: 2022-02-25

## (undated) RX ORDER — METHYLPREDNISOLONE ACETATE 40 MG/ML
INJECTION, SUSPENSION INTRA-ARTICULAR; INTRALESIONAL; INTRAMUSCULAR; SOFT TISSUE
Status: DISPENSED
Start: 2022-02-25

## (undated) RX ORDER — PROPOFOL 10 MG/ML
INJECTION, EMULSION INTRAVENOUS
Status: DISPENSED
Start: 2022-02-25

## (undated) RX ORDER — DEXMEDETOMIDINE HYDROCHLORIDE 4 UG/ML
INJECTION, SOLUTION INTRAVENOUS
Status: DISPENSED
Start: 2022-02-25

## (undated) RX ORDER — FENTANYL CITRATE 50 UG/ML
INJECTION, SOLUTION INTRAMUSCULAR; INTRAVENOUS
Status: DISPENSED
Start: 2023-05-10

## (undated) RX ORDER — DEXMEDETOMIDINE HYDROCHLORIDE 4 UG/ML
INJECTION, SOLUTION INTRAVENOUS
Status: DISPENSED
Start: 2023-05-10

## (undated) RX ORDER — ONDANSETRON 2 MG/ML
INJECTION INTRAMUSCULAR; INTRAVENOUS
Status: DISPENSED
Start: 2022-02-25

## (undated) RX ORDER — CEFAZOLIN SODIUM/WATER 2 G/20 ML
SYRINGE (ML) INTRAVENOUS
Status: DISPENSED
Start: 2023-05-10

## (undated) RX ORDER — BUPIVACAINE HYDROCHLORIDE AND EPINEPHRINE 5; 5 MG/ML; UG/ML
INJECTION, SOLUTION EPIDURAL; INTRACAUDAL; PERINEURAL
Status: DISPENSED
Start: 2023-05-10

## (undated) RX ORDER — DEXAMETHASONE SODIUM PHOSPHATE 4 MG/ML
INJECTION, SOLUTION INTRA-ARTICULAR; INTRALESIONAL; INTRAMUSCULAR; INTRAVENOUS; SOFT TISSUE
Status: DISPENSED
Start: 2023-05-10

## (undated) RX ORDER — BUPIVACAINE HYDROCHLORIDE AND EPINEPHRINE 5; 5 MG/ML; UG/ML
INJECTION, SOLUTION EPIDURAL; INTRACAUDAL; PERINEURAL
Status: DISPENSED
Start: 2022-02-25